# Patient Record
Sex: MALE | Race: WHITE | NOT HISPANIC OR LATINO | Employment: OTHER | URBAN - METROPOLITAN AREA
[De-identification: names, ages, dates, MRNs, and addresses within clinical notes are randomized per-mention and may not be internally consistent; named-entity substitution may affect disease eponyms.]

---

## 2018-12-07 ENCOUNTER — TRANSCRIBE ORDERS (OUTPATIENT)
Dept: ADMINISTRATIVE | Facility: HOSPITAL | Age: 73
End: 2018-12-07

## 2018-12-07 ENCOUNTER — APPOINTMENT (OUTPATIENT)
Dept: RADIOLOGY | Facility: CLINIC | Age: 73
End: 2018-12-07
Payer: MEDICARE

## 2018-12-07 DIAGNOSIS — R52 PAIN: Primary | ICD-10-CM

## 2018-12-07 PROCEDURE — 72040 X-RAY EXAM NECK SPINE 2-3 VW: CPT

## 2019-06-23 ENCOUNTER — APPOINTMENT (OUTPATIENT)
Dept: RADIOLOGY | Facility: CLINIC | Age: 74
End: 2019-06-23
Payer: MEDICARE

## 2019-06-23 ENCOUNTER — APPOINTMENT (EMERGENCY)
Dept: CT IMAGING | Facility: HOSPITAL | Age: 74
End: 2019-06-23
Payer: MEDICARE

## 2019-06-23 ENCOUNTER — APPOINTMENT (EMERGENCY)
Dept: RADIOLOGY | Facility: HOSPITAL | Age: 74
End: 2019-06-23
Payer: MEDICARE

## 2019-06-23 ENCOUNTER — HOSPITAL ENCOUNTER (EMERGENCY)
Facility: HOSPITAL | Age: 74
Discharge: HOME | End: 2019-06-23
Attending: EMERGENCY MEDICINE | Admitting: EMERGENCY MEDICINE
Payer: MEDICARE

## 2019-06-23 ENCOUNTER — OFFICE VISIT (OUTPATIENT)
Dept: URGENT CARE | Facility: CLINIC | Age: 74
End: 2019-06-23
Payer: MEDICARE

## 2019-06-23 VITALS
HEART RATE: 82 BPM | OXYGEN SATURATION: 94 % | WEIGHT: 202.16 LBS | SYSTOLIC BLOOD PRESSURE: 149 MMHG | RESPIRATION RATE: 16 BRPM | BODY MASS INDEX: 31.66 KG/M2 | DIASTOLIC BLOOD PRESSURE: 74 MMHG | TEMPERATURE: 97.7 F

## 2019-06-23 VITALS
HEIGHT: 67 IN | WEIGHT: 200 LBS | TEMPERATURE: 98 F | SYSTOLIC BLOOD PRESSURE: 133 MMHG | BODY MASS INDEX: 31.39 KG/M2 | DIASTOLIC BLOOD PRESSURE: 76 MMHG | HEART RATE: 84 BPM | RESPIRATION RATE: 16 BRPM | OXYGEN SATURATION: 97 %

## 2019-06-23 DIAGNOSIS — S42.91XA CLOSED FRACTURE DISLOCATION OF RIGHT SHOULDER, INITIAL ENCOUNTER: Primary | ICD-10-CM

## 2019-06-23 DIAGNOSIS — M21.821 HILL SACHS DEFORMITY, RIGHT: ICD-10-CM

## 2019-06-23 DIAGNOSIS — S43.491A: ICD-10-CM

## 2019-06-23 DIAGNOSIS — S43.014A ANTERIOR DISLOCATION OF RIGHT SHOULDER, INITIAL ENCOUNTER: Primary | ICD-10-CM

## 2019-06-23 DIAGNOSIS — M25.511 ACUTE PAIN OF RIGHT SHOULDER: ICD-10-CM

## 2019-06-23 PROCEDURE — 23650 CLTX SHO DSLC W/MNPJ WO ANES: CPT | Performed by: EMERGENCY MEDICINE

## 2019-06-23 PROCEDURE — 73200 CT UPPER EXTREMITY W/O DYE: CPT

## 2019-06-23 PROCEDURE — 99284 EMERGENCY DEPT VISIT MOD MDM: CPT

## 2019-06-23 PROCEDURE — 73020 X-RAY EXAM OF SHOULDER: CPT

## 2019-06-23 PROCEDURE — 99285 EMERGENCY DEPT VISIT HI MDM: CPT | Performed by: EMERGENCY MEDICINE

## 2019-06-23 PROCEDURE — 99213 OFFICE O/P EST LOW 20 MIN: CPT | Performed by: PHYSICIAN ASSISTANT

## 2019-06-23 PROCEDURE — 73030 X-RAY EXAM OF SHOULDER: CPT

## 2019-06-23 PROCEDURE — G0463 HOSPITAL OUTPT CLINIC VISIT: HCPCS | Performed by: PHYSICIAN ASSISTANT

## 2019-06-23 PROCEDURE — 96374 THER/PROPH/DIAG INJ IV PUSH: CPT

## 2019-06-23 PROCEDURE — 96375 TX/PRO/DX INJ NEW DRUG ADDON: CPT

## 2019-06-23 PROCEDURE — 96361 HYDRATE IV INFUSION ADD-ON: CPT

## 2019-06-23 RX ORDER — AMLODIPINE BESYLATE 5 MG/1
TABLET ORAL
Refills: 0 | COMMUNITY
Start: 2019-04-12 | End: 2021-05-28 | Stop reason: CLARIF

## 2019-06-23 RX ORDER — ASPIRIN 81 MG/1
81 TABLET ORAL EVERY 12 HOURS
COMMUNITY
End: 2021-02-22 | Stop reason: SDUPTHER

## 2019-06-23 RX ORDER — KETAMINE HCL IN NACL, ISO-OSM 100MG/10ML
1 SYRINGE (ML) INJECTION ONCE
Status: COMPLETED | OUTPATIENT
Start: 2019-06-23 | End: 2019-06-23

## 2019-06-23 RX ORDER — ONDANSETRON 2 MG/ML
4 INJECTION INTRAMUSCULAR; INTRAVENOUS ONCE
Status: COMPLETED | OUTPATIENT
Start: 2019-06-23 | End: 2019-06-23

## 2019-06-23 RX ORDER — HYDROMORPHONE HCL/PF 1 MG/ML
1 SYRINGE (ML) INJECTION ONCE
Status: COMPLETED | OUTPATIENT
Start: 2019-06-23 | End: 2019-06-23

## 2019-06-23 RX ORDER — CLOBETASOL PROPIONATE 0.5 MG/G
CREAM TOPICAL
Refills: 2 | COMMUNITY
Start: 2019-06-09 | End: 2021-06-21 | Stop reason: ALTCHOICE

## 2019-06-23 RX ORDER — KETAMINE HCL IN NACL, ISO-OSM 100MG/10ML
SYRINGE (ML) INJECTION
Status: COMPLETED
Start: 2019-06-23 | End: 2019-06-23

## 2019-06-23 RX ORDER — OXYCODONE HYDROCHLORIDE 5 MG/1
2.5 TABLET ORAL EVERY 6 HOURS PRN
Qty: 10 TABLET | Refills: 0 | Status: SHIPPED | OUTPATIENT
Start: 2019-06-23 | End: 2019-06-28

## 2019-06-23 RX ADMIN — Medication 20 MG: at 16:57

## 2019-06-23 RX ADMIN — Medication 50 MG: at 16:49

## 2019-06-23 RX ADMIN — SODIUM CHLORIDE 1000 ML: 0.9 INJECTION, SOLUTION INTRAVENOUS at 16:43

## 2019-06-23 RX ADMIN — ONDANSETRON 4 MG: 2 INJECTION INTRAMUSCULAR; INTRAVENOUS at 16:43

## 2019-06-23 RX ADMIN — HYDROMORPHONE HYDROCHLORIDE 1 MG: 1 INJECTION, SOLUTION INTRAMUSCULAR; INTRAVENOUS; SUBCUTANEOUS at 15:51

## 2019-06-23 NOTE — SEDATION DOCUMENTATION
Results of ct scan discussed with patient and wife by dr Faheem Florence   Pt given water to sip on by Dr Sheridan Bermudez

## 2019-06-23 NOTE — ED PROVIDER NOTES
History  Chief Complaint   Patient presents with    Shoulder Injury     dislocation of right shoulder, seen at urgent care in [place] , sustained some superficial abrasions of forearm, was mowing grass and lost control struck a tree  Arthur Cifuentes \[facility] [place] [facility]\""     15-year-old male with history of thyroglossal duct cyst, asthma, hypertension, who presents for evaluation of right anterior shoulder dislocation  Patient was using a push mower to mow his lawn when he accidentally ran into a tree with his right shoulder  He did not fall to the ground, hit his head, or lose consciousness  Patient's neighbor is a PA, and attempted to reduce the patient's shoulder at home, but was unsuccessful  The patient went to an urgent care where x-rays were obtained that showed a right anterior shoulder dislocation  He denies numbness or tingling in his hand  No prior history of injury to the shoulder  Endorses pain just in the right shoulder  No headache, neck pain, back pain  Prior to Admission Medications   Prescriptions Last Dose Informant Patient Reported? Taking? amLODIPine (NORVASC) 5 mg tablet   Yes Yes   aspirin (ECOTRIN LOW STRENGTH) 81 mg EC tablet  Self Yes Yes   Sig: Take 81 mg by mouth every 12 (twelve) hours   clobetasol (TEMOVATE) 0 05 % cream   Yes Yes   Sig: APPLY UP TO TWICE DAILY TO HANDS FOR 2 WEEKS ON THEN 1 WEEK OFF AS NEEDED  Facility-Administered Medications: None       Past Medical History:   Diagnosis Date    Asthma     Hypertension        Past Surgical History:   Procedure Laterality Date    CYST REMOVAL      TONSILLECTOMY         History reviewed  No pertinent family history  I have reviewed and agree with the history as documented  Social History     Tobacco Use    Smoking status: Never Smoker    Smokeless tobacco: Never Used   Substance Use Topics    Alcohol use: Yes     Comment: social    Drug use: Not on file        Review of Systems   Constitutional: Negative for chills and fever  HENT: Negative for congestion  Eyes: Negative for visual disturbance  Respiratory: Negative for cough and shortness of breath  Cardiovascular: Negative for chest pain and leg swelling  Gastrointestinal: Negative for abdominal pain, diarrhea, nausea and vomiting  Genitourinary: Negative for dysuria and frequency  Musculoskeletal: Positive for arthralgias (R shoulder pain)  Negative for back pain, joint swelling, neck pain and neck stiffness  Skin: Negative for rash  Neurological: Negative for weakness, numbness and headaches  Psychiatric/Behavioral: Negative for agitation, behavioral problems and confusion  Physical Exam  Physical Exam   Constitutional: He is oriented to person, place, and time  He appears well-developed and well-nourished  No distress  HENT:   Head: Normocephalic and atraumatic  Right Ear: External ear normal    Left Ear: External ear normal    Nose: Nose normal    Mouth/Throat: Oropharynx is clear and moist    Eyes: Conjunctivae are normal    Neck: Normal range of motion  Neck supple  Cardiovascular: Normal rate, regular rhythm, normal heart sounds and intact distal pulses  Exam reveals no gallop and no friction rub  No murmur heard  Pulmonary/Chest: Effort normal and breath sounds normal  No respiratory distress  He has no wheezes  He has no rales  Abdominal: Soft  Bowel sounds are normal  He exhibits no distension  There is no tenderness  There is no guarding  Musculoskeletal: Normal range of motion  He exhibits no edema or deformity  Obvious anterior shoulder dislocation  Palpable displaced bone fragment anterior to the right humeral head with associated tenderness to palpation  Neurological: He is alert and oriented to person, place, and time  He exhibits normal muscle tone  Flexion and extension is fully intact at the DIPs, PIPs, MCPs, and IP joints of the R hand  Flexion, extension, and lateral movements of the R wrist are intact   Opposition of the R thumb is intact  Pt is able to resist adduction and abduction of the fingers of the R hand  Intact sensation to light touch in the peripheral nerve distributions of the right hand  Intact sensation to light touch in the distribution of the right axillary nerve  Skin: Skin is warm and dry  He is not diaphoretic  Vital Signs  ED Triage Vitals   Temperature Pulse Respirations Blood Pressure SpO2   06/23/19 1509 06/23/19 1509 06/23/19 1509 06/23/19 1509 06/23/19 1509   97 7 °F (36 5 °C) 80 20 142/79 92 %      Temp Source Heart Rate Source Patient Position - Orthostatic VS BP Location FiO2 (%)   06/23/19 1509 06/23/19 2030 06/23/19 2030 06/23/19 1509 --   Oral Monitor Standing Left arm       Pain Score       06/23/19 1509       3           Vitals:    06/23/19 1853 06/23/19 1857 06/23/19 1905 06/23/19 2030   BP:  162/77  149/74   Pulse: 60 60 64 82   Patient Position - Orthostatic VS:    Standing         Visual Acuity      ED Medications  Medications   HYDROmorphone (DILAUDID) injection 1 mg (1 mg Intravenous Given 6/23/19 1551)   Ketamine HCl 92 mg (50 mg Intravenous Given 6/23/19 1649)   ondansetron (ZOFRAN) injection 4 mg (4 mg Intravenous Given 6/23/19 1643)   sodium chloride 0 9 % bolus 1,000 mL (0 mL Intravenous Stopped 6/23/19 2038)   Ketamine HCl 50 mg/5 mL **ADS Override Pull** (20 mg  Given 6/23/19 1657)       Diagnostic Studies  Results Reviewed     None                 CT shoulder right wo contrast   Final Result by La Nena Rodriguez MD (06/23 1817)      Hill-Sachs and bony Bankart lesions  Tiny loose body in the superior joint space  Small joint effusion  Workstation performed: [id number]         XR shoulder 1 vw right   Final Result by Milton Dahl MD (06/23 2114)      Single image suggests adequate reduction of the dislocation of the shoulder seen earlier  Hill-Sachs deformity noted        Probable joint effusion            Workstation performed: [id number]         XR shoulder 1 vw right   Final Result by Rozetta Nissen, MD (06/23 2058)      Persistent anteroinferior shoulder dislocation with Hill-Sachs fracture            Workstation performed: [id number]         XR shoulder 1 vw right   Final Result by Rozetta Nissen, MD (06/23 2057)      Persistent anteroinferiorly dislocated shoulder  Hill-Sachs fracture fragment less apparent due to projection            Workstation performed: [id number]                    Procedures  Orthopedic Injury  Date/Time: 6/23/2019 4:00 PM  Performed by: Jodee Markham MD  Authorized by: Jodee Markham MD     Patient Location:  ED  Other Assisting Provider: Yes (comment) (Dr Nelli Workman)    Verbal consent obtained?: Yes    Risks and benefits: Risks, benefits and alternatives were discussed    Consent given by:  Patient  Patient states understanding of procedure being performed: Yes    Patient identity confirmed:  Verbally with patient  Time out: Immediately prior to the procedure a time out was called    Injury location:  Shoulder  Location details:  Right shoulder  Injury type:  Fracture-dislocation  Dislocation type: anterior    Fracture type: greater humeral tuberosity    Neurovascular status: Neurovascularly intact    Distal perfusion: normal    Neurological function: normal    Range of motion: reduced    Local anesthesia used?: No    Sedation type:   Moderate (conscious) sedation (See separate Procedural Sedation form)  Manipulation performed?: Yes    Skin traction used?: No    Skeletal traction used?: Yes    Reduction successful?: Yes    Confirmation: Reduction confirmed by x-ray    Immobilization:  Sling  Neurovascular status: Neurovascularly intact    Distal perfusion: normal    Neurological function: normal    Range of motion: normal    Patient tolerance:  Patient tolerated the procedure well with no immediate complications  Procedural Sedation  Date/Time: 6/24/2019 8:12 AM  Performed by: Jodee Markham MD  Authorized by: Andreina Whyte MD     Consent:     Consent obtained:  Written    Consent given by:  Patient    Risks discussed: Allergic reaction, inadequate sedation, nausea, respiratory compromise necessitating ventilatory assistance and intubation and vomiting    Alternatives discussed:  Analgesia without sedation  Universal protocol:     Procedure explained and questions answered to patient or proxy's satisfaction: yes      Relevant documents present and verified: yes      Radiology Images displayed and confirmed    If images not available, report reviewed: yes      Required blood products, implants, devices, and special equipment available: yes      Immediately prior to procedure a time out was called: yes      Patient identity confirmation method:  Verbally with patient  Indications:     Sedation purpose:  Dislocation reduction    Procedure necessitating sedation performed by:  Physician performing sedation    Intended level of sedation:  Moderate (conscious sedation)  Pre-sedation assessment:     ASA classification: class 1 - normal, healthy patient      Neck mobility: normal      Mouth opening:  3 or more finger widths    Thyromental distance:  2 finger widths    Mallampati score:  II - soft palate, uvula, fauces visible    Pre-sedation assessments completed and reviewed: airway patency, cardiovascular function, hydration status, mental status, nausea/vomiting and pain level    Immediate pre-procedure details:     Reassessment: Patient reassessed immediately prior to procedure      Reviewed: vital signs      Verified: bag valve mask available, emergency equipment available, intubation equipment available, IV patency confirmed and oxygen available    Procedure details (see MAR for exact dosages):     Preoxygenation:  Nasal cannula    Sedation:  Ketamine    Intra-procedure monitoring:  Blood pressure monitoring, cardiac monitor, continuous capnometry and continuous pulse oximetry    Intra-procedure events: none Intra-procedure management:  Airway repositioning and fluid bolus  Post-procedure details:     Attendance: Constant attendance by certified staff until patient recovered      Recovery: Patient returned to pre-procedure baseline      Post-sedation assessments completed and reviewed: airway patency, cardiovascular function, hydration status, mental status, nausea/vomiting and pain level      Patient is stable for discharge or admission: yes      Patient tolerance: Tolerated well, no immediate complications      Conscious Sedation Assessment      Classification Score   ASA Scale Assessment  1-Healthy patient, no disease outside surgical process filed at 06/23/2019 1642           ED Course                               MDM  Number of Diagnoses or Management Options  Diagnosis management comments: Generally well appearing  Afebrile and hemodynamically stable  Patient has an obvious right anterior shoulder dislocation, with a displaced fracture off an unknown location to the humeral head  He is neurovascularly intact as above to the right upper extremity  Given accompanying displaced fracture, case was discussed with Dr Venecia Parker with Orthopedic surgery  After reviewing the patient's x-ray, recommends shoulder reduction followed by CT scan to get better characterization of the location of the displaced bone fragment  If the displaced bone fragment is intra-articular following the reduction, patient will require admission for urgent surgery tomorrow  If the bone fragment is extra-articular following the reduction, patient can be discharged home in a sling with Orthopedic surgery follow-up  Numerous maneuvers were attempted without procedural sedation status post a dose of IV Dilaudid to reduce the patient's shoulder, unfortunately were unsuccessful  Ketamine procedural sedation given with successful reduction of the patient's shoulder      CT scan reveals Hill-Sachs and bony Bankart lesions with tiny loose body in the superior joint space and a small accompanying joint effusion  Per Orthopedic surgery, patient is stable for discharge home with follow-up with Dr Kosta Dumont this week for humeral head fracture with possible accompanying rotator cuff injury  Will prescribe a small amount of oxycodone at discharge  Return precautions discussed  Disposition  Final diagnoses:   Anterior dislocation of right shoulder, initial encounter   Hill Sachs deformity, right   Bankart variant lesion of right shoulder, initial encounter     Time reflects when diagnosis was documented in both MDM as applicable and the Disposition within this note     Time User Action Codes Description Comment    6/23/2019  6:51 PM Thais Bagley [[id number]] Anterior dislocation of right shoulder, initial encounter     6/23/2019  6:51 PM Thais Bagley Elease Valdemar deformity, right     6/23/2019  6:52 PM Thais Bagley [[id number]] Bankart variant lesion of right shoulder, initial encounter       ED Disposition     ED Disposition Condition Date/Time Comment    Discharge Stable [place] Jun 23, 2019  6:51 PM Aggie Jacobs  discharge to home/self care  Follow-up Information     Follow up With Specialties Details Why Contact Info Additional Information    Shellie Paul MD Orthopedic Surgery  Please call to set up an appointment within 1 week for further management of your shoulder fracture  [address] [facility]  [address] [facility] [address] [facility] Emergency Department Emergency Medicine  Return to the Emergency Department for numbness or tingling in your right hand, weakness in your right hand, or new or concerning symptoms   [address] [facility] [address] [phone] [facility] ED,  [address], [place], [place], [address]          Discharge Medication List as of 6/23/2019  6:55 PM      START taking these medications    Details   oxyCODONE (ROXICODONE) 5 mg immediate release tablet Take 0 5 tablets (2 5 mg total) by mouth every 6 (six) hours as needed for moderate pain (shoulder pain) for up to 5 daysMax Daily Amount: 10 mg, Starting Sun 6/23/2019, Until Fri 6/28/2019, Print         CONTINUE these medications which have NOT CHANGED    Details   amLODIPine (NORVASC) 5 mg tablet Starting Fri 4/12/2019, Historical Med      aspirin (ECOTRIN LOW STRENGTH) 81 mg EC tablet Take 81 mg by mouth every 12 (twelve) hours, Historical Med      clobetasol (TEMOVATE) 0 05 % cream APPLY UP TO TWICE DAILY TO HANDS FOR 2 WEEKS ON THEN 1 WEEK OFF AS NEEDED , Historical Med           No discharge procedures on file      ED Provider  Electronically Signed by           Rod Wagner MD  06/23/19 0568       Rod Wagner MD  06/24/19 7802

## 2019-06-24 NOTE — ED NOTES
Pt ambulated around department, steady gait noted  Pt states that he feels good and is requesting to go home, Dr Yesenia De Leon made aware       Almaz Garcia, TAMIA  06/23/19 2039

## 2019-06-26 VITALS
SYSTOLIC BLOOD PRESSURE: 134 MMHG | WEIGHT: 198 LBS | HEIGHT: 67 IN | HEART RATE: 86 BPM | BODY MASS INDEX: 31.08 KG/M2 | DIASTOLIC BLOOD PRESSURE: 86 MMHG

## 2019-06-26 DIAGNOSIS — S42.141A CLOSED FRACTURE OF RIM OF GLENOID FOSSA OF RIGHT SCAPULA, INITIAL ENCOUNTER: ICD-10-CM

## 2019-06-26 DIAGNOSIS — S42.254A CLOSED NONDISPLACED FRACTURE OF GREATER TUBEROSITY OF RIGHT HUMERUS, INITIAL ENCOUNTER: Primary | ICD-10-CM

## 2019-06-26 PROCEDURE — 23620 CLTX GR HMRL TBRS FX WO MNPJ: CPT | Performed by: ORTHOPAEDIC SURGERY

## 2019-06-26 PROCEDURE — 99203 OFFICE O/P NEW LOW 30 MIN: CPT | Performed by: ORTHOPAEDIC SURGERY

## 2019-07-10 ENCOUNTER — APPOINTMENT (OUTPATIENT)
Dept: RADIOLOGY | Facility: AMBULARY SURGERY CENTER | Age: 74
End: 2019-07-10
Attending: ORTHOPAEDIC SURGERY
Payer: MEDICARE

## 2019-07-10 VITALS
DIASTOLIC BLOOD PRESSURE: 80 MMHG | HEIGHT: 67 IN | WEIGHT: 198 LBS | BODY MASS INDEX: 31.08 KG/M2 | SYSTOLIC BLOOD PRESSURE: 141 MMHG | HEART RATE: 92 BPM

## 2019-07-10 DIAGNOSIS — M25.511 ACUTE PAIN OF RIGHT SHOULDER: ICD-10-CM

## 2019-07-10 DIAGNOSIS — S42.254D CLOSED NONDISPLACED FRACTURE OF GREATER TUBEROSITY OF RIGHT HUMERUS WITH ROUTINE HEALING, SUBSEQUENT ENCOUNTER: Primary | ICD-10-CM

## 2019-07-10 DIAGNOSIS — S42.141D CLOSED FRACTURE OF RIM OF GLENOID FOSSA OF RIGHT SCAPULA WITH ROUTINE HEALING, SUBSEQUENT ENCOUNTER: ICD-10-CM

## 2019-07-10 PROCEDURE — 99024 POSTOP FOLLOW-UP VISIT: CPT | Performed by: ORTHOPAEDIC SURGERY

## 2019-07-10 PROCEDURE — 73030 X-RAY EXAM OF SHOULDER: CPT

## 2019-07-10 NOTE — PROGRESS NOTES
Patient Name:  Manas Omer  MRN:  [id number]    Assessment & Plan     Right greater tuberosity fracture and small anteroinferior glenoid rim fracture (bony Bankart) secondary to right shoulder dislocation sustained on 06/23/2019    1  Patient may begin physical therapy for passive range of motion  2  Patient not lift weight greater than 1 lb, and he may perform activities tolerated  3  Follow-up 4 weeks for new x-rays      Subjective     Patient is a 67-year-old male who presents the office today for a follow-up in regard to his right shoulder fracture sustained on 06/23/2019  Patient has discontinued use of sling at this time  He has continued to work on his range of motion at home  Today he denies any significant pain  He has been able to manage his pain with over-the-counter medication  He denies any numbness or tingling  He denies any new injury  General ROS:  Negative for fever or chills  Neurological ROS:  Negative for numbness or tingling  Objective     /80   Pulse 92   Ht 5' 7" (1 702 m)   Wt 89 8 kg (198 lb)   BMI 31 01 kg/m²     Right shoulder:  Skin intact  Ecchymosis noted  Full range of motion of the elbow appreciated  Full composite fist noted  Range of motion as expected  strength a shoulder deferred  Sensation intact  Radial pulse 2 +    Data Review     I have personally reviewed pertinent films in PACS, and my interpretation follows        X-rays of right shoulder obtained on 07/10/2019 demonstrate stable alignment of fracture to the glenoid rim and greater tuberosity    Social History     Tobacco Use    Smoking status: Never Smoker    Smokeless tobacco: Never Used   Substance Use Topics    Alcohol use: Yes     Comment: social    Drug use: Not on file       Scribe Attestation    I,:   Nj Sanchez MA am acting as a scribe while in the presence of the attending physician :        I,:   Pablito Cervantes MD personally performed the services described in this documentation    as scribed in my presence :

## 2019-07-16 ENCOUNTER — EVALUATION (OUTPATIENT)
Dept: PHYSICAL THERAPY | Facility: CLINIC | Age: 74
End: 2019-07-16
Payer: MEDICARE

## 2019-07-16 DIAGNOSIS — S42.254A CLOSED NONDISPLACED FRACTURE OF GREATER TUBEROSITY OF RIGHT HUMERUS, INITIAL ENCOUNTER: ICD-10-CM

## 2019-07-16 DIAGNOSIS — S42.141A CLOSED FRACTURE OF RIM OF GLENOID FOSSA OF RIGHT SCAPULA, INITIAL ENCOUNTER: ICD-10-CM

## 2019-07-16 PROCEDURE — 97162 PT EVAL MOD COMPLEX 30 MIN: CPT | Performed by: PHYSICAL THERAPIST

## 2019-07-16 PROCEDURE — 97110 THERAPEUTIC EXERCISES: CPT | Performed by: PHYSICAL THERAPIST

## 2019-07-16 PROCEDURE — 97140 MANUAL THERAPY 1/> REGIONS: CPT | Performed by: PHYSICAL THERAPIST

## 2019-07-16 NOTE — PROGRESS NOTES
PT Evaluation     Today's date: 2019  Patient name: Derek Medrano  : 1945  MRN: [id number]  Referring provider: Gallito Monet MD  Dx:   Encounter Diagnosis     ICD-10-CM    1  Closed nondisplaced fracture of greater tuberosity of right humerus, initial encounter S42 254A Ambulatory referral to Physical Therapy   2  Closed fracture of rim of glenoid fossa of right scapula, initial encounter S42 141A Ambulatory referral to Physical Therapy                  Assessment  Assessment details: Derek Medrano  is a pleasant 66 y o  presenting to physical therapy with MD referral for Closed nondisplaced fracture of greater tuberosity of right humerus, initial encounter, and Closed fracture of rim of glenoid fossa of right scapula, initial encounter  Problem list: Poor posture, decreased shoulder PROM, AROM not assessed and strength not assessed due to script stating PROM only  Treatment to include: Manual therapy techniques, shoulder PROM (A/AAROM when permitted by MD), RTC/periscapular strengthening when permitted by MD, instruction in a comprehensive HEP, and modalities as needed  This pt would benefit from skilled PT services to address their impairments and functional limitations to maximize functional outcome  Symptom irritability: lowBarriers to therapy: age  Understanding of Dx/Px/POC: good   Prognosis: good    Goals  ST  Pt will improve shoulder flexion PROM to at least 155 degrees in 4 weeks  2  Pt will improve shoulder scaption PROM to at least 155 degrees in 4 weeks  LT  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 with minimal to no pain in  8 weeks  2  Pt will be independent in a comprehensive HEP in 8 weeks      Plan  Patient would benefit from: skilled physical therapy  Frequency: 2x week  Duration in weeks: 8  Treatment plan discussed with: patient        Subjective Evaluation    History of Present Illness  Date of onset: 2019  Mechanism of injury: trauma  Mechanism of injury: Pt reports he was using a push mower over his septic mound and the mower caused him to loose his balance and fall into a tree backwards  Pt states he was unable to move his right arm and was in extreme pain  Pt states his neighbor helped pt get stable and his wife drove him to Urgent Care where x-rays were taken revealing anterior shoulder dislocation  Pt was taken to the hospital via his wife where he was taken back into a room where they reset his shoulder  Pt states he followed-up with ortho who educated pt to start PROM only  Pt denies any numbness or tingling in his arm or any cold sensation in arm  Premorbid status:  - ADLs: Independent with no difficulty  - Work: Not a working individual- retired  - Recreation: walking for exercise    Current status:  - ADLs/Functional activities:     - Reaching into shoulder height cabinets with Pain Levels: unable   - Reaching into overhead cabinets with Pain Levels: unable   - Washing lower back/tucking in shirt with Pain Levels: unable   - Washing hair with Pain Levels: unable   - Driving with both arms with no pain   - Lifting unable with RUE   - Carrying unable with [address] with 0 nightly sleep disturbances due to pain  - Work: Not a working individual- retired  - Recreation: none  Pain  Current pain ratin  At best pain ratin  At worst pain ratin  Location: Lateral aspect of shoulder  Quality: discomfort  Relieving factors: medications and ice  Aggravating factors: overhead activity  Progression: improved      Diagnostic Tests  X-ray: abnormal  Treatments  Treatments tried: reset shoulder in ER    Current treatment: medication and physical therapy  Patient Goals  Patient goals for therapy: increased motion, decreased pain, increased strength and independence with ADLs/IADLs          Objective     Postural Observations  Seated posture: fair  Standing posture: fair    Additional Postural Observation Details  Moderate forward head and forward shoulder posture    Observations     Additional Observation Details  Yellow and purple bruising noted over proximal forearm and around upper arm on RUE    Palpation     Additional Palpation Details  Increased TTP over corocoid process on RUE     Active Range of Motion   Left Shoulder   Flexion: 155 degrees   Abduction: 145 degrees   External rotation BTH: T5   Internal rotation BTB: T7     Additional Active Range of Motion Details  AROM not assessed on RUE due to script stating PROM only      Passive Range of Motion     Right Shoulder   Flexion: 140 degrees with pain  Abduction: 140 degrees with pain  External rotation 45°: 55 degrees with pain  Internal rotation 45°: 43 degrees     Strength/Myotome Testing     Left Shoulder     Planes of Motion   Flexion: 5   Abduction: 5   External rotation at 0°: 5     Additional Strength Details  Strength not tested on RUE due to PROM only      Flowsheet Rows      Most Recent Value   PT/OT G-Codes   Current Score  51   Projected Score  67             Precautions: PROM only per MD script, No combined ext and IR      Manual  7-16 (IE)            PROM with OP into shoulder flex, scap, IR/ER at 45 degrees abd JG                                                                    Exercise Diary  7-16 (IE)            Pendulums CW/CCW/AP/ML 20 x ea NV                         Seated:             - PROM flexion 10 x 10" NV            - PROM scaption 10 x 10" NV            - scapular retraction 10 x 10" NV            - cervical retraction 10 x 10" NV            - elbow flexion 2 x 10 NV            - wrist flex/ext 2 x 10 NV            - pron/sup 2 x 10 NV                                                                Standing:             - ER stretch in doorway 4 x 30" NV                                                                                              Modalities  7-16 (IE)                                                     * On initial evaluation, educated pt on anatomy, pathology, and exercise rationale  Provided pt with basic HEP and ensured proper exercise performance  Educated pt to call with any questions or concerns  Educated pt to avoid combined extension and IR due to bony bankart lesion  Access Code: [email or web address]   URL: [email or web address]   Date: 07/16/2019   Prepared by: Orvis Dye      Exercises  · Circular Shoulder Pendulum with Table Support - 20 reps - 3x daily  · Seated Shoulder Scaption Slide at Table Top with Forearm in Neutral - 10 reps - 5 seconds hold - 3x daily  · Flexion-Extension Shoulder Pendulum with Table Support - 20 reps - 3x daily  · Seated Shoulder Flexion Towel Slide at Table Top - 10 reps - 5 seconds hold - 3x daily  · Seated Scapular Retraction - 10 reps - 10 seconds hold - 3x daily  · Seated Cervical Retraction - 10 reps - 10 seconds hold - 3x daily

## 2019-07-18 ENCOUNTER — OFFICE VISIT (OUTPATIENT)
Dept: PHYSICAL THERAPY | Facility: CLINIC | Age: 74
End: 2019-07-18
Payer: MEDICARE

## 2019-07-18 DIAGNOSIS — S42.254A CLOSED NONDISPLACED FRACTURE OF GREATER TUBEROSITY OF RIGHT HUMERUS, INITIAL ENCOUNTER: Primary | ICD-10-CM

## 2019-07-18 DIAGNOSIS — S42.141A CLOSED FRACTURE OF RIM OF GLENOID FOSSA OF RIGHT SCAPULA, INITIAL ENCOUNTER: ICD-10-CM

## 2019-07-18 PROCEDURE — 97140 MANUAL THERAPY 1/> REGIONS: CPT

## 2019-07-18 PROCEDURE — 97110 THERAPEUTIC EXERCISES: CPT

## 2019-07-18 PROCEDURE — 97112 NEUROMUSCULAR REEDUCATION: CPT

## 2019-07-18 NOTE — PROGRESS NOTES
Daily Note     Today's date: 2019  Patient name: July Artis  : 1945  MRN: [id number]  Referring provider: Rashad Preciado MD  Dx:   Encounter Diagnosis     ICD-10-CM    1  Closed nondisplaced fracture of greater tuberosity of right humerus, initial encounter S42 254A    2  Closed fracture of rim of glenoid fossa of right scapula, initial encounter S42 141A        Start Time: 1515  Stop Time: 1555  Total time in clinic (min): 40 minutes    Subjective: Pt reports R shldr feeling sore prior to start of session  Thinks he it may be from some of the stretches performed from his HEP that he may have overdone  States pain this morning was a 2/10 but is now a 1/10 before starting treatment today  Objective: See treatment diary below      Assessment: Tolerated treatment well  Program initiated today as prescribed by evaluating PT  Was able to perform all exercise with no c/o pain  Slight pain noted at end range during PROM in abd, flex, and ER  Required occasional verbal/tactile cueing for proper form and intensity with new exercises performed today  Patient would benefit from continued PT to further improve ROM with decreased pain in effort to return to PLOF  Plan: Continue per plan of care        Precautions: PROM only per MD script, No combined ext and IR      Manual  - (IE)            PROM with OP into shoulder flex, scap, IR/ER at 45 degrees abd [employer]                                                                   Exercise Diary  - (IE)            Pendulums CW/CCW/AP/ML 20 x ea NV 20 x ea                        Seated:             - PROM flexion 10 x 10" NV 10 x 10"           - PROM scaption 10 x 10" NV 10 x 10"           - scapular retraction 10 x 10" NV 10 x 10"           - cervical retraction 10 x 10" NV 10 x 10"           - elbow flexion 2 x 10 NV 2x10           - wrist flex/ext 2 x 10 NV 2x10           - pron/sup 2 x 10 NV 2x10 Standing:             - ER stretch in doorway 4 x 30" NV 4 x 30"                                                                                             Modalities  7-16 (IE) 7/18

## 2019-07-23 ENCOUNTER — OFFICE VISIT (OUTPATIENT)
Dept: PHYSICAL THERAPY | Facility: CLINIC | Age: 74
End: 2019-07-23
Payer: MEDICARE

## 2019-07-23 DIAGNOSIS — S42.141A CLOSED FRACTURE OF RIM OF GLENOID FOSSA OF RIGHT SCAPULA, INITIAL ENCOUNTER: ICD-10-CM

## 2019-07-23 DIAGNOSIS — S42.254A CLOSED NONDISPLACED FRACTURE OF GREATER TUBEROSITY OF RIGHT HUMERUS, INITIAL ENCOUNTER: Primary | ICD-10-CM

## 2019-07-23 PROCEDURE — 97110 THERAPEUTIC EXERCISES: CPT

## 2019-07-23 PROCEDURE — 97140 MANUAL THERAPY 1/> REGIONS: CPT

## 2019-07-23 NOTE — PROGRESS NOTES
Daily Note     Today's date: 2019  Patient name: Shelbi Carmona  : 1945  MRN: [id number]  Referring provider: Leopold Mura, MD  Dx:   Encounter Diagnosis     ICD-10-CM    1  Closed nondisplaced fracture of greater tuberosity of right humerus, initial encounter S42 254A    2  Closed fracture of rim of glenoid fossa of right scapula, initial encounter S42 141A        Start Time: 1441  Stop Time: 1519  Total time in clinic (min): 38 minutes    Subjective: Pt reported his R shoulder/ humerus pain makes it hard for him to sleep -2/10  Pt declines icing RUE at home  Pt reported he goes back to see his MD on Aug 7th  Objective: See treatment diary below      Assessment: Continued with treatment plan no increase in pain with exercises performed  Tolerated treatment well  Patient demonstrated fatigue post treatment, exhibited good technique with therapeutic exercises and would benefit from continued PT  Pt reported he will ice shoulder at home if the pain is increased  Plan: Continue per plan of care  Precautions: PROM only per MD script, No combined ext and IR      Manual   (IE)           PROM with OP into shoulder flex, scap, IR/ER at 45 degrees abd [employer] SC                                                                  Exercise Diary   (IE)           Pendulums CW/CCW/AP/ML 20 x ea NV 20 x ea 20x ea                          Seated:             - PROM flexion 10 x 10" NV 10 x 10"           - PROM scaption 10 x 10" NV 10 x 10"           - scapular retraction 10 x 10" NV 10 x 10" 10" x 10           - cervical retraction 10 x 10" NV 10 x 10" 10x 10"           - elbow flexion 2 x 10 NV 2x10 2x10           - wrist flex/ext 2 x 10 NV 2x10 2x 10           - pron/sup 2 x 10 NV 2x10 2x10                                                               Standing:             - ER stretch in doorway 4 x 30" NV 4 x 30" Modalities  7-16 (IE) 7/18

## 2019-07-25 ENCOUNTER — OFFICE VISIT (OUTPATIENT)
Dept: PHYSICAL THERAPY | Facility: CLINIC | Age: 74
End: 2019-07-25
Payer: MEDICARE

## 2019-07-25 DIAGNOSIS — S42.141A CLOSED FRACTURE OF RIM OF GLENOID FOSSA OF RIGHT SCAPULA, INITIAL ENCOUNTER: ICD-10-CM

## 2019-07-25 DIAGNOSIS — S42.254A CLOSED NONDISPLACED FRACTURE OF GREATER TUBEROSITY OF RIGHT HUMERUS, INITIAL ENCOUNTER: Primary | ICD-10-CM

## 2019-07-25 PROCEDURE — 97110 THERAPEUTIC EXERCISES: CPT

## 2019-07-25 PROCEDURE — 97140 MANUAL THERAPY 1/> REGIONS: CPT

## 2019-07-25 NOTE — PROGRESS NOTES
Daily Note     Today's date: 2019  Patient name: Angel Pennington  : 1945  MRN: [id number]  Referring provider: Brian Santacruz MD  Dx:   Encounter Diagnosis     ICD-10-CM    1  Closed nondisplaced fracture of greater tuberosity of right humerus, initial encounter S42 254A    2  Closed fracture of rim of glenoid fossa of right scapula, initial encounter S42 141A        Start Time: 1400  Stop Time: 1439  Total time in clinic (min): 39 minutes    Subjective: pt reported no change in pain, 2/10 pain all the time consistently  Pt described it as muscle pain  Objective: See treatment diary below      Assessment:  Added some AAROM exercises, table slides and stretches, Tolerated treatment well  Patient demonstrated fatigue post treatment, exhibited good technique with therapeutic exercises and would benefit from continued PT, Pt denied increase in pain with other exercises  Plan: Continue per plan of care  Precautions: PROM only per MD script, No combined ext and IR      Manual   (IE)          PROM with OP into shoulder flex, scap, IR/ER at 45 degrees abd [employer] SC                                                                  Exercise Diary   (IE)          Pendulums CW/CCW/AP/ML 20 x ea NV 20 x ea 20x ea  20x ea                         Seated:             - PROM flexion 10 x 10" NV 10 x 10"  np         - PROM scaption 10 x 10" NV 10 x 10"  np         - scapular retraction 10 x 10" NV 10 x 10" 10" x 10  10" x 10          - cervical retraction 10 x 10" NV 10 x 10" 10x 10"  10x 10"          - elbow flexion 2 x 10 NV 2x10 2x10  2x10          - wrist flex/ext 2 x 10 NV 2x10 2x 10  3x10          - pron/sup 2 x 10 NV 2x10 2x10  2x10                       Table slides flexion    10" x 10          Table slides scaption    10" x 10          Seated ER stretch    30" x 4                                                 Standing:             - ER stretch in doorway 4 x 30" NV 4 x 30"                                                                                             Modalities  7-16 (IE) 7/18 7/25

## 2019-07-30 ENCOUNTER — OFFICE VISIT (OUTPATIENT)
Dept: PHYSICAL THERAPY | Facility: CLINIC | Age: 74
End: 2019-07-30
Payer: MEDICARE

## 2019-07-30 DIAGNOSIS — S42.254A CLOSED NONDISPLACED FRACTURE OF GREATER TUBEROSITY OF RIGHT HUMERUS, INITIAL ENCOUNTER: Primary | ICD-10-CM

## 2019-07-30 DIAGNOSIS — S42.141A CLOSED FRACTURE OF RIM OF GLENOID FOSSA OF RIGHT SCAPULA, INITIAL ENCOUNTER: ICD-10-CM

## 2019-07-30 PROCEDURE — 97110 THERAPEUTIC EXERCISES: CPT

## 2019-07-30 PROCEDURE — 97140 MANUAL THERAPY 1/> REGIONS: CPT

## 2019-07-30 NOTE — PROGRESS NOTES
Daily Note     Today's date: 2019  Patient name: Hillary Lemons  : 1945  MRN: [id number]  Referring provider: Malu Rogers MD  Dx:   Encounter Diagnosis     ICD-10-CM    1  Closed nondisplaced fracture of greater tuberosity of right humerus, initial encounter S42 254A    2  Closed fracture of rim of glenoid fossa of right scapula, initial encounter S42 141A        Start Time: 1205  Stop Time: 1247  Total time in clinic (min): 42 minutes    Subjective: Pt reported pain is getting better 1/10 prior to treatment session, Pt reported sleeping is getting better as well  Objective: See treatment diary below      Assessment:   Continued with treatment plan, progressed number of reps today, no additional pain t/o treatment session, Tolerated treatment well  Patient demonstrated fatigue post treatment, exhibited good technique with therapeutic exercises and would benefit from continued PT, pt progressing well, progress via patient MD script  Plan: Continue per plan of care  Precautions: PROM only per MD script, No combined ext and IR      Manual   (IE)         PROM with OP into shoulder flex, scap, IR/ER at 45 degrees abd [employer] SC SC SC                                                                Exercise Diary   (IE)         Pendulums CW/CCW/AP/ML 20 x ea NV 20 x ea 20x ea  20x ea    30x                      Seated:             - PROM flexion 10 x 10" NV 10 x 10"  np         - PROM scaption 10 x 10" NV 10 x 10"  np         - scapular retraction 10 x 10" NV 10 x 10" 10" x 10  10" x 10  10" x 10         - cervical retraction 10 x 10" NV 10 x 10" 10x 10"  10x 10"  10" x 10         - elbow flexion 2 x 10 NV 2x10 2x10  2x10  3x10         - wrist flex/ext 2 x 10 NV 2x10 2x 10  3x10  3x10         - pron/sup 2 x 10 NV 2x10 2x10  2x10  3x10                      Table slides flexion    10" x 10  10" x 10         Table slides scaption    10" x 10 10" x 10         Seated ER stretch    30" x 4  30" x 4                                                Standing:             - ER stretch in doorway 4 x 30" NV 4 x 30"   Home                                                                                           Modalities  7-16 (IE) 7/18 7/25 7/30

## 2019-08-01 ENCOUNTER — OFFICE VISIT (OUTPATIENT)
Dept: PHYSICAL THERAPY | Facility: CLINIC | Age: 74
End: 2019-08-01
Payer: MEDICARE

## 2019-08-01 DIAGNOSIS — S42.141A CLOSED FRACTURE OF RIM OF GLENOID FOSSA OF RIGHT SCAPULA, INITIAL ENCOUNTER: ICD-10-CM

## 2019-08-01 DIAGNOSIS — S42.254A CLOSED NONDISPLACED FRACTURE OF GREATER TUBEROSITY OF RIGHT HUMERUS, INITIAL ENCOUNTER: Primary | ICD-10-CM

## 2019-08-01 PROCEDURE — 97110 THERAPEUTIC EXERCISES: CPT

## 2019-08-01 PROCEDURE — 97140 MANUAL THERAPY 1/> REGIONS: CPT

## 2019-08-01 NOTE — PROGRESS NOTES
Daily Note     Today's date: 2019  Patient name: James Mari  : 1945  MRN: [id number]  Referring provider: Neto Batista MD  Dx:   Encounter Diagnosis     ICD-10-CM    1  Closed nondisplaced fracture of greater tuberosity of right humerus, initial encounter S42 254A    2  Closed fracture of rim of glenoid fossa of right scapula, initial encounter S42 141A        Start Time: 1215  Stop Time: 1253  Total time in clinic (min): 38 minutes  12:15pm to 12:19pm JG supervised time, 12:19pm to 1253pm   Subjective: Pt reported that he had pain this morning, he felt like he slept wrong on his R shoulder 2/10 early this morning, Pt reported next Wednesday he goes to see his MD        Objective: See treatment diary below      Assessment:  Continue to progress via protocol, overall patient able to perform all exercises with minimal discomfort  Pt reported post treatment session some discomfort  Tolerated treatment well  Patient demonstrated fatigue post treatment, exhibited good technique with therapeutic exercises and would benefit from continued PT      Plan: Continue per plan of care  Continue to progress via protocol,       Precautions: PROM only per MD script, No combined ext and IR      Manual  - (IE)  81       PROM with OP into shoulder flex, scap, IR/ER at 45 degrees abd [employer] SC SC SC SC                                                               Exercise Diary  - (IE)  8/1       Pendulums CW/CCW/AP/ML 20 x ea NV 20 x ea 20x ea  20x ea  30x  30x        Pulleys F and scap        2 min ea          Seated:             - PROM flexion 10 x 10" NV 10 x 10"  np  np       - PROM scaption 10 x 10" NV 10 x 10"  np  np       - scapular retraction 10 x 10" NV 10 x 10" 10" x 10  10" x 10  10" x 10  10x 10"        - cervical retraction 10 x 10" NV 10 x 10" 10x 10"  10x 10"  10" x 10  10" x 10        - elbow flexion 2 x 10 NV 2x10 2x10  2x10  3x10  3x10        - wrist flex/ext 2 x 10 NV 2x10 2x 10  3x10  3x10  3x10        - pron/sup 2 x 10 NV 2x10 2x10  2x10  3x10  3x10                     Table slides flexion    10" x 10  10" x 10         Table slides scaption    10" x 10  10" x 10         Seated ER stretch    30" x 4  30" x 4                                                Standing:             - ER stretch in doorway 4 x 30" NV 4 x 30"   Home                                                                                           Modalities  7-16 (IE) 7/18 7/25 7/30

## 2019-08-06 ENCOUNTER — OFFICE VISIT (OUTPATIENT)
Dept: PHYSICAL THERAPY | Facility: CLINIC | Age: 74
End: 2019-08-06
Payer: MEDICARE

## 2019-08-06 DIAGNOSIS — S42.141A CLOSED FRACTURE OF RIM OF GLENOID FOSSA OF RIGHT SCAPULA, INITIAL ENCOUNTER: ICD-10-CM

## 2019-08-06 DIAGNOSIS — S42.254A CLOSED NONDISPLACED FRACTURE OF GREATER TUBEROSITY OF RIGHT HUMERUS, INITIAL ENCOUNTER: Primary | ICD-10-CM

## 2019-08-06 PROCEDURE — 97110 THERAPEUTIC EXERCISES: CPT | Performed by: PHYSICAL THERAPIST

## 2019-08-06 PROCEDURE — 97112 NEUROMUSCULAR REEDUCATION: CPT | Performed by: PHYSICAL THERAPIST

## 2019-08-06 PROCEDURE — 97140 MANUAL THERAPY 1/> REGIONS: CPT | Performed by: PHYSICAL THERAPIST

## 2019-08-06 NOTE — PROGRESS NOTES
Daily Note     Today's date: 2019  Patient name: Sixto Jones  : 1945  MRN: [id number]  Referring provider: Rj Leo MD  Dx:   Encounter Diagnosis     ICD-10-CM    1  Closed nondisplaced fracture of greater tuberosity of right humerus, initial encounter S42 254A    2  Closed fracture of rim of glenoid fossa of right scapula, initial encounter S42 141A                   Subjective: Pain 1-2/10 "always there" in right shoulder  Objective: See treatment diary below      Assessment: Tolerated treatment well  Patient would benefit from continued PT  Good PROM noted in right shoulder  Had some pulling in shoulder with pulley flexion and abduction  Plan: Continue per plan of care  Precautions: PROM only per MD script, No combined ext and IR      Manual   (IE)  8/6      PROM with OP into shoulder flex, scap, IR/ER at 45 degrees abd [employer] SC SC SC SC JF                                                              Exercise Diary   (IE)  8/6      Pendulums CW/CCW/AP/ML 20 x ea NV 20 x ea 20x ea  20x ea  30x  30x  x30      Pulleys F and scap        2 min ea    x2' ea      Seated:             - PROM flexion 10 x 10" NV 10 x 10"  np  np       - PROM scaption 10 x 10" NV 10 x 10"  np  np       - scapular retraction 10 x 10" NV 10 x 10" 10" x 10  10" x 10  10" x 10  10x 10"  10x10"      - cervical retraction 10 x 10" NV 10 x 10" 10x 10"  10x 10"  10" x 10  10" x 10  10x10"      - elbow flexion 2 x 10 NV 2x10 2x10  2x10  3x10  3x10  3x10      - wrist flex/ext 2 x 10 NV 2x10 2x 10  3x10  3x10  3x10  3x10      - pron/sup 2 x 10 NV 2x10 2x10  2x10  3x10  3x10  3x10                   Table slides flexion    10" x 10  10" x 10  10x10" 10"x10      Table slides scaption    10" x 10  10" x 10  10"x10 10"x10      Seated ER stretch    30" x 4  30" x 4  4x30"  4x30"  VC's                                             Standing:             - ER stretch in doorway 4 x 30" NV 4 x 30"   Home                                                                                           Modalities  7-16 (IE) 7/18 7/25 7/30

## 2019-08-07 ENCOUNTER — APPOINTMENT (OUTPATIENT)
Dept: RADIOLOGY | Facility: AMBULARY SURGERY CENTER | Age: 74
End: 2019-08-07
Attending: ORTHOPAEDIC SURGERY
Payer: MEDICARE

## 2019-08-07 ENCOUNTER — OFFICE VISIT (OUTPATIENT)
Dept: OBGYN CLINIC | Facility: CLINIC | Age: 74
End: 2019-08-07

## 2019-08-07 VITALS
DIASTOLIC BLOOD PRESSURE: 81 MMHG | WEIGHT: 196 LBS | HEIGHT: 67 IN | SYSTOLIC BLOOD PRESSURE: 134 MMHG | HEART RATE: 84 BPM | BODY MASS INDEX: 30.76 KG/M2

## 2019-08-07 DIAGNOSIS — S42.254D CLOSED NONDISPLACED FRACTURE OF GREATER TUBEROSITY OF RIGHT HUMERUS WITH ROUTINE HEALING, SUBSEQUENT ENCOUNTER: Primary | ICD-10-CM

## 2019-08-07 DIAGNOSIS — M25.511 ACUTE PAIN OF RIGHT SHOULDER: ICD-10-CM

## 2019-08-07 DIAGNOSIS — S42.141D CLOSED FRACTURE OF RIM OF GLENOID FOSSA OF RIGHT SCAPULA WITH ROUTINE HEALING, SUBSEQUENT ENCOUNTER: ICD-10-CM

## 2019-08-07 PROCEDURE — 99024 POSTOP FOLLOW-UP VISIT: CPT | Performed by: ORTHOPAEDIC SURGERY

## 2019-08-07 PROCEDURE — 73030 X-RAY EXAM OF SHOULDER: CPT

## 2019-08-07 NOTE — PROGRESS NOTES
Patient Name:  Hillary Lemons  MRN:  [id number]    Assessment     1  Closed nondisplaced fracture of greater tuberosity of right humerus with routine healing, subsequent encounter     2  Acute pain of right shoulder  XR shoulder 2+ vw right   3  Closed fracture of rim of glenoid fossa of right scapula with routine healing, subsequent encounter         Plan     Right greater tuberosity fracture and small anteroinferior glenoid rim fracture (bony Bankart) secondary to right shoulder dislocation sustained on 06/23/2019  1  Continue physical therapy  2  Gradually return to normal activities as tolerated  3  Follow-up in six weeks for repeat evaluation with repeat x-rays of the right shoulder  Subjective     78-year-old male returns to the office today for follow-up regarding his Right greater tuberosity fracture and small anteroinferior glenoid rim fracture (bony Bankart) secondary to right shoulder dislocation sustained on 06/23/2019  Today he notes overall improvement  He still notes soreness in the right shoulder  He no longer utilizes the sling  He is participating in physical therapy  He does take Advil with relief  He denies numbness and tingling  No fevers or chills  Objective     /81   Pulse 84   Ht 5' 7" (1 702 m)   Wt 88 9 kg (196 lb)   BMI 30 70 kg/m²     Right shoulder:  No tenderness to palpation  No gross deformity  Skin intact  Passive range of motion includes 130° of forward flexion, 70° of external rotation-abduction, 50° of internal rotation-abduction  Negative the can test   Negative speed's test   Sensation intact axillary, median, ulnar and radial nerves  2+ radial pulse  Data Review     I have personally reviewed pertinent films in PACS, and my interpretation follows  X-rays performed today of the right shoulder reveals interval healing of greater tuberosity fracture without interval displacement  Stable alignment of glenoid rim fracture        Scribe Attestation    I,:   Caroline Millard PA-C am acting as a scribe while in the presence of the attending physician :        I,:   Geni Santana MD personally performed the services described in this documentation    as scribed in my presence :

## 2019-08-08 ENCOUNTER — OFFICE VISIT (OUTPATIENT)
Dept: PHYSICAL THERAPY | Facility: CLINIC | Age: 74
End: 2019-08-08
Payer: MEDICARE

## 2019-08-08 DIAGNOSIS — S42.254A CLOSED NONDISPLACED FRACTURE OF GREATER TUBEROSITY OF RIGHT HUMERUS, INITIAL ENCOUNTER: Primary | ICD-10-CM

## 2019-08-08 DIAGNOSIS — S42.141A CLOSED FRACTURE OF RIM OF GLENOID FOSSA OF RIGHT SCAPULA, INITIAL ENCOUNTER: ICD-10-CM

## 2019-08-08 PROCEDURE — 97140 MANUAL THERAPY 1/> REGIONS: CPT

## 2019-08-08 PROCEDURE — 97110 THERAPEUTIC EXERCISES: CPT

## 2019-08-08 NOTE — PROGRESS NOTES
Daily Note     Today's date: 2019  Patient name: Aby Lopez  : 1945  MRN: [id number]  Referring provider: Darrian Friedman MD  Dx:   Encounter Diagnosis     ICD-10-CM    1  Closed nondisplaced fracture of greater tuberosity of right humerus, initial encounter S42 254A    2  Closed fracture of rim of glenoid fossa of right scapula, initial encounter S42 141A        Start Time: 1215  Stop Time: 1300  Total time in clinic (min): 45 minutes  One on one treatment session from 12:17pm to 1:00pm    Subjective: pt reported having a little pain this morning  Pt reported her went to the ortho yesterday and said he can now gradually go back to normal activities , even mowing grass, (follow behind sachin)  Objective: See treatment diary below      Assessment:  Progressed to AROM today, moderate challenged minimal to no pain while performing, overall patient progressing well, patient educated to continue to perform the table slides at home to increase the ROM in his R shoulder, along with education on muscle soreness is normal especially for increasing number of reps,  Tolerated treatment well  Patient demonstrated fatigue post treatment, exhibited good technique with therapeutic exercises and would benefit from continued PT, post treatment patient reported minimal muscle soreness no pain  Plan: Continue per plan of care  Precautions: Pendulums and PROM R shoulder, Starts AAROM 19, and AROM 19, elbow and wrist ROM and  exercises as tolerated; modalities for 6 weeks  Manual  - (IE)  8 8/     PROM with OP into shoulder flex, scap, IR/ER at 45 degrees abd [employer] SC SC SC SC JF SC                                                             Exercise Diary  - (IE)  8     Pendulums CW/CCW/AP/ML 20 x ea NV 20 x ea 20x ea  20x ea  30x  30x  x30 x30      Pulleys F and scap        2 min ea  x2' ea X 2' ea        Seated: - PROM flexion 10 x 10" NV 10 x 10"  np  np  np     - PROM scaption 10 x 10" NV 10 x 10"  np  np  np     - scapular retraction 10 x 10" NV 10 x 10" 10" x 10  10" x 10  10" x 10  10x 10"  10x10" 10x 10"     - cervical retraction 10 x 10" NV 10 x 10" 10x 10"  10x 10"  10" x 10  10" x 10  10x10" 10x 10"      - elbow flexion 2 x 10 NV 2x10 2x10  2x10  3x10  3x10  3x10 3x10      - wrist flex/ext 2 x 10 NV 2x10 2x 10  3x10  3x10  3x10  3x10 3x10      - pron/sup 2 x 10 NV 2x10 2x10  2x10  3x10  3x10  3x10 3x10                   Table slides flexion    10" x 10  10" x 10  10x10" 10"x10 HEP     Table slides scaption    10" x 10  10" x 10  10"x10 10"x10 HEP     Seated ER stretch    30" x 4  30" x 4  4x30"  4x30"  VC's HEP                  Supine AAROMcane flexion        6" 20x      Supine AAROM cane scaption        6" x 20      Supine AAROM ER         6" x 20                   Sidelying:              S/L flexion        2x10      S/L ER        2x 10      S/L abd        2x10                                Standing:             - ER stretch in doorway 4 x 30" NV 4 x 30"   Home    HEP                                                                                       Modalities  7-16 (IE) 7/18 7/25 7/30 8/8             declined

## 2019-08-13 ENCOUNTER — EVALUATION (OUTPATIENT)
Dept: PHYSICAL THERAPY | Facility: CLINIC | Age: 74
End: 2019-08-13
Payer: MEDICARE

## 2019-08-13 DIAGNOSIS — S42.141A CLOSED FRACTURE OF RIM OF GLENOID FOSSA OF RIGHT SCAPULA, INITIAL ENCOUNTER: ICD-10-CM

## 2019-08-13 DIAGNOSIS — S42.254A CLOSED NONDISPLACED FRACTURE OF GREATER TUBEROSITY OF RIGHT HUMERUS, INITIAL ENCOUNTER: Primary | ICD-10-CM

## 2019-08-13 PROCEDURE — 97140 MANUAL THERAPY 1/> REGIONS: CPT | Performed by: PHYSICAL THERAPIST

## 2019-08-13 PROCEDURE — 97110 THERAPEUTIC EXERCISES: CPT | Performed by: PHYSICAL THERAPIST

## 2019-08-13 NOTE — PROGRESS NOTES
PT Re-Evaluation     Today's date: 2019  Patient name: Jose L Stout  : 1945  MRN: [id number]  Referring provider: Martha Blankenship MD  Dx:   Encounter Diagnosis     ICD-10-CM    1  Closed nondisplaced fracture of greater tuberosity of right humerus, initial encounter S42 254A    2  Closed fracture of rim of glenoid fossa of right scapula, initial encounter S42 141A                   Assessment  Assessment details: Jose L Stout  is a pleasant 66 y o  presenting to physical therapy with MD referral for Closed nondisplaced fracture of greater tuberosity of right humerus, initial encounter, and Closed fracture of rim of glenoid fossa of right scapula, initial encounter  Since time of initial evaluation, pt has made good improvement in shoulder A/PROM as well as upper extremity strength  Problem list: Poor posture, decreased shoulder A/AA/PROM, and decreased upper extremity strength  Treatment to include: Manual therapy techniques, shoulder A/AA/PROM, RTC/periscapular strengthening when permitted by MD, instruction in a comprehensive HEP, and modalities as needed  This pt would benefit from skilled PT services to address their impairments and functional limitations to maximize functional outcome  Symptom irritability: lowBarriers to therapy: age  Understanding of Dx/Px/POC: good   Prognosis: good    Goals  ST  Pt will improve shoulder flexion PROM to at least 155 degrees in 4 weeks  PARTIALLY MET  2  Pt will improve shoulder scaption PROM to at least 155 degrees in 4 weeks  PARTIALLY MET    LT  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 with minimal to no pain in  4 weeks  NOT MET  2  Pt will be independent in a comprehensive HEP in 4 weeks   PARTIALLY MET    Plan  Patient would benefit from: skilled physical therapy  Frequency: 2x week  Duration in weeks: 4  Treatment plan discussed with: patient        Subjective Evaluation    History of Present Illness  Date of onset: 6/23/2019  Mechanism of injury: trauma  Mechanism of injury: Pt reports he was using a push mower over his septic mound and the mower caused him to loose his balance and fall into a tree backwards  Pt states he was unable to move his right arm and was in extreme pain  Pt states his neighbor helped pt get stable and his wife drove him to Urgent Care where x-rays were taken revealing anterior shoulder dislocation  Pt was taken to the hospital via his wife where he was taken back into a room where they reset his shoulder  Pt states he followed-up with ortho who educated pt to start PROM only  Pt denies any numbness or tingling in his arm or any cold sensation in arm  Premorbid status:  - ADLs: Independent with no difficulty  - Work: Not a working individual- retired  - Recreation: walking for exercise    Current status:  - ADLs/Functional activities:     - Reaching into shoulder height cabinets with Pain Levels: no, no difficulty   - Reaching into overhead cabinets with Pain Levels: minimal pain, no difficulty   - Washing lower back/tucking in shirt with Pain Levels: no pain/difficulty   - Washing hair with modified technique   - Driving with both arms with no pain   - Lifting a gallon of milk with no pain   - Carrying gallon of milk with no pain   - Sleeping with 0 nightly sleep disturbances due to pain  - Work: Not a working individual- retired  - Recreation: none    Since time of initial evaluation, functionally pt states he is back to 65-70% of his premorbid status  Pt reports he can now reach into shoulder height and overhead cabinets without pain or difficulty and can now lift/carry a gallon of milk without pain  Pt states he has not tried lifting or pulling too much weight, using his arms to support his body weight, or pulling to start a lawnmower  Although improvements have been made, this pt would continue to benefit from skilled PT services to maximize functional outcome    Pain  Current pain ratin  At best pain ratin  At worst pain ratin  Location: Lateral aspect of shoulder  Quality: discomfort  Relieving factors: medications and ice  Aggravating factors: overhead activity  Progression: improved      Diagnostic Tests  X-ray: abnormal  Treatments  Treatments tried: reset shoulder in ER  Current treatment: medication and physical therapy  Patient Goals  Patient goals for therapy: increased motion, decreased pain, increased strength and independence with ADLs/IADLs          Objective     Postural Observations  Seated posture: fair  Standing posture: fair    Additional Postural Observation Details  Moderate forward head and forward shoulder posture    Observations     Additional Observation Details  No bruising present on RUE    Palpation     Additional Palpation Details  Increased TTP over corocoid process on RUE     Active Range of Motion   Left Shoulder   Flexion: 155 degrees   Abduction: 145 degrees   External rotation BTH: T5   Internal rotation BTB: T7     Right Shoulder   Flexion: 123 degrees   Abduction: 93 degrees   External rotation BTH: T2   Internal rotation BTB: T12     Passive Range of Motion     Right Shoulder   Flexion: 151 degrees with pain  Abduction: 145 degrees with pain  External rotation 45°: 73 degrees with pain  External rotation 90°: 72 degrees   Internal rotation 45°: 60 degrees   Internal rotation 90°: 37 degrees     Strength/Myotome Testing     Left Shoulder     Planes of Motion   Flexion: 5   Abduction: 5   External rotation at 0°: 5     Right Shoulder     Planes of Motion   Flexion: 4   Abduction: 4   External rotation at 0°: 4   Internal rotation at 0°: 4     Isolated Muscles   Biceps: 5   Triceps: 5     Additional Strength Details  Only strength tested to a 4/5 due to fracture presence            Precautions: Pendulums and PROM R shoulder, Starts AAROM 19, and AROM 19, elbow and wrist ROM and  exercises as tolerated; modalities for 6 weeks   Per [employer] Kapil Granados PA-C begin strengthening 10 weeks after injury date (Sept 1st)        Manual  7-16 (IE) 7/18 7/23 7/25 7/30 8/1 8/6 8/8  8-13 (RE)     PROM with OP into shoulder flex, scap, IR/ER at 45 degrees abd JG [employer] SC SC SC SC JF SC        PROM with OP into shoulder flex, scap, IR/ER at 90 degrees abd                  JG                                                                                   Exercise Diary  7-16 (IE) 7/18 7/23 7/25 7/30 8/1 8/6 8/8  8-13 (RE)     Pendulums CW/CCW/AP/ML 20 x ea NV 20 x ea 20x ea  20x ea  30x  30x  x30 x30   20 x ea     Pulleys Flexion and scap             2 min ea    x2' ea X 2' ea    2' x 6" hold ea     Seated:                       - PROM flexion 10 x 10" NV 10 x 10"   np   np   np       - PROM scaption 10 x 10" NV 10 x 10"   np   np   np       - scapular retraction 10 x 10" NV 10 x 10" 10" x 10  10" x 10  10" x 10  10x 10"  10x10" 10x 10"  HEP     - cervical retraction 10 x 10" NV 10 x 10" 10x 10"  10x 10"  10" x 10  10" x 10  10x10" 10x 10"   HEP     - elbow flexion 2 x 10 NV 2x10 2x10  2x10  3x10  3x10  3x10 3x10   NV     - wrist flex/ext 2 x 10 NV 2x10 2x 10  3x10  3x10  3x10  3x10 3x10   HEP     - pron/sup 2 x 10 NV 2x10 2x10  2x10  3x10  3x10  3x10 3x10   HEP                             Table slides flexion       10" x 10  10" x 10  10x10" 10"x10 HEP       Table slides scaption       10" x 10  10" x 10  10"x10 10"x10 HEP       Seated ER stretch       30" x 4  30" x 4  4x30"  4x30"  VC's HEP                               Supine AAROMcane flexion               6" 20x   10 x 6" hold     Supine AAROM cane scaption               6" x 20   10 x 6" hold     Supine AAROM ER                6" x 20   10 x 6" hold                             Sidelying:                        S/L flexion               2x10   5 x     S/L ER               2x 10   5 x     S/L abd               2x10   5 x                                                     Standing:                       - ER stretch in doorway 4 x 30" NV 4 x 30"     Home      HEP        - flexion                   10 x ea NV     - scaption                  10 x ea NV      - abduction                  10 x ea NV                              Prone:                        - rows                  10 x ea NV     - extension         10 x ea NV    - horizontal abduction         10 x ea NV                                    Modalities  7-16 (IE) 7/18 7/25 7/30 8/8                       declined

## 2019-08-13 NOTE — PROGRESS NOTES
Addendum: changed title of note to RE and added in response from PA that pt can begin strengthening 10 weeks after date of injury (10 weeks)

## 2019-08-15 ENCOUNTER — OFFICE VISIT (OUTPATIENT)
Dept: PHYSICAL THERAPY | Facility: CLINIC | Age: 74
End: 2019-08-15
Payer: MEDICARE

## 2019-08-15 DIAGNOSIS — S42.141A CLOSED FRACTURE OF RIM OF GLENOID FOSSA OF RIGHT SCAPULA, INITIAL ENCOUNTER: ICD-10-CM

## 2019-08-15 DIAGNOSIS — S42.254A CLOSED NONDISPLACED FRACTURE OF GREATER TUBEROSITY OF RIGHT HUMERUS, INITIAL ENCOUNTER: Primary | ICD-10-CM

## 2019-08-15 PROCEDURE — 97140 MANUAL THERAPY 1/> REGIONS: CPT | Performed by: PHYSICAL THERAPIST

## 2019-08-15 PROCEDURE — 97110 THERAPEUTIC EXERCISES: CPT | Performed by: PHYSICAL THERAPIST

## 2019-08-15 NOTE — PROGRESS NOTES
Daily Note     Today's date: 8/15/2019  Patient name: Alejandro Kuhn  : 1945  MRN: [id number]  Referring provider: Ayala Springer MD  Dx:   Encounter Diagnosis     ICD-10-CM    1  Closed nondisplaced fracture of greater tuberosity of right humerus, initial encounter S42 254A    2  Closed fracture of rim of glenoid fossa of right scapula, initial encounter S42 141A                   Subjective: Patient reports no adverse reactions following previous session  Pt reports compliance with HEP; however, not as frequent as advised  Objective: See treatment diary below      Assessment: Progressed exercises this session to include prone periscapular strengthening exercises as well as AAROM wall ladder and AROM shoulder strengthening  Tolerated treatment well  Patient demonstrated fatigue post treatment, exhibited good technique with therapeutic exercises and would benefit from continued PT      Plan: Progress treatment as tolerated  Precautions: Pendulums and PROM R shoulder, Starts AAROM 19, and AROM 19, elbow and wrist ROM and  exercises as tolerated; modalities for 6 weeks  Per Millie Musa PA-C begin strengthening 10 weeks after injury date ()        Manual  7-16 (IE)  8  8-13 (RE)  8-15   PROM with OP into shoulder flex, scap, IR/ER at 45 degrees abd JG [employer] SC SC SC SC JF SC        PROM with OP into shoulder flex, scap, IR/ER at 90 degrees abd                  JG  JG                                                                                 Exercise Diary  7-16 (IE)  8 8  8-13 (RE)  8-15   Pendulums CW/CCW/AP/ML 20 x ea NV 20 x ea 20x ea  20x ea  30x  30x  x30 x30   20 x ea  HEP   Pulleys Flexion and scap             2 min ea    x2' ea X 2' ea    2' x 6" hold ea  2 min x 6" hold ea                Standing:             - wall ladder flex          10 x 10"    - wall ladder scaption          10 x 10" Seated:                       - PROM flexion 10 x 10" NV 10 x 10"   np   np   np       - PROM scaption 10 x 10" NV 10 x 10"   np   np   np       - scapular retraction 10 x 10" NV 10 x 10" 10" x 10  10" x 10  10" x 10  10x 10"  10x10" 10x 10"  HEP     - cervical retraction 10 x 10" NV 10 x 10" 10x 10"  10x 10"  10" x 10  10" x 10  10x10" 10x 10"   HEP     - elbow flexion 2 x 10 NV 2x10 2x10  2x10  3x10  3x10  3x10 3x10   NV     - wrist flex/ext 2 x 10 NV 2x10 2x 10  3x10  3x10  3x10  3x10 3x10   HEP     - pron/sup 2 x 10 NV 2x10 2x10  2x10  3x10  3x10  3x10 3x10   HEP                             Table slides flexion       10" x 10  10" x 10  10x10" 10"x10 HEP       Table slides scaption       10" x 10  10" x 10  10"x10 10"x10 HEP       Seated ER stretch       30" x 4  30" x 4  4x30"  4x30"  VC's HEP                               Supine AAROM cane flexion               6" 20x   10 x 6" hold  10 x 6" hold   Supine AAROM cane scaption               6" x 20   10 x 6" hold  4 x 6" hold    Supine AAROM ER                6" x 20   10 x 6" hold  5 x 6" hold                           Sidelying:                        S/L flexion               2x10   5 x  10 x   S/L ER               2x 10   5 x  10 x   S/L abd               2x10   5 x  10 x                                                   Standing:                       - ER stretch in doorway 4 x 30" NV 4 x 30"     Home      HEP        - flexion                   10 x ea NV  10 x ea   - scaption                  10 x ea NV  10 x ea    - abduction                  10 x ea NV  10 x ea                            Prone:                        - rows                  10 x ea NV  10 x    - extension         10 x ea NV  10 x    - horizontal abduction         10 x ea NV 10 x                                   Modalities  7-16 (IE) 7/18 7/25 7/30 8/8                       declined

## 2019-08-20 ENCOUNTER — OFFICE VISIT (OUTPATIENT)
Dept: PHYSICAL THERAPY | Facility: CLINIC | Age: 74
End: 2019-08-20
Payer: MEDICARE

## 2019-08-20 DIAGNOSIS — S42.254A CLOSED NONDISPLACED FRACTURE OF GREATER TUBEROSITY OF RIGHT HUMERUS, INITIAL ENCOUNTER: Primary | ICD-10-CM

## 2019-08-20 DIAGNOSIS — S42.141A CLOSED FRACTURE OF RIM OF GLENOID FOSSA OF RIGHT SCAPULA, INITIAL ENCOUNTER: ICD-10-CM

## 2019-08-20 PROCEDURE — 97140 MANUAL THERAPY 1/> REGIONS: CPT | Performed by: PHYSICAL THERAPIST

## 2019-08-20 PROCEDURE — 97110 THERAPEUTIC EXERCISES: CPT | Performed by: PHYSICAL THERAPIST

## 2019-08-22 ENCOUNTER — OFFICE VISIT (OUTPATIENT)
Dept: PHYSICAL THERAPY | Facility: CLINIC | Age: 74
End: 2019-08-22
Payer: MEDICARE

## 2019-08-22 DIAGNOSIS — S42.254A CLOSED NONDISPLACED FRACTURE OF GREATER TUBEROSITY OF RIGHT HUMERUS, INITIAL ENCOUNTER: Primary | ICD-10-CM

## 2019-08-22 DIAGNOSIS — S42.141A CLOSED FRACTURE OF RIM OF GLENOID FOSSA OF RIGHT SCAPULA, INITIAL ENCOUNTER: ICD-10-CM

## 2019-08-22 PROCEDURE — 97110 THERAPEUTIC EXERCISES: CPT | Performed by: PHYSICAL THERAPIST

## 2019-08-22 PROCEDURE — 97140 MANUAL THERAPY 1/> REGIONS: CPT | Performed by: PHYSICAL THERAPIST

## 2019-08-22 NOTE — PROGRESS NOTES
Daily Note     Today's date: 2019  Patient name: Aggie Jacobs  : 1945  MRN: [id number]  Referring provider: Marti Levine MD  Dx:   Encounter Diagnosis     ICD-10-CM    1  Closed nondisplaced fracture of greater tuberosity of right humerus, initial encounter S42 254A    2  Closed fracture of rim of glenoid fossa of right scapula, initial encounter S42 141A                   Subjective: Patient reports he is feeling pretty good today related to his shoulder  Objective: See treatment diary below      Assessment: Progressed exercises this session to include increased sets of AROM exercises  Initiated shoulder extension and IR stretches this date to facilitate pt washing lower back/tucking in shirt  Pt was able to tolerate all progressions this date without complaints of pain, rather only fatigue  Tolerated treatment well  Patient demonstrated fatigue post treatment and would benefit from continued PT      Plan: Progress treatment as tolerated  Precautions: Pendulums and PROM R shoulder, Starts AAROM 19, and AROM 19, elbow and wrist ROM and  exercises as tolerated; modalities for 6 weeks  Per Ben Maradiaga PA-C begin strengthening 10 weeks after injury date ()        Manual    8-13 (RE)  8-15   PROM with OP into shoulder flex, scap, IR/ER at 45 degrees abd JG JG   SC SC JF SC        PROM with OP into shoulder flex, scap, IR/ER at 90 degrees abd               JG  JG                                                                                 Exercise Diary    8-13 (RE)  8-15   Pulleys Flexion and scap   2 min x 6" hold ea 2 min x 6" hold ea     2 min ea    x2' ea X 2' ea    2' x 6" hold ea  2 min x 6" hold ea                Standing:             - wall ladder flex 10 x 10" 10 x 10"        10 x 10"    - wall ladder scaption 10 x 10" 10 x 10"        10 x 10"    - cane AAROM extension 10 x 10" NV 10 x 10" - cane AROM IR 10 x 10" NV 10 x 10"                        Seated:                                           Supine AAROM cane flexion  15 x 6"  HEP only           6" 20x   10 x 6" hold  10 x 6" hold   Supine AAROM cane scaption  10 x 6"  HEP only           6" x 20   10 x 6" hold  4 x 6" hold    Supine AAROM ER                6" x 20   10 x 6" hold  5 x 6" hold                           Sidelying:                        S/L flexion  10 x  10 x           2x10   5 x  10 x   S/L ER  10 x  2 x 10           2x 10   5 x  10 x   S/L abd  10 x  10 x           2x10   5 x  10 x                                                   Standing:                       - ER stretch in doorway       Home      HEP        - flexion   2 x 10 ea 2 x 10 ea              10 x ea NV  10 x ea   - scaption  10 x ea  2 x 10 ea              10 x ea NV  10 x ea    - abduction  10 x ea  2 x 10 ea              10 x ea NV  10 x ea    - sleeper stretch at wall 4 x 30" NV  4 x 30"                    Prone:                        - rows  10 x  10 x   NV 2 x 10            10 x ea NV  10 x    - extension 10 x 10 x NV 2 x 10      10 x ea NV  10 x    - horizontal abduction 10 x 10 x NV 2 x 10      10 x ea NV 10 x                                   Modalities  7-16 (IE) 7/18 7/25 7/30 8/8                       declined

## 2019-08-27 ENCOUNTER — OFFICE VISIT (OUTPATIENT)
Dept: PHYSICAL THERAPY | Facility: CLINIC | Age: 74
End: 2019-08-27
Payer: MEDICARE

## 2019-08-27 DIAGNOSIS — S42.141A CLOSED FRACTURE OF RIM OF GLENOID FOSSA OF RIGHT SCAPULA, INITIAL ENCOUNTER: ICD-10-CM

## 2019-08-27 DIAGNOSIS — S42.254A CLOSED NONDISPLACED FRACTURE OF GREATER TUBEROSITY OF RIGHT HUMERUS, INITIAL ENCOUNTER: Primary | ICD-10-CM

## 2019-08-27 PROCEDURE — 97110 THERAPEUTIC EXERCISES: CPT | Performed by: PHYSICAL THERAPIST

## 2019-08-27 PROCEDURE — 97140 MANUAL THERAPY 1/> REGIONS: CPT | Performed by: PHYSICAL THERAPIST

## 2019-08-27 NOTE — PROGRESS NOTES
Daily Note     Today's date: 2019  Patient name: Lorne Sweeney  : 1945  MRN: [id number]  Referring provider: Kortney Ceron MD  Dx:   Encounter Diagnosis     ICD-10-CM    1  Closed nondisplaced fracture of greater tuberosity of right humerus, initial encounter S42 254A    2  Closed fracture of rim of glenoid fossa of right scapula, initial encounter S42 141A                   Subjective: Patient reports he has been experiencing pain in his left shoulder and forearm for the past 5 days  Objective: See treatment diary below      Assessment: Modified exercises this date due to reports of increase in pain over the past few days  Pt was flexing wrist significantly while performing cane exercises which reproduced forearm discomfort  Educated pt to ensure neutral wrist positioning which resolved the pain  Minimal increase in pain noted following manual therapy techniques this date  Educated pt to continue to communicate well with PT while manual stretching is performed to notify of pain levels  Tolerated treatment well  Patient demonstrated fatigue post treatment and would benefit from continued PT      Plan: Progress treatment as tolerated  Precautions: Pendulums and PROM R shoulder, Starts AAROM 19, and AROM 19, elbow and wrist ROM and  exercises as tolerated; modalities for 6 weeks  Per Bo Griffith PA-C begin strengthening 10 weeks after injury date ()        Manual  - (RE)  8-15   PROM with OP into shoulder flex, scap, IR/ER at 45 degrees abd      SC JF SC        PROM with OP into shoulder flex, scap, IR/ER at 90 degrees abd JG JG JG            JG  JG                                                                                 Exercise Diary    8-13 (RE)  8-15   Pulleys Flexion and scap   2 min x 6" hold ea 2 min x 6" hold ea 2 min x 6" hold ea   2 min ea    x2' ea X 2' ea    2' x 6" hold ea  2 min x 6" hold ea                Standing:             - wall ladder flex 10 x 10" 10 x 10" 10 x 10"       10 x 10"    - wall ladder scaption 10 x 10" 10 x 10" 10 x 10"       10 x 10"    - cane AAROM extension 10 x 10" NV 10 x 10" 10 x 10"          - cane AROM IR 10 x 10" NV 10 x 10" 10 x 10"                       Seated:                                         Supine AAROM cane flexion  15 x 6"  HEP only          6" 20x   10 x 6" hold  10 x 6" hold   Supine AAROM cane scaption  10 x 6"  HEP only          6" x 20   10 x 6" hold  4 x 6" hold    Supine AAROM ER               6" x 20   10 x 6" hold  5 x 6" hold                          Sidelying:                       S/L flexion  10 x  10 x  10 x        2x10   5 x  10 x   S/L ER  10 x  2 x 10  10 x        2x 10   5 x  10 x   S/L abd  10 x  10 x  10 x        2x10   5 x  10 x                                                 Standing:                      - ER stretch in doorway            HEP        - flexion   2 x 10 ea 2 x 10 ea  10 x           10 x ea NV  10 x ea   - scaption  10 x ea  2 x 10 ea  10 x           10 x ea NV  10 x ea    - abduction  10 x ea  2 x 10 ea  10 x           10 x ea NV  10 x ea    - sleeper stretch at wall 4 x 30" NV  4 x 30"                   Prone:                        - rows  10 x  10 x   10 x            10 x ea NV  10 x    - extension 10 x 10 x 10 x      10 x ea NV  10 x    - horizontal abduction 10 x 10 x 10 x      10 x ea NV 10 x                                   Modalities  7-16 (IE) 7/18 7/25 7/30 8/8    8-27        Cryo           declined      10'

## 2019-08-29 ENCOUNTER — OFFICE VISIT (OUTPATIENT)
Dept: PHYSICAL THERAPY | Facility: CLINIC | Age: 74
End: 2019-08-29
Payer: MEDICARE

## 2019-08-29 DIAGNOSIS — S42.254A CLOSED NONDISPLACED FRACTURE OF GREATER TUBEROSITY OF RIGHT HUMERUS, INITIAL ENCOUNTER: Primary | ICD-10-CM

## 2019-08-29 DIAGNOSIS — S42.141A CLOSED FRACTURE OF RIM OF GLENOID FOSSA OF RIGHT SCAPULA, INITIAL ENCOUNTER: ICD-10-CM

## 2019-08-29 PROCEDURE — 97140 MANUAL THERAPY 1/> REGIONS: CPT

## 2019-08-29 PROCEDURE — 97110 THERAPEUTIC EXERCISES: CPT

## 2019-08-29 NOTE — PROGRESS NOTES
Daily Note     Today's date: 2019  Patient name: Aggie Jacobs  : 1945  MRN: [id number]  Referring provider: Marti Levine MD  Dx:   Encounter Diagnosis     ICD-10-CM    1  Closed nondisplaced fracture of greater tuberosity of right humerus, initial encounter S42 254A    2  Closed fracture of rim of glenoid fossa of right scapula, initial encounter S42 141A                   Subjective: Patient reports moderate soreness in R shoulder arriving to therapy  Objective: See treatment diary below      Assessment; Tolerated treatment well  Patient demonstrated wrist nuetral with cane exercies compared to increase flexion last visit  Patient did require some cuing for correct technique with S/L exercises  Progressed patient to 1# with prone exercises, good tolerance no increase in pain  Patient presents with muscle guarding into PROM, moderate restrictions into abduction, ER/IR  Patient demonstrated fatigue post treatment and would benefit from continued PT      Plan: Progress treatment as tolerated  Precautions: Pendulums and PROM R shoulder, Starts AAROM 19, and AROM 19, elbow and wrist ROM and  exercises as tolerated; modalities for 6 weeks  Per Ben Maradiaga PA-C begin strengthening 10 weeks after injury date ()        Manual    8-13 (RE)  8-15   PROM with OP into shoulder flex, scap, IR/ER at 45 degrees abd      SC JF SC        PROM with OP into shoulder flex, scap, IR/ER at 90 degrees abd JG JG JG            JG  JG                                                                                 Exercise Diary    8-13 (RE)  8-15   Pulleys Flexion and scap   2 min x 6" hold ea 2 min x 6" hold ea 2 min x 6" hold ea 2 min  X6"  Hold ea  2 min ea    x2' ea X 2' ea    2' x 6" hold ea  2 min x 6" hold ea                Standing:             - wall ladder flex 10 x 10" 10 x 10" 10 x 10" 10x  10"      10 x 10" - wall ladder scaption 10 x 10" 10 x 10" 10 x 10" 10x  10"      10 x 10"    - cane AAROM extension 10 x 10" NV 10 x 10" 10 x 10" 10x  10"         - cane AROM IR 10 x 10" NV 10 x 10" 10 x 10" 10x  10"                      Seated:                                         Supine AAROM cane flexion  15 x 6"  HEP only          6" 20x   10 x 6" hold  10 x 6" hold   Supine AAROM cane scaption  10 x 6"  HEP only          6" x 20   10 x 6" hold  4 x 6" hold    Supine AAROM ER               6" x 20   10 x 6" hold  5 x 6" hold                          Sidelying:                       S/L flexion  10 x  10 x  10 x  10x      2x10   5 x  10 x   S/L ER  10 x  2 x 10  10 x  10x      2x 10   5 x  10 x   S/L abd  10 x  10 x  10 x  10x      2x10   5 x  10 x                                                 Standing:                      - ER stretch in doorway            HEP        - flexion   2 x 10 ea 2 x 10 ea  10 x  10x         10 x ea NV  10 x ea   - scaption  10 x ea  2 x 10 ea  10 x  10x         10 x ea NV  10 x ea    - abduction  10 x ea  2 x 10 ea  10 x  10x         10 x ea NV  10 x ea    - sleeper stretch at wall 4 x 30" NV  4 x 30"                   Prone:                        - rows  10 x  10 x   10 x  10x 1#          10 x ea NV  10 x    - extension 10 x 10 x 10 x 10x  1#     10 x ea NV  10 x    - horizontal abduction 10 x 10 x 10 x 10x  1#     10 x ea NV 10 x                                   Modalities  7-16 (IE) 7/18 7/25 7/30 8/8    8-27        Cryo           declined      10'

## 2019-09-03 ENCOUNTER — OFFICE VISIT (OUTPATIENT)
Dept: PHYSICAL THERAPY | Facility: CLINIC | Age: 74
End: 2019-09-03
Payer: MEDICARE

## 2019-09-03 DIAGNOSIS — S42.141A CLOSED FRACTURE OF RIM OF GLENOID FOSSA OF RIGHT SCAPULA, INITIAL ENCOUNTER: ICD-10-CM

## 2019-09-03 DIAGNOSIS — S42.254A CLOSED NONDISPLACED FRACTURE OF GREATER TUBEROSITY OF RIGHT HUMERUS, INITIAL ENCOUNTER: Primary | ICD-10-CM

## 2019-09-03 PROCEDURE — 97110 THERAPEUTIC EXERCISES: CPT

## 2019-09-03 PROCEDURE — 97140 MANUAL THERAPY 1/> REGIONS: CPT

## 2019-09-03 NOTE — PROGRESS NOTES
Daily Note     Today's date: 9/3/2019  Patient name: George Monique  : 1945  MRN: [id number]  Referring provider: Jacki Hayes MD  Dx:   Encounter Diagnosis     ICD-10-CM    1  Closed nondisplaced fracture of greater tuberosity of right humerus, initial encounter S42 254A    2  Closed fracture of rim of glenoid fossa of right scapula, initial encounter S42 141A            Patient was 1:1 from 1:58pm-2:30pm       Subjective: Patient states he is feeling "okay" today  Continues to notice improvements with therapy but still has continued pain  Objective: See treatment diary below      Assessment: Tolerated treatment well  Patient continues to make slow progress towards goals  Progressed patient with standing shoulder AROM, demonstrated fatigue post  Mild limitations into PROM, most restricted into abduction  Deferred cold pack today due to afternoon plans  Patient demonstrated fatigue post treatment, exhibited good technique with therapeutic exercises and would benefit from continued PT      Plan: Continue per plan of care  Progress treatment as tolerated  Precautions: Pendulums and PROM R shoulder, Starts AAROM 19, and AROM 19, elbow and wrist ROM and  exercises as tolerated; modalities for 6 weeks  Per Francois Renee PA-C begin strengthening 10 weeks after injury date ()        Manual  8-20 8-22 8-27 8/29 9/3 8/1 8/6 8/8  8- (RE)  8-15   PROM with OP into shoulder flex, scap, IR/ER at 45 degrees abd      SC JF SC        PROM with OP into shoulder flex, scap, IR/ER at 90 degrees abd JG JG JG            JG  JG                                                                                 Exercise Diary  8-20 8-22 8-27 8/29 9/3 8/1 8/6 8/8  8-13 (RE)  8-15   Pulleys Flexion and scap   2 min x 6" hold ea 2 min x 6" hold ea 2 min x 6" hold ea 2 min  X6"  Hold ea 2 min  X6"  Hold ea 2 min ea    x2' ea X 2' ea    2' x 6" hold ea  2 min x 6" hold ea                Standing: - wall ladder flex 10 x 10" 10 x 10" 10 x 10" 10x  10" 10x  10"     10 x 10"    - wall ladder scaption 10 x 10" 10 x 10" 10 x 10" 10x  10" 10x  10"     10 x 10"    - cane AAROM extension 10 x 10" NV 10 x 10" 10 x 10" 10x  10" 10x  10"        - cane AROM IR 10 x 10" NV 10 x 10" 10 x 10" 10x  10" 10x  10"                     Seated:                                         Supine AAROM cane flexion  15 x 6"  HEP only          6" 20x   10 x 6" hold  10 x 6" hold   Supine AAROM cane scaption  10 x 6"  HEP only          6" x 20   10 x 6" hold  4 x 6" hold    Supine AAROM ER               6" x 20   10 x 6" hold  5 x 6" hold                          Sidelying:                       S/L flexion  10 x  10 x  10 x  10x 10x     2x10   5 x  10 x   S/L ER  10 x  2 x 10  10 x  10x 10x     2x 10   5 x  10 x   S/L abd  10 x  10 x  10 x  10x 10x     2x10   5 x  10 x                                                 Standing:                      - ER stretch in doorway            HEP        - flexion   2 x 10 ea 2 x 10 ea  10 x  10x 1#  10x        10 x ea NV  10 x ea   - scaption  10 x ea  2 x 10 ea  10 x  10x 1#  10x        10 x ea NV  10 x ea    - abduction  10 x ea  2 x 10 ea  10 x  10x 1#  10x        10 x ea NV  10 x ea    - sleeper stretch at wall 4 x 30" NV  4 x 30"                   Prone:                        - rows  10 x  10 x   10 x  10x 1#  10x  1#        10 x ea NV  10 x    - extension 10 x 10 x 10 x 10x  1# 10x  1#    10 x ea NV  10 x    - horizontal abduction 10 x 10 x 10 x 10x  1#     10 x ea NV 10 x                                   Modalities  7-16 (IE) 7/18 7/25 7/30 8/8    8-27  9/3      Cryo           declined      10'  def

## 2019-09-05 ENCOUNTER — OFFICE VISIT (OUTPATIENT)
Dept: PHYSICAL THERAPY | Facility: CLINIC | Age: 74
End: 2019-09-05
Payer: MEDICARE

## 2019-09-05 DIAGNOSIS — S42.141A CLOSED FRACTURE OF RIM OF GLENOID FOSSA OF RIGHT SCAPULA, INITIAL ENCOUNTER: ICD-10-CM

## 2019-09-05 DIAGNOSIS — S42.254A CLOSED NONDISPLACED FRACTURE OF GREATER TUBEROSITY OF RIGHT HUMERUS, INITIAL ENCOUNTER: Primary | ICD-10-CM

## 2019-09-05 PROCEDURE — 97110 THERAPEUTIC EXERCISES: CPT

## 2019-09-05 NOTE — PROGRESS NOTES
Daily Note     Today's date: 2019  Patient name: Lori Blackwell  : 1945  MRN: [id number]  Referring provider: Fredi Raymond MD  Dx:   Encounter Diagnosis     ICD-10-CM    1  Closed nondisplaced fracture of greater tuberosity of right humerus, initial encounter S42 254A    2  Closed fracture of rim of glenoid fossa of right scapula, initial encounter S42 141A                   Subjective: Patient states he is feeling better today  Objective: See treatment diary below      Assessment: Tolerated treatment well  Progressed patient with repetitions this visit, demonstrated fatigue following, but able to complete without increase in pain  Added weight into side lying exercises, noted more resistance  Provided cuing for active scaption due to incorrect posture  Patient continues to lack full ROM into active shoulder abduction, making slow progress for increase ROM  He does demonstrate improvement into PROM shoulder abduction  Patient demonstrated fatigue post treatment, exhibited good technique with therapeutic exercises and would benefit from continued PT      Plan: Continue per plan of care  Precautions: Pendulums and PROM R shoulder, Starts AAROM 19, and AROM 19, elbow and wrist ROM and  exercises as tolerated; modalities for 6 weeks    Per Sweetie Edmond PA-C begin strengthening 10 weeks after injury date ()        Manual  8-20 8-22 8-27 8/29 9/3 9/5 8/6 8/8  8-13 (RE)  8-15   PROM with OP into shoulder flex, scap, IR/ER at 45 degrees abd       JF SC        PROM with OP into shoulder flex, scap, IR/ER at 90 degrees abd JG JG JG    RA  RA      JG  JG                                                                                 Exercise Diary  8-20 8-22 8-27 8/29 9/3 9/5 8/6 8/8  8-13 (RE)  8-15   Pulleys Flexion and scap   2 min x 6" hold ea 2 min x 6" hold ea 2 min x 6" hold ea 2 min  X6"  Hold ea 2 min  X6"  Hold ea 2 min  X6"  Hold ea x2' ea X 2' ea    2' x 6" hold ea  2 min x 6" hold ea                Standing:             - wall ladder flex 10 x 10" 10 x 10" 10 x 10" 10x  10" 10x  10" 10x  10"    10 x 10"    - wall ladder scaption 10 x 10" 10 x 10" 10 x 10" 10x  10" 10x  10" 10x  10"    10 x 10"    - cane AAROM extension 10 x 10" NV 10 x 10" 10 x 10" 10x  10" 10x  10" 15x  10"       - cane AROM IR 10 x 10" NV 10 x 10" 10 x 10" 10x  10" 10x  10" 15x  10"                    Seated:                                         Supine AAROM cane flexion  15 x 6"  HEP only          6" 20x   10 x 6" hold  10 x 6" hold   Supine AAROM cane scaption  10 x 6"  HEP only          6" x 20   10 x 6" hold  4 x 6" hold    Supine AAROM ER               6" x 20   10 x 6" hold  5 x 6" hold                          Sidelying:                       S/L flexion  10 x  10 x  10 x  10x 10x  1#  10x   2x10   5 x  10 x   S/L ER  10 x  2 x 10  10 x  10x 10x  1#  10x   2x 10   5 x  10 x   S/L abd  10 x  10 x  10 x  10x 10x  1#  10x   2x10   5 x  10 x                                                 Standing:                      - ER stretch in doorway            HEP        - flexion   2 x 10 ea 2 x 10 ea  10 x  10x 1#  10x  1#  15x      10 x ea NV  10 x ea   - scaption  10 x ea  2 x 10 ea  10 x  10x 1#  10x  1#  15x      10 x ea NV  10 x ea    - abduction  10 x ea  2 x 10 ea  10 x  10x 1#  10x  1#  15x      10 x ea NV  10 x ea    - sleeper stretch at wall 4 x 30" NV  4 x 30"                   Prone:                        - rows  10 x  10 x   10 x  10x 1#  10x  1#  15x  1#      10 x ea NV  10 x    - extension 10 x 10 x 10 x 10x  1# 10x  1# 15x  1#   10 x ea NV  10 x    - horizontal abduction 10 x 10 x 10 x 10x  1# 10x  1# 15x  1#   10 x ea NV 10 x                                   Modalities  7-16 (IE) 7/18 7/25 7/30 8/8 8-27  9/3      Cryo           declined      10'  def

## 2019-09-10 ENCOUNTER — OFFICE VISIT (OUTPATIENT)
Dept: PHYSICAL THERAPY | Facility: CLINIC | Age: 74
End: 2019-09-10
Payer: MEDICARE

## 2019-09-10 DIAGNOSIS — S42.141A CLOSED FRACTURE OF RIM OF GLENOID FOSSA OF RIGHT SCAPULA, INITIAL ENCOUNTER: ICD-10-CM

## 2019-09-10 DIAGNOSIS — S42.254A CLOSED NONDISPLACED FRACTURE OF GREATER TUBEROSITY OF RIGHT HUMERUS, INITIAL ENCOUNTER: Primary | ICD-10-CM

## 2019-09-10 PROCEDURE — 97110 THERAPEUTIC EXERCISES: CPT

## 2019-09-10 PROCEDURE — 97140 MANUAL THERAPY 1/> REGIONS: CPT

## 2019-09-10 NOTE — PROGRESS NOTES
Daily Note     Today's date: 9/10/2019  Patient name: Jovi Ramos  : 1945  MRN: [id number]  Referring provider: Carlos Sorto MD  Dx:   Encounter Diagnosis     ICD-10-CM    1  Closed nondisplaced fracture of greater tuberosity of right humerus, initial encounter S42 254A    2  Closed fracture of rim of glenoid fossa of right scapula, initial encounter S42 141A            Start Time: 2:00pm  Stop: 2:50pm       Subjective: Patient states he is feeling pain into shoulder today due to playing with a dog  States he was trying to get a ball out of the dogs mouth when the dog pulled his arm to get the ball back  Reports feeling radiating pain into right forearm which is bothering him more than his shoulder  Objective: See treatment diary below      Assessment: Tolerated treatment fair  Patient was limited this session due to increase pain from the weekend  Completed standing exercises without weight to tolerance, only able to complete 10 reps  He was able to tolerate weight in sidelying and prone  [place] most pain into ER  Patient had no complaints of pain into PROM  Suggested to ice post session, he did report relief following therapy today, but still had radiating pain into right forearm  Patient demonstrated fatigue post treatment and would benefit from continued PT      Plan: Continue per plan of care  Precautions: Pendulums and PROM R shoulder, Starts AAROM 19, and AROM 19, elbow and wrist ROM and  exercises as tolerated; modalities for 6 weeks    Per Kevin Farley PA-C begin strengthening 10 weeks after injury date ()        Manual  8-20 8-22 8-27 8/29 9/3 9/5 9/10 8/8  8- (RE)  8-15   PROM with OP into shoulder flex, scap, IR/ER at 45 degrees abd        SC        PROM with OP into shoulder flex, scap, IR/ER at 90 degrees abd JG JG JG    RA  RA  RA    NIMESHG  JG                                                                                 Exercise Diary   8/29 9/3 9/5 9/10 8/8  8-13 (RE)  8-15   Pulleys Flexion and scap   2 min x 6" hold ea 2 min x 6" hold ea 2 min x 6" hold ea 2 min  X6"  Hold ea 2 min  X6"  Hold ea 2 min  X6"  Hold ea 2 min  X6"  Hold ea X 2' ea    2' x 6" hold ea  2 min x 6" hold ea                Standing:             - wall ladder flex 10 x 10" 10 x 10" 10 x 10" 10x  10" 10x  10" 10x  10" 10x  10"   10 x 10"    - wall ladder scaption 10 x 10" 10 x 10" 10 x 10" 10x  10" 10x  10" 10x  10" 10x  10"   10 x 10"    - cane AAROM extension 10 x 10" NV 10 x 10" 10 x 10" 10x  10" 10x  10" 15x  10" 15x  10"      - cane AROM IR 10 x 10" NV 10 x 10" 10 x 10" 10x  10" 10x  10" 15x  10" 15x  10"                   Seated:                                         Supine AAROM cane flexion  15 x 6"  HEP only          6" 20x   10 x 6" hold  10 x 6" hold   Supine AAROM cane scaption  10 x 6"  HEP only          6" x 20   10 x 6" hold  4 x 6" hold    Supine AAROM ER               6" x 20   10 x 6" hold  5 x 6" hold                          Sidelying:                       S/L flexion  10 x  10 x  10 x  10x 10x  1#  10x  15x  1# 2x10   5 x  10 x   S/L ER  10 x  2 x 10  10 x  10x 10x  1#  10x  1#  15x 2x 10   5 x  10 x   S/L abd  10 x  10 x  10 x  10x 10x  1#  10x  1#  15x 2x10   5 x  10 x                                                 Standing:                      - ER stretch in doorway            HEP        - flexion   2 x 10 ea 2 x 10 ea  10 x  10x 1#  10x  1#  15x  no weight  x10    10 x ea NV  10 x ea   - scaption  10 x ea  2 x 10 ea  10 x  10x 1#  10x  1#  15x  no weight  x10    10 x ea NV  10 x ea    - abduction  10 x ea  2 x 10 ea  10 x  10x 1#  10x  1#  15x  no weight  x10    10 x ea NV  10 x ea    - sleeper stretch at wall 4 x 30" NV  4 x 30"                   Prone:                        - rows  10 x  10 x   10 x  10x 1#  10x  1#  15x  1#  15x  1#    10 x ea NV  10 x    - extension 10 x 10 x 10 x 10x  1# 10x  1# 15x  1# 15x  1#  10 x ea NV  10 x    - horizontal abduction 10 x 10 x 10 x 10x  1# 10x  1# 15x  1# 15x  1#  10 x ea NV 10 x                                   Modalities  7-16 (IE) 7/18   7/25 7/30 8/8    8-27  9/3  9/10    Cryo           declined      10'  def  10'

## 2019-09-12 ENCOUNTER — EVALUATION (OUTPATIENT)
Dept: PHYSICAL THERAPY | Facility: CLINIC | Age: 74
End: 2019-09-12
Payer: MEDICARE

## 2019-09-12 DIAGNOSIS — S42.141A CLOSED FRACTURE OF RIM OF GLENOID FOSSA OF RIGHT SCAPULA, INITIAL ENCOUNTER: ICD-10-CM

## 2019-09-12 DIAGNOSIS — S42.254A CLOSED NONDISPLACED FRACTURE OF GREATER TUBEROSITY OF RIGHT HUMERUS, INITIAL ENCOUNTER: Primary | ICD-10-CM

## 2019-09-12 PROCEDURE — 97140 MANUAL THERAPY 1/> REGIONS: CPT | Performed by: PHYSICAL THERAPIST

## 2019-09-12 PROCEDURE — 97110 THERAPEUTIC EXERCISES: CPT | Performed by: PHYSICAL THERAPIST

## 2019-09-12 NOTE — PROGRESS NOTES
PT Re-Evaluation     Today's date: 2019  Patient name: Helen Brice  : 1945  MRN: [id number]  Referring provider: Gildardo Dubose MD  Dx:   Encounter Diagnosis     ICD-10-CM    1  Closed nondisplaced fracture of greater tuberosity of right humerus, initial encounter S42 254A    2  Closed fracture of rim of glenoid fossa of right scapula, initial encounter S42 141A                   Assessment  Assessment details: Helen Brice  is a pleasant 66 y o  presenting to physical therapy with MD referral for Closed nondisplaced fracture of greater tuberosity of right humerus, initial encounter, and Closed fracture of rim of glenoid fossa of right scapula, initial encounter  Since time of previous re- evaluation, pt has made only small improvements in shoulder A/PROM as well as upper extremity strength  Six days ago, pt was playing with a dog with a frisbee and the dog pulled hard on the frisbee while pt was holding it and pt experienced significant increase in shoulder and forearm pain  This recent incident may be the cause of lack of objective improvement this re-evaluation  Forearm, wrist and elbow screens with ROM WFL, tenderness upon palpation of radial head and reproduction of forearm pain with wrist flexion and extension stretching  Educated pt on self wrist flexion and extension stretches  Problem list: Poor posture, decreased shoulder A/AA/PROM, and decreased upper extremity strength  Treatment to include: Manual therapy techniques, shoulder A/AA/PROM, RTC/periscapular strengthening when permitted by MD, instruction in a comprehensive HEP, and modalities as needed  This pt would benefit from skilled PT services to address their impairments and functional limitations to maximize functional outcome  Symptom irritability: lowBarriers to therapy: age  Understanding of Dx/Px/POC: good   Prognosis: good    Goals  ST   Pt will improve shoulder flexion PROM to at least 155 degrees in 2 weeks  PARTIALLY MET  2  Pt will improve shoulder scaption PROM to at least 155 degrees in 2 weeks  PARTIALLY MET    LT  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 with minimal to no pain in  4 weeks  NOT MET  2  Pt will be independent in a comprehensive HEP in 4 weeks  PARTIALLY MET    Plan  Plan details: 2 x per week for 2-4 weeks  Patient would benefit from: skilled physical therapy  Frequency: 2x week  Duration in weeks: 4  Treatment plan discussed with: patient        Subjective Evaluation    History of Present Illness  Date of onset: 2019  Mechanism of injury: trauma  Mechanism of injury: Pt reports he was using a push mower over his septic mound and the mower caused him to loose his balance and fall into a tree backwards  Pt states he was unable to move his right arm and was in extreme pain  Pt states his neighbor helped pt get stable and his wife drove him to Urgent Care where x-rays were taken revealing anterior shoulder dislocation  Pt was taken to the hospital via his wife where he was taken back into a room where they reset his shoulder  Pt states he followed-up with ortho who educated pt to start PROM only  Pt denies any numbness or tingling in his arm or any cold sensation in arm       Premorbid status:  - ADLs: Independent with no difficulty  - Work: Not a working individual- retired  - Recreation: walking for exercise    Current status:  - ADLs/Functional activities:     - Reaching into shoulder height cabinets with Pain Levels: no, no difficulty   - Reaching into overhead cabinets with Pain Levels: minimal pain, no difficulty   - Washing lower back/tucking in shirt with Pain Levels: no pain/difficulty   - Washing hair with usual technique, no pain   - Driving with both arms with no pain   - Lifting a gallon of milk with no pain   - Carrying gallon of milk with no pain   - Sleeping with 0 nightly sleep disturbances due to pain  - Work: Not a working individual- retired  - Recreation: none    Since time of previous re-evaluation, functionally pt states he is back to 85% of his premorbid status  Functionally, pt states he continues to experience weakness in his shoulder causing inability to start the lawnmower, hesitate to play with a dog, and experiencing difficulty using computer mouse with right arm due to fatigue  Pt reports he has been experiencing pain in his forearm and his wrist that has been limiting his abilities  Although improvements have been made, this pt would continue to benefit from skilled PT services to maximize functional outcome  Pain  Current pain ratin  At best pain ratin  At worst pain rating: 3  Location: Lateral aspect of shoulder  Quality: discomfort  Relieving factors: medications and ice  Aggravating factors: overhead activity  Progression: improved      Diagnostic Tests  X-ray: abnormal  Treatments  Treatments tried: reset shoulder in ER    Current treatment: medication and physical therapy  Patient Goals  Patient goals for therapy: increased motion, decreased pain, increased strength and independence with ADLs/IADLs          Objective     Postural Observations  Seated posture: fair  Standing posture: fair    Additional Postural Observation Details  Moderate forward head and forward shoulder posture    Observations     Additional Observation Details  No bruising present on RUE    Palpation     Additional Palpation Details  Increased TTP over corocoid process on RUE     Active Range of Motion   Left Shoulder   Flexion: 155 degrees   Abduction: 145 degrees   External rotation BTH: T5   Internal rotation BTB: T7     Right Shoulder   Flexion: 134 degrees   Abduction: 134 degrees   External rotation BTH: T1   Internal rotation BTB: L1     Passive Range of Motion     Right Shoulder   Flexion: 157 degrees with pain  Abduction: 140 degrees with pain  External rotation 45°: 70 degrees with pain  External rotation 90°: 70 degrees   Internal rotation 45°: 55 degrees   Internal rotation 90°: 42 degrees     Strength/Myotome Testing     Left Shoulder     Planes of Motion   Flexion: 5   Abduction: 5   External rotation at 0°: 5     Right Shoulder     Planes of Motion   Flexion: 4   Abduction: 4   External rotation at 0°: 4   Internal rotation at 0°: 4+     Isolated Muscles   Biceps: 5   Triceps: 5     Additional Strength Details  Only strength tested to a 4/5 due to fracture presence            Precautions: Pendulums and PROM R shoulder, Starts AAROM 7/22/19, and AROM 8/6/19, elbow and wrist ROM and  exercises as tolerated; modalities for 6 weeks   Per Sheridan Livingston PA-C begin strengthening 10 weeks after injury date (Sept 1st)        Manual  8-20 8-22 8-27 8/29 9/3 9/5 9/10 9-12     PROM with OP into shoulder flex, scap, IR/ER at 45 degrees abd                     PROM with OP into shoulder flex, scap, IR/ER at 90 degrees abd JG JG JG    RA  RA  RA  JG                                                                                   Exercise Diary  8-20 8-22 8-27 8/29 9/3 9/5 9/10 9-12 (RE)     Pulleys Flexion and scap   2 min x 6" hold ea 2 min x 6" hold ea 2 min x 6" hold ea 2 min  X6"  Hold ea 2 min  X6"  Hold ea 2 min  X6"  Hold ea 2 min  X6"  Hold ea 2 min  X6"  Hold ea                          Standing:                    - wall ladder flex 10 x 10" 10 x 10" 10 x 10" 10x  10" 10x  10" 10x  10" 10x  10" 10x  10" DC    - wall ladder scaption 10 x 10" 10 x 10" 10 x 10" 10x  10" 10x  10" 10x  10" 10x  10" 10x  10" DC    - cane AAROM extension 10 x 10" NV 10 x 10" 10 x 10" 10x  10" 10x  10" 15x  10" 15x  10" HEP only     - cane AROM IR 10 x 10" NV 10 x 10" 10 x 10" 10x  10" 10x  10" 15x  10" 15x  10" HEP only                          Seated:                                         Supine AAROM cane flexion  15 x 6"  HEP only           20 x 1#     Supine AAROM cane scaption  10 x 6"  HEP only           15 x 1#     Supine AAROM ER                                     Sidelying:                     S/L flexion  10 x  10 x  10 x  10x 10x  1#  10x  15x  1#  15 x 1#    S/L ER  10 x  2 x 10  10 x  10x 10x  1#  10x  1#  15x  15 x 1#    S/L abd  10 x  10 x  10 x  10x 10x  1#  10x  1#  15x  15 x 1#                                              Standing:                    - ER stretch in doorway                     - flexion   2 x 10 ea 2 x 10 ea  10 x  10x 1#  10x  1#  15x  no weight  x10  NV    - scaption  10 x ea  2 x 10 ea  10 x  10x 1#  10x  1#  15x  no weight  x10  NV     - abduction  10 x ea  2 x 10 ea  10 x  10x 1#  10x  1#  15x  no weight  x10  NV     - sleeper stretch at wall 4 x 30" NV  4 x 30"                 Prone:                     - rows  10 x  10 x   10 x  10x 1#  10x  1#  15x  1#  15x  1#  NV    - extension 10 x 10 x 10 x 10x  1# 10x  1# 15x  1# 15x  1#  NV    - horizontal abduction 10 x 10 x 10 x 10x  1# 10x  1# 15x  1# 15x  1#  NV                          TB:                       - rows         2 x 10 YTB NV    - pulldowns         2 x 10 YTB NV    - ER         2 x 10 YTB NV    - IR         2 x 10 YTB NV    - AAROM flexion         2 x 10 YTB NV    - AAROM scaption         2 x 10 YTB NV                       Modalities  7-16 (IE) 7/18   7/25 7/30 8/8    8-27  9/3  9/10    Cryo           declined      10'  def  10'

## 2019-09-17 ENCOUNTER — OFFICE VISIT (OUTPATIENT)
Dept: PHYSICAL THERAPY | Facility: CLINIC | Age: 74
End: 2019-09-17
Payer: MEDICARE

## 2019-09-17 DIAGNOSIS — S42.141A CLOSED FRACTURE OF RIM OF GLENOID FOSSA OF RIGHT SCAPULA, INITIAL ENCOUNTER: ICD-10-CM

## 2019-09-17 DIAGNOSIS — S42.254A CLOSED NONDISPLACED FRACTURE OF GREATER TUBEROSITY OF RIGHT HUMERUS, INITIAL ENCOUNTER: Primary | ICD-10-CM

## 2019-09-17 PROCEDURE — 97140 MANUAL THERAPY 1/> REGIONS: CPT

## 2019-09-17 PROCEDURE — 97110 THERAPEUTIC EXERCISES: CPT

## 2019-09-17 NOTE — PROGRESS NOTES
Daily Note     Today's date: 2019  Patient name: Hillary Lemons  : 1945  MRN: [id number]  Referring provider: Malu Rogers MD  Dx:   Encounter Diagnosis     ICD-10-CM    1  Closed nondisplaced fracture of greater tuberosity of right humerus, initial encounter S42 254A    2  Closed fracture of rim of glenoid fossa of right scapula, initial encounter S42 141A                   Subjective: Patient states he continues to notice improvements  He has had decrease in pain since the dog incidence  States he has a follow up appointment with his doctor tomorrow  and is looking to be finished with physical therapy on Thursday  Objective: See treatment diary below      Assessment: Tolerated treatment well  Initiated theraband exercises this visit with good tolerance and no increase in sx's  He did require frequent postural cues for correct technique  Progressed patient in repetitions, was able to add back in weight to active ROM  Challenged with active shoulder abduction, lacking full ROM  Presented with muscle guarding into IR/ ER with PROM  Patient demonstrated fatigue post treatment, exhibited good technique with therapeutic exercises and would benefit from continued PT      Plan: Continue per plan of care  Progress treatment as tolerated         Precautions: Pendulums and PROM R shoulder, Starts AAROM 19, and AROM 19, elbow and wrist ROM and  exercises as tolerated; modalities for 6 weeks   Per Patricia Tejeda PA-C begin strengthening 10 weeks after injury date ()        Manual  8-20 8-22 8-27 8/29 9/3 9/5 9/10 9-12 9/17    PROM with OP into shoulder flex, scap, IR/ER at 45 degrees abd                     PROM with OP into shoulder flex, scap, IR/ER at 90 degrees abd Stacey BEAVERSG    RA  RA  RA  JG                                                                                   Exercise Diary  8-20 8-22 8-27 8/29 9/3 9/5 9/10 9-12 (RE)     Pulleys Flexion and scap   2 min x 6" hold ea 2 min x 6" hold ea 2 min x 6" hold ea 2 min  X6"  Hold ea 2 min  X6"  Hold ea 2 min  X6"  Hold ea 2 min  X6"  Hold ea 2 min  X6"  Hold ea 2 min  X6"  Hold ea                         Standing:                    - wall ladder flex 10 x 10" 10 x 10" 10 x 10" 10x  10" 10x  10" 10x  10" 10x  10" 10x  10" DC    - wall ladder scaption 10 x 10" 10 x 10" 10 x 10" 10x  10" 10x  10" 10x  10" 10x  10" 10x  10" DC    - cane AAROM extension 10 x 10" NV 10 x 10" 10 x 10" 10x  10" 10x  10" 15x  10" 15x  10" HEP only HEP    - cane AROM IR 10 x 10" NV 10 x 10" 10 x 10" 10x  10" 10x  10" 15x  10" 15x  10" HEP only HEP                                                                  Supine AAROM cane flexion  15 x 6"  HEP only           20 x 1# 20x  1#    Supine AAROM cane scaption  10 x 6"  HEP only           15 x 1#     Supine AAROM ER                                          Sidelying:                     S/L flexion  10 x  10 x  10 x  10x 10x  1#  10x  15x  1#  15x  1#    S/L ER  10 x  2 x 10  10 x  10x 10x  1#  10x  1#  15x  1#  15x    S/L abd  10 x  10 x  10 x  10x 10x  1#  10x  1#  15x  1#  20x                                              Standing:                    - ER stretch in doorway                     - flexion   2 x 10 ea 2 x 10 ea  10 x  10x 1#  10x  1#  15x  no weight  x10  15x  1#    - scaption  10 x ea  2 x 10 ea  10 x  10x 1#  10x  1#  15x  no weight  x10  15x  1#     - abduction  10 x ea  2 x 10 ea  10 x  10x 1#  10x  1#  15x  no weight  x10  15x  1#     - sleeper stretch at wall 4 x 30" NV  4 x 30"                 Prone:                     - rows  10 x  10 x   10 x  10x 1#  10x  1#  15x  1#  15x  1#  20x  1#    - extension 10 x 10 x 10 x 10x  1# 10x  1# 15x  1# 15x  1#  20x  1#    - horizontal abduction 10 x 10 x 10 x 10x  1# 10x  1# 15x  1# 15x  1#  20x  1#                          TB:                       - rows         2 x 10 YTB     - pulldowns         2 x 10 YTB     - ER         2 x 10 YTB - IR         2 x 10 YTB    - AAROM flexion         2 x 10 YTB NV    - AAROM scaption         2 x 10 YTB NV                       Modalities  7-16 (IE) 7/18   7/25 7/30 8/8    8-27  9/3  9/10    Cryo           declined      10'  def  10'

## 2019-09-18 ENCOUNTER — OFFICE VISIT (OUTPATIENT)
Dept: OBGYN CLINIC | Facility: CLINIC | Age: 74
End: 2019-09-18

## 2019-09-18 ENCOUNTER — APPOINTMENT (OUTPATIENT)
Dept: RADIOLOGY | Facility: AMBULARY SURGERY CENTER | Age: 74
End: 2019-09-18
Attending: ORTHOPAEDIC SURGERY
Payer: MEDICARE

## 2019-09-18 VITALS
SYSTOLIC BLOOD PRESSURE: 138 MMHG | WEIGHT: 199 LBS | HEART RATE: 86 BPM | DIASTOLIC BLOOD PRESSURE: 76 MMHG | HEIGHT: 67 IN | BODY MASS INDEX: 31.23 KG/M2

## 2019-09-18 DIAGNOSIS — S42.141D CLOSED FRACTURE OF RIM OF GLENOID FOSSA OF RIGHT SCAPULA WITH ROUTINE HEALING, SUBSEQUENT ENCOUNTER: ICD-10-CM

## 2019-09-18 DIAGNOSIS — S42.254D CLOSED NONDISPLACED FRACTURE OF GREATER TUBEROSITY OF RIGHT HUMERUS WITH ROUTINE HEALING, SUBSEQUENT ENCOUNTER: ICD-10-CM

## 2019-09-18 DIAGNOSIS — S42.254D CLOSED NONDISPLACED FRACTURE OF GREATER TUBEROSITY OF RIGHT HUMERUS WITH ROUTINE HEALING, SUBSEQUENT ENCOUNTER: Primary | ICD-10-CM

## 2019-09-18 PROCEDURE — 73030 X-RAY EXAM OF SHOULDER: CPT

## 2019-09-18 PROCEDURE — 99024 POSTOP FOLLOW-UP VISIT: CPT | Performed by: ORTHOPAEDIC SURGERY

## 2019-09-18 NOTE — PROGRESS NOTES
Patient Name:  Mirela Greene  MRN:  [id number]    Assessment     1  Closed nondisplaced fracture of greater tuberosity of right humerus with routine healing, subsequent encounter  XR shoulder 2+ vw right   2  Closed fracture of rim of glenoid fossa of right scapula with routine healing, subsequent encounter  XR shoulder 2+ vw right       Plan     Right greater tuberosity fracture and small anteroinferior glenoid rim fracture (bony Bankart) secondary to right shoulder dislocation sustained on 06/23/2019  1  Continue physical therapy, HEP as appropriate  2  Gradually return to normal activity as tolerated  3  Follow-up in two months for repeat evaluation  No x-rays needed unless clinically indicated  Subjective     57-year-old male returns to the office today for follow-up regarding his Right greater tuberosity fracture and small anteroinferior glenoid rim fracture (bony Bankart) secondary to right shoulder dislocation sustained on 06/23/2019  Today he notes continued improvement  He still notes mild discomfort in the right shoulder specifically with abduction maneuvers  He is participating in physical therapy and notes overall improvement with regards to strength and range of motion  He denies numbness and tingling  No fevers or chills  Objective     /76   Pulse 86   Ht 5' 7" (1 702 m)   Wt 90 3 kg (199 lb)   BMI 31 17 kg/m²     Right shoulder:  No gross deformity  No tenderness to palpation  Passive range of motion includes 140° forward flexion, 70° of external rotation-abduction, 50° of internal rotation-abduction  Negative Head impingement test   Negative empty can test   Mildly positive speed's test   Negative cross-body adduction test   Sensation intact axillary, median, ulnar and radial nerves  2+ radial pulse  Data Review     I have personally reviewed pertinent films in PACS, and my interpretation follows      X-rays performed today of the right shoulder reveals healed greater tuberosity fracture        Scribe Attestation    I,:   Tonya Meehan PA-C am acting as a scribe while in the presence of the attending physician :        I,:   Martita Huffman MD personally performed the services described in this documentation    as scribed in my presence :

## 2019-09-19 ENCOUNTER — OFFICE VISIT (OUTPATIENT)
Dept: PHYSICAL THERAPY | Facility: CLINIC | Age: 74
End: 2019-09-19
Payer: MEDICARE

## 2019-09-19 DIAGNOSIS — S42.254A CLOSED NONDISPLACED FRACTURE OF GREATER TUBEROSITY OF RIGHT HUMERUS, INITIAL ENCOUNTER: Primary | ICD-10-CM

## 2019-09-19 DIAGNOSIS — S42.141A CLOSED FRACTURE OF RIM OF GLENOID FOSSA OF RIGHT SCAPULA, INITIAL ENCOUNTER: ICD-10-CM

## 2019-09-19 PROCEDURE — 97110 THERAPEUTIC EXERCISES: CPT | Performed by: PHYSICAL THERAPIST

## 2019-09-19 PROCEDURE — 97140 MANUAL THERAPY 1/> REGIONS: CPT | Performed by: PHYSICAL THERAPIST

## 2019-09-19 NOTE — PROGRESS NOTES
Daily Note     Today's date: 2019  Patient name: George Monique  : 1945  MRN: [id number]  Referring provider: Jacki Hayes MD  Dx:   Encounter Diagnosis     ICD-10-CM    1  Closed nondisplaced fracture of greater tuberosity of right humerus, initial encounter S42 254A    2  Closed fracture of rim of glenoid fossa of right scapula, initial encounter S42 141A                   Subjective: Patient reports he had his MD appointment yesterday and they were pleased with his progress  Pt states he pull started his lawnmower a few times yesterday and felt increased pain for the rest of the night and into today  MD would like pt to continue with PT a little longer  Objective: See treatment diary below      Assessment: Progressed exercises this session to include AAROM flexion and scaption with TB this date  No other progressions performed this date due to increased soreness at onset of session  Pt requiredTolerated treatment well  Patient demonstrated fatigue post treatment, exhibited good technique with therapeutic exercises and would benefit from continued PT  Plan: Progress treatment as tolerated         Precautions: Pendulums and PROM R shoulder, Starts AAROM 19, and AROM 19, elbow and wrist ROM and  exercises as tolerated; modalities for 6 weeks   Per Francois Renee PA-C begin strengthening 10 weeks after injury date ()        Manual  8-20 8-22 8-27 8/29 9/3 9/5 9/10 9-12 9/17 9-19   PROM with OP into shoulder flex, scap, IR/ER at 45 degrees abd                     PROM with OP into shoulder flex, scap, IR/ER at 90 degrees abd BONG WOODY                                                                                 Exercise Diary  8-20 8-22 8-27 8/29 9/3 9/5 9/10 9-12 (RE)    Pulleys Flexion and scap   2 min x 6" hold ea 2 min x 6" hold ea 2 min x 6" hold ea 2 min  X6"  Hold ea 2 min  X6"  Hold ea 2 min  X6"  Hold ea 2 min  X6"  Hold ea 2 min  X6"  Hold ea 2 min  X6"  Hold ea 2 min  X6"  Hold ea                        Standing:                    - wall ladder flex 10 x 10" 10 x 10" 10 x 10" 10x  10" 10x  10" 10x  10" 10x  10" 10x  10" DC    - wall ladder scaption 10 x 10" 10 x 10" 10 x 10" 10x  10" 10x  10" 10x  10" 10x  10" 10x  10" DC    - cane AAROM extension 10 x 10" NV 10 x 10" 10 x 10" 10x  10" 10x  10" 15x  10" 15x  10" HEP only HEP    - cane AROM IR 10 x 10" NV 10 x 10" 10 x 10" 10x  10" 10x  10" 15x  10" 15x  10" HEP only HEP                                                                  Supine AAROM cane flexion  15 x 6"  HEP only           20 x 1# 20x  1#    Supine AAROM cane scaption  10 x 6"  HEP only           15 x 1#     Supine AAROM ER                                          Sidelying:                     S/L flexion  10 x  10 x  10 x  10x 10x  1#  10x  15x  1#  15x  1# 20 x 1#   S/L ER  10 x  2 x 10  10 x  10x 10x  1#  10x  1#  15x  1#  15x 20 x 1#   S/L abd  10 x  10 x  10 x  10x 10x  1#  10x  1#  15x  1#  20x 20 x 1#                                             Standing:                    - ER stretch in doorway                     - flexion   2 x 10 ea 2 x 10 ea  10 x  10x 1#  10x  1#  15x  no weight  x10  15x  1# 20 x 1#   - scaption  10 x ea  2 x 10 ea  10 x  10x 1#  10x  1#  15x  no weight  x10  15x  1# 20 x 1#    - abduction  10 x ea  2 x 10 ea  10 x  10x 1#  10x  1#  15x  no weight  x10  15x  1# 20 x 1#    - sleeper stretch at wall 4 x 30" NV  4 x 30"                              Prone:                     - rows  10 x  10 x   10 x  10x 1#  10x  1#  15x  1#  15x  1#  20x  1# 20 x 1#   - extension 10 x 10 x 10 x 10x  1# 10x  1# 15x  1# 15x  1#  20x  1# 20 x 1#   - horizontal abduction 10 x 10 x 10 x 10x  1# 10x  1# 15x  1# 15x  1#  20x  1# 20 x 1#                         TB:                       - rows         2 x 10 YTB  2 x 10 YTB   - pulldowns         2 x 10 YTB  2 x 10 YTB   - ER         2 x 10 YTB  2 x 10 YTB   - IR 2 x 10 YTB 2 x 10 YTB   - AAROM flexion         2 x 10 YTB NV 10 x YTB   - AAROM scaption         2 x 10 YTB NV 10 x YTB                      Modalities  7-16 (IE) 7/18   7/25 7/30 8/8    8-27  9/3  9/10    Cryo           declined      10'  def  10'

## 2019-09-24 ENCOUNTER — OFFICE VISIT (OUTPATIENT)
Dept: PHYSICAL THERAPY | Facility: CLINIC | Age: 74
End: 2019-09-24
Payer: MEDICARE

## 2019-09-24 DIAGNOSIS — S42.141A CLOSED FRACTURE OF RIM OF GLENOID FOSSA OF RIGHT SCAPULA, INITIAL ENCOUNTER: ICD-10-CM

## 2019-09-24 DIAGNOSIS — S42.254A CLOSED NONDISPLACED FRACTURE OF GREATER TUBEROSITY OF RIGHT HUMERUS, INITIAL ENCOUNTER: Primary | ICD-10-CM

## 2019-09-24 PROCEDURE — 97110 THERAPEUTIC EXERCISES: CPT

## 2019-09-24 PROCEDURE — 97140 MANUAL THERAPY 1/> REGIONS: CPT

## 2019-09-24 NOTE — PROGRESS NOTES
Daily Note     Today's date: 2019  Patient name: Lorne Sweeney  : 1945  MRN: [id number]  Referring provider: Kortney Ceron MD  Dx:   Encounter Diagnosis     ICD-10-CM    1  Closed nondisplaced fracture of greater tuberosity of right humerus, initial encounter S42 254A    2  Closed fracture of rim of glenoid fossa of right scapula, initial encounter S42 141A                   Subjective: Patient states he is getting frustrated because he knows its going to take a bit for he's back to his normal self  Reports 1/10 pain arriving to therapy  Objective: See treatment diary below      Assessment: Tolerated treatment well  Continued with current POC due to patient feeling like it is still challenging  Continues to have discomfort into abduction with PROM  Deferred ice this visit, reporting he wants to give his shoulder a break  Patient demonstrated fatigue post treatment, exhibited good technique with therapeutic exercises and would benefit from continued PT      Plan: Continue per plan of care  Progress treatment as tolerated         Precautions: Pendulums and PROM R shoulder, Starts AAROM 19, and AROM 19, elbow and wrist ROM and  exercises as tolerated; modalities for 6 weeks   Per Tra Musa PA-C begin strengthening 10 weeks after injury date ()        Manual  9/24 8-22 8-27 8/29 9/3 9/5 9/10 9-12 9/17 9-19   PROM with OP into shoulder flex, scap, IR/ER at 45 degrees abd                     PROM with OP into shoulder flex, scap, IR/ER at 90 degrees abd RA BONG Pearson                                                                                 Exercise Diary  9/24 8-22 8-27 8/29 9/3 9/5 9/10 9-12 (RE)    Pulleys Flexion and scap   2 min x 6" hold ea 2 min x 6" hold ea 2 min x 6" hold ea 2 min  X6"  Hold ea 2 min  X6"  Hold ea 2 min  X6"  Hold ea 2 min  X6"  Hold ea 2 min  X6"  Hold ea 2 min  X6"  Hold ea 2 min  X6"  Hold ea                   Standing:                    - wall ladder flex DC DC 10 x 10" 10x  10" 10x  10" 10x  10" 10x  10" 10x  10" DC    D DC DC 10 x 10" 10x  10" 10x  10" 10x  10" 10x  10" 10x  10" DC    - cane AAROM extension HEP 10 x 10" 10 x 10" 10x  10" 10x  10" 15x  10" 15x  10" HEP only HEP    - cane AROM IR HEP 10 x 10" 10 x 10" 10x  10" 10x  10" 15x  10" 15x  10" HEP only HEP                                                                  Supine AAROM cane flexion   HEP only           20 x 1# 20x  1#    Supine AAROM cane scaption    HEP only           15 x 1#     Supine AAROM ER                                          Sidelying:                     S/L flexion  20x  1#  10 x  10 x  10x 10x  1#  10x  15x  1#  15x  1# 20 x 1#   S/L ER  20x1#  2 x 10  10 x  10x 10x  1#  10x  1#  15x  1#  15x 20 x 1#   S/L abd  20x  1#  10 x  10 x  10x 10x  1#  10x  1#  15x  1#  20x 20 x 1#                                             Standing:                    - ER stretch in doorway                     - flexion   20x  1# 2 x 10 ea  10 x  10x 1#  10x  1#  15x  no weight  x10  15x  1# 20 x 1#   - scaption  20x  1#  2 x 10 ea  10 x  10x 1#  10x  1#  15x  no weight  x10  15x  1# 20 x 1#    - abduction 20x  1#  2 x 10 ea  10 x  10x 1#  10x  1#  15x  no weight  x10  15x  1# 20 x 1#    - sleeper stretch at wall   4 x 30"                              Prone:                     - rows 20 1#  10 x   10 x  10x 1#  10x  1#  15x  1#  15x  1#  20x  1# 20 x 1#   - extension 20x 1# 10 x 10 x 10x  1# 10x  1# 15x  1# 15x  1#  20x  1# 20 x 1#   - horizontal abduction 20x# 10 x 10 x 10x  1# 10x  1# 15x  1# 15x  1#  20x  1# 20 x 1#                         TB:                       - rows 2x10  YTB        2 x 10 YTB  2 x 10 YTB   - pulldowns 2x10  YTB        2 x 10 YTB  2 x 10 YTB   - ER 2x10  YTB        2 x 10 YTB  2 x 10 YTB   - IR 2x10  YTB        2 x 10 YTB 2 x 10 YTB   - AAROM flexion 2x10  YTB        2 x 10 YTB NV 10 x YTB   - AAROM scaption 2x10  YTB 2 x 10 YTB NV 10 x YTB                      Modalities  7-16 (IE) 7/18 7/25 7/30 8/8 8-27  9/3  9/10    Cryo           declined      10'  def  10'

## 2019-09-26 ENCOUNTER — OFFICE VISIT (OUTPATIENT)
Dept: PHYSICAL THERAPY | Facility: CLINIC | Age: 74
End: 2019-09-26
Payer: MEDICARE

## 2019-09-26 DIAGNOSIS — S42.141A CLOSED FRACTURE OF RIM OF GLENOID FOSSA OF RIGHT SCAPULA, INITIAL ENCOUNTER: ICD-10-CM

## 2019-09-26 DIAGNOSIS — S42.254A CLOSED NONDISPLACED FRACTURE OF GREATER TUBEROSITY OF RIGHT HUMERUS, INITIAL ENCOUNTER: Primary | ICD-10-CM

## 2019-09-26 PROCEDURE — 97112 NEUROMUSCULAR REEDUCATION: CPT

## 2019-09-26 PROCEDURE — 97140 MANUAL THERAPY 1/> REGIONS: CPT

## 2019-09-26 PROCEDURE — 97110 THERAPEUTIC EXERCISES: CPT

## 2019-09-26 NOTE — PROGRESS NOTES
Daily Note     Today's date: 2019  Patient name: Manas Omer  : 1945  MRN: [id number]  Referring provider: Alek Bach MD  Dx:   Encounter Diagnosis     ICD-10-CM    1  Closed nondisplaced fracture of greater tuberosity of right humerus, initial encounter S42 254A    2  Closed fracture of rim of glenoid fossa of right scapula, initial encounter S42 141A        Start Time: 020  Stop Time: 245  Total time in clinic (min): 38 minutes    Subjective: Patient reported feeling the same old some old, /10 pain in R shoulder  Pt reported at this time he is scared to pull start his  due to fear of injuring his shoulder again and reported he is scared it is going to cause pain  Objective: See treatment diary below      Assessment:  Continued with treatment session, progressed level of challenge and resitance with exercises today, overall progressing well, post treatment session some Tolerated treatment well  Patient demonstrated fatigue post treatment, exhibited good technique with therapeutic exercises and would benefit from continued PT      Plan: Continue per plan of care        Precautions: Pendulums and PROM R shoulder, Starts AAROM 19, and AROM 19, elbow and wrist ROM and  exercises as tolerated; modalities for 6 weeks   Per Kaci Birch PA-C begin strengthening 10 weeks after injury date ()        Manual             PROM with OP into shoulder flex, scap, IR/ER at 45 degrees abd               PROM with OP into shoulder flex, scap, IR/ER at 90 degrees abd RA SC                                                                                Exercise Diary             Pulleys Flexion and scap   2 min x 6" hold ea 2 min x 6" hold ea                                           Supine AAROM cane flexion             Supine AAROM cane scaption              Supine AAROM ER                              Sidelying:               S/L flexion  20x  1# 20x 2# S/L ER  20x1# 20x 1#            S/L abd  20x  1# 20x 2#                                          Standing:              - ER stretch in doorway               - flexion    20x  1# 2# 20x            - scaption  20x  1# 2# 20x             - abduction 20x  1# 20x 1#             - sleeper stretch at wall                           Prone:               - rows 20 1# 20x 1#            - extension 20x 1# 20x 1#           - horizontal abduction 20x# 20x 1#                            TB:              - rows 2x10  YTB 3x 10 RTB            - pulldowns 2x10  YTB 3x 10 RTB           - ER 2x10  YTB 2x 10 RTB            - IR 2x10  YTB 2x10 RTB            - AAROM flexion 2x10  YTB 2x 10 RTB            - AAROM scaption 2x10  YTB np                              Modalities               Cryo

## 2019-10-01 ENCOUNTER — APPOINTMENT (OUTPATIENT)
Dept: PHYSICAL THERAPY | Facility: CLINIC | Age: 74
End: 2019-10-01
Payer: MEDICARE

## 2019-10-03 ENCOUNTER — OFFICE VISIT (OUTPATIENT)
Dept: PHYSICAL THERAPY | Facility: CLINIC | Age: 74
End: 2019-10-03
Payer: MEDICARE

## 2019-10-03 DIAGNOSIS — S42.254A CLOSED NONDISPLACED FRACTURE OF GREATER TUBEROSITY OF RIGHT HUMERUS, INITIAL ENCOUNTER: Primary | ICD-10-CM

## 2019-10-03 DIAGNOSIS — S42.141A CLOSED FRACTURE OF RIM OF GLENOID FOSSA OF RIGHT SCAPULA, INITIAL ENCOUNTER: ICD-10-CM

## 2019-10-03 PROCEDURE — 97140 MANUAL THERAPY 1/> REGIONS: CPT | Performed by: PHYSICAL THERAPIST

## 2019-10-03 PROCEDURE — 97110 THERAPEUTIC EXERCISES: CPT | Performed by: PHYSICAL THERAPIST

## 2019-10-03 NOTE — PROGRESS NOTES
Daily Note     Today's date: 10/3/2019  Patient name: Jed Faustin  : 1945  MRN: [id number]  Referring provider: Mateo Richey MD  Dx:   Encounter Diagnosis     ICD-10-CM    1  Closed nondisplaced fracture of greater tuberosity of right humerus, initial encounter S42 254A    2  Closed fracture of rim of glenoid fossa of right scapula, initial encounter S42 141A                   Subjective: Patient reports he tried lifting heavier weights at home (3# vs 1-2#) and felt soreness for 4 days  Pt states today he is feeling pretty good  Objective: See treatment diary below      Assessment: No exercise progressions performed this date due to increase in pain lifting heavier resistance at home  Improvement noted in shoulder flexion and ER ROM at onset of manual as compared to previous sessions  Pt was able to tolerate all exercises with fatigue, but no reports of increase in pain  Tolerated treatment well  Patient demonstrated fatigue post treatment and would benefit from continued PT      Plan: Progress treatment as tolerated  Precautions: Pendulums and PROM R shoulder, Starts AAROM 19, and AROM 19, elbow and wrist ROM and  exercises as tolerated; modalities for 6 weeks   Per Félix Blake PA-C begin strengthening 10 weeks after injury date ()        Manual   10-3          PROM with OP into shoulder flex, scap, IR/ER at 45 degrees abd               PROM with OP into shoulder flex, scap, IR/ER at 90 degrees abd RA SC JG                                                                               Exercise Diary   10-3          Pulleys Flexion and scap   2 min x 6" hold ea 2 min x 6" hold ea    2 min x 6" hold ea                                         Supine AAROM cane flexion             Supine AAROM cane scaption              Supine AAROM ER                              Sidelying:               S/L flexion  20x  1# 20x 2#  2# 20x          S/L ER  20x1# 20x 1#  2# 20x          S/L abd  20x  1# 20x 2#  2# 20x                                        Standing:              - ER stretch in doorway               - flexion    20x  1# 2# 20x  1# 20x          - scaption  20x  1# 2# 20x  1# 20x           - abduction 20x  1# 20x 1#  1# 20x           - sleeper stretch at wall                           Prone:               - rows 20 1# 20x 1#  20x 1#           - extension 20x 1# 20x 1# 20x 1#           - horizontal abduction 20x# 20x 1#  20x 1#                           TB:              - rows 2x10  YTB 3x 10 RTB  3x 10 RTB           - pulldowns 2x10  YTB 3x 10 RTB 3x 10 RTB           - ER 2x10  YTB 2x 10 RTB  2x 10 RTB          - IR 2x10  YTB 2x10 RTB  2x 10 RTB          - AAROM flexion 2x10  YTB 2x 10 RTB  2x 10 RTB          - AAROM scaption 2x10  YTB np                              Modalities               Cryo                                                         *1:1 time this date from 3:07pm- 3:40pm only

## 2019-10-08 ENCOUNTER — OFFICE VISIT (OUTPATIENT)
Dept: PHYSICAL THERAPY | Facility: CLINIC | Age: 74
End: 2019-10-08
Payer: MEDICARE

## 2019-10-08 DIAGNOSIS — S42.141A CLOSED FRACTURE OF RIM OF GLENOID FOSSA OF RIGHT SCAPULA, INITIAL ENCOUNTER: ICD-10-CM

## 2019-10-08 DIAGNOSIS — S42.254A CLOSED NONDISPLACED FRACTURE OF GREATER TUBEROSITY OF RIGHT HUMERUS, INITIAL ENCOUNTER: Primary | ICD-10-CM

## 2019-10-08 PROCEDURE — 97110 THERAPEUTIC EXERCISES: CPT | Performed by: PHYSICAL THERAPIST

## 2019-10-08 PROCEDURE — 97140 MANUAL THERAPY 1/> REGIONS: CPT | Performed by: PHYSICAL THERAPIST

## 2019-10-08 NOTE — PROGRESS NOTES
Daily Note     Today's date: 10/8/2019  Patient name: Dayana Klein  : 1945  MRN: [id number]  Referring provider: Ganesh Romano MD  Dx:   Encounter Diagnosis     ICD-10-CM    1  Closed nondisplaced fracture of greater tuberosity of right humerus, initial encounter S42 254A    2  Closed fracture of rim of glenoid fossa of right scapula, initial encounter S42 141A                   Subjective: Patient reports he had one good day out of the past 5 where he had no pain at all  Pt reports on the bad days it took him an extra 30 minutes to fall asleep  Pt reports he was able to mow his lawn and use the leaf blower with minimal difficulty this past weekend  Objective: See treatment diary below      Assessment: Progressed exercises this session in sets and resistance to enhance functional shoulder strength and endurance  Pt was able to tolerate  ll exercises this date with minimal increase in pain/discomfort  Tolerated treatment well  Patient demonstrated fatigue post treatment, exhibited good technique with therapeutic exercises and would benefit from continued PT      Plan: Potential discharge next visit  Precautions: Pendulums and PROM R shoulder, Starts AAROM 19, and AROM 19, elbow and wrist ROM and  exercises as tolerated; modalities for 6 weeks   Per Pallavi Harrell PA-C begin strengthening 10 weeks after injury date ()        Manual   10-3 10-8         PROM with OP into shoulder flex, scap, IR/ER at 45 degrees abd               PROM with OP into shoulder flex, scap, IR/ER at 90 degrees abd RA SC JG JG                                                                              Exercise Diary   10-3 10-8         Pulleys Flexion and scap   2 min x 6" hold ea 2 min x 6" hold ea  2 min x 6" hold ea   2 min x 6" hold ea                                        Supine AAROM cane flexion             Supine AAROM cane scaption              Supine AAROM ER                   Sidelying:               S/L flexion  20x  1# 20x 2#  2# 20x 3 x 10 2#         S/L ER  20x1# 20x 1#  2# 20x 3 x 10 2#         S/L abd  20x  1# 20x 2#  2# 20x 3 x 10 2#                                       Standing:              - ER stretch in doorway               - flexion    20x  1# 2# 20x  1# 20x 3 x 10 2#         - scaption  20x  1# 2# 20x  1# 20x 3 x 10 2#          - abduction 20x  1# 20x 1#  1# 20x 3 x 10 2#          - sleeper stretch at wall                           Prone:               - rows 20 1# 20x 1#  20x 1#  3 x 10 2#         - extension 20x 1# 20x 1# 20x 1#  3 x 10 2#         - horizontal abduction 20x# 20x 1#  20x 1#  3 x 10 2#                         TB:              - rows 2x10  YTB 3x 10 RTB  3x 10 RTB  3 x 10 RTB         - pulldowns 2x10  YTB 3x 10 RTB 3x 10 RTB  3 x 10 RTB         - ER 2x10  YTB 2x 10 RTB  2x 10 RTB 3 x 10 RTB         - IR 2x10  YTB 2x10 RTB  2x 10 RTB 3 x 10 RTB         - AAROM flexion 2x10  YTB 2x 10 RTB  2x 10 RTB 2 x 10 YTB         - AAROM scaption 2x10  YTB np  2 x 10 YTB                            Modalities               Cryo

## 2019-10-10 ENCOUNTER — OFFICE VISIT (OUTPATIENT)
Dept: PHYSICAL THERAPY | Facility: CLINIC | Age: 74
End: 2019-10-10
Payer: MEDICARE

## 2019-10-10 DIAGNOSIS — S42.254A CLOSED NONDISPLACED FRACTURE OF GREATER TUBEROSITY OF RIGHT HUMERUS, INITIAL ENCOUNTER: Primary | ICD-10-CM

## 2019-10-10 DIAGNOSIS — S42.141A CLOSED FRACTURE OF RIM OF GLENOID FOSSA OF RIGHT SCAPULA, INITIAL ENCOUNTER: ICD-10-CM

## 2019-10-10 PROCEDURE — 97110 THERAPEUTIC EXERCISES: CPT

## 2019-10-10 PROCEDURE — 97140 MANUAL THERAPY 1/> REGIONS: CPT

## 2019-10-10 NOTE — PROGRESS NOTES
Daily Note     Today's date: 10/10/2019  Patient name: Ron Morin  : 1945  MRN: [id number]  Referring provider: Paulie Vargas MD  Dx:   Encounter Diagnosis     ICD-10-CM    1  Closed nondisplaced fracture of greater tuberosity of right humerus, initial encounter S42 254A    2  Closed fracture of rim of glenoid fossa of right scapula, initial encounter S42 141A        Start Time: 1400  Stop Time: 1448  Total time in clinic (min): 48 minutes    Subjective: patient reported upon arrival he has the usual pain 1/10 in R shoulder, patient reported he does not know that he will ever be completely pain free  Objective: See treatment diary below      Assessment:  Continued with treatment session, patient educated today is his last day within in his POC and will be D/C after today  Pt performed all exercises with 1# wt today instead of 2 upon patient request due to a little more muscle sore today, patient completed AROM/ strengthening TB with GTB, AAROM still YTB  Pt educated with all information to continue all exercises at home with print out of HEP along with TB to correspond with exercises  Pt educated about some postural exercises as well to perform at home  Tolerated treatment well  Patient demonstrated fatigue post treatment, exhibited good technique with therapeutic exercises and would benefit from continued PT, no c/o increase in pain post treatment session  Plan: Pt D/C at this time by evaluating therapist and will continue with HEP and get a new script if needs to return in the future  FOTO completed along with feedback survey at the visit        Precautions: Pendulums and PROM R shoulder, Starts AAROM 19, and AROM 19, elbow and wrist ROM and  exercises as tolerated; modalities for 6 weeks   Per Jann Valladares PA-C begin strengthening 10 weeks after injury date ()        Manual   10-3 10-8 10/10        PROM with OP into shoulder flex, scap, IR/ER at 45 degrees abd               PROM with OP into shoulder flex, scap, IR/ER at 90 degrees abd RA SC JG JG SC                                                                             Exercise Diary  9/24 9/26 10-3 10-8 10/10        Pulleys Flexion and scap   2 min x 6" hold ea 2 min x 6" hold ea  2 min x 6" hold ea  2 min x 6" hold ea   2 min x 6"                                       Supine AAROM cane flexion             Supine AAROM cane scaption              Supine AAROM ER                              Sidelying:               S/L flexion  20x  1# 20x 2#  2# 20x 3 x 10 2# 3x 10 1#        S/L ER  20x1# 20x 1#  2# 20x 3 x 10 2# 3x 10 1#        S/L abd  20x  1# 20x 2#  2# 20x 3 x 10 2# 3x 10 1#                                      Standing:              - ER stretch in doorway               - flexion    20x  1# 2# 20x  1# 20x 3 x 10 2# 3x 10 1#        - scaption  20x  1# 2# 20x  1# 20x 3 x 10 2# 3x 10 1#         - abduction 20x  1# 20x 1#  1# 20x 3 x 10 2# 3x 10 1#         - sleeper stretch at wall                           Prone:               - rows 20 1# 20x 1#  20x 1#  3 x 10 2# 3x 10 1#        - extension 20x 1# 20x 1# 20x 1#  3 x 10 2# 3x 10 1#        - horizontal abduction 20x# 20x 1#  20x 1#  3 x 10 2# 3x 10 1#                        TB:              - rows 2x10  YTB 3x 10 RTB  3x 10 RTB  3 x 10 RTB 3x 10 GTB         - pulldowns 2x10  YTB 3x 10 RTB 3x 10 RTB  3 x 10 RTB 3x 10 GTB         - ER 2x10  YTB 2x 10 RTB  2x 10 RTB 3 x 10 RTB 3x 10 GTB         - IR 2x10  YTB 2x10 RTB  2x 10 RTB 3 x 10 RTB 3x 10 GTB         - AAROM flexion 2x10  YTB 2x 10 RTB  2x 10 RTB 2 x 10 YTB 2x 10 YTB         - AAROM scaption 2x10  YTB np  2 x 10 YTB 2x 10 YTB                            Modalities               Cryo

## 2019-10-15 ENCOUNTER — APPOINTMENT (OUTPATIENT)
Dept: PHYSICAL THERAPY | Facility: CLINIC | Age: 74
End: 2019-10-15
Payer: MEDICARE

## 2019-10-17 ENCOUNTER — APPOINTMENT (OUTPATIENT)
Dept: PHYSICAL THERAPY | Facility: CLINIC | Age: 74
End: 2019-10-17
Payer: MEDICARE

## 2019-10-22 ENCOUNTER — APPOINTMENT (OUTPATIENT)
Dept: PHYSICAL THERAPY | Facility: CLINIC | Age: 74
End: 2019-10-22
Payer: MEDICARE

## 2019-10-24 ENCOUNTER — APPOINTMENT (OUTPATIENT)
Dept: PHYSICAL THERAPY | Facility: CLINIC | Age: 74
End: 2019-10-24
Payer: MEDICARE

## 2019-11-19 ENCOUNTER — OFFICE VISIT (OUTPATIENT)
Dept: OBGYN CLINIC | Facility: CLINIC | Age: 74
End: 2019-11-19
Payer: MEDICARE

## 2019-11-19 VITALS
HEART RATE: 82 BPM | WEIGHT: 201 LBS | HEIGHT: 67 IN | BODY MASS INDEX: 31.55 KG/M2 | DIASTOLIC BLOOD PRESSURE: 75 MMHG | SYSTOLIC BLOOD PRESSURE: 142 MMHG

## 2019-11-19 DIAGNOSIS — S42.141D CLOSED FRACTURE OF RIM OF GLENOID FOSSA OF RIGHT SCAPULA WITH ROUTINE HEALING, SUBSEQUENT ENCOUNTER: Primary | ICD-10-CM

## 2019-11-19 DIAGNOSIS — S42.254D CLOSED NONDISPLACED FRACTURE OF GREATER TUBEROSITY OF RIGHT HUMERUS WITH ROUTINE HEALING, SUBSEQUENT ENCOUNTER: ICD-10-CM

## 2019-11-19 PROCEDURE — 99213 OFFICE O/P EST LOW 20 MIN: CPT | Performed by: ORTHOPAEDIC SURGERY

## 2019-11-19 NOTE — PROGRESS NOTES
Patient Name:  Clau Stevens  MRN:  [id number]    Assessment & Plan     Right greater tuberosity fracture and small anteroinferior glenoid rim fracture (bony Bankart) secondary to right shoulder dislocation sustained on 06/23/2019  1  Activities as tolerated, no restrictions  2  Continue home exercises  3  Follow-up as needed  Subjective     47-year-old male returns to the office today for follow-up regarding his right shoulder  He still notes persistent discomfort in the right shoulder localized to the anterior and lateral aspect  Discomfort is worse with lying on the right side at night  He completed formal physical therapy and has been doing home exercises intermittently  He notes persistent weakness which is improving  He denies instability  No numbness or tingling  No fevers or chills  General ROS:  Negative for fever or chills  Neurological ROS:  Negative for numbness or tingling  Objective     /75   Pulse 82   Ht 5' 7" (1 702 m)   Wt 91 2 kg (201 lb)   BMI 31 48 kg/m²     Right shoulder:  No gross deformity  No tenderness to palpation  Passive range of motion includes 130° of forward flexion, 70° of external rotation-abduction, 40° of internal rotation-abduction  Negative Head impingement test  Negative speed's test  Negative empty can test   Positive cross-body adduction test   Sensation intact axillary, median, ulnar and radial nerves  2+ radial pulse  Psychiatric: Mood and affect are appropriate        Social History     Tobacco Use    Smoking status: Never Smoker    Smokeless tobacco: Never Used   Substance Use Topics    Alcohol use: Yes     Comment: social    Drug use: Not on file       Scribe Attestation    I,:   Theresa Sam PA-C am acting as a scribe while in the presence of the attending physician :        I,:   Kaylynn Pradhan MD personally performed the services described in this documentation    as scribed in my presence :

## 2021-01-20 DIAGNOSIS — Z23 ENCOUNTER FOR IMMUNIZATION: ICD-10-CM

## 2021-01-24 ENCOUNTER — IMMUNIZATIONS (OUTPATIENT)
Dept: FAMILY MEDICINE CLINIC | Facility: HOSPITAL | Age: 76
End: 2021-01-24

## 2021-01-24 DIAGNOSIS — Z23 ENCOUNTER FOR IMMUNIZATION: Primary | ICD-10-CM

## 2021-01-24 PROCEDURE — 91301 SARS-COV-2 / COVID-19 MRNA VACCINE (MODERNA) 100 MCG: CPT

## 2021-01-24 PROCEDURE — 0011A SARS-COV-2 / COVID-19 MRNA VACCINE (MODERNA) 100 MCG: CPT

## 2021-02-20 ENCOUNTER — IMMUNIZATIONS (OUTPATIENT)
Dept: FAMILY MEDICINE CLINIC | Facility: HOSPITAL | Age: 76
End: 2021-02-20

## 2021-02-20 DIAGNOSIS — Z23 ENCOUNTER FOR IMMUNIZATION: Primary | ICD-10-CM

## 2021-02-20 PROCEDURE — 91301 SARS-COV-2 / COVID-19 MRNA VACCINE (MODERNA) 100 MCG: CPT

## 2021-02-20 PROCEDURE — 0012A SARS-COV-2 / COVID-19 MRNA VACCINE (MODERNA) 100 MCG: CPT

## 2021-02-21 ENCOUNTER — APPOINTMENT (EMERGENCY)
Dept: RADIOLOGY | Facility: HOSPITAL | Age: 76
End: 2021-02-21
Payer: MEDICARE

## 2021-02-21 ENCOUNTER — HOSPITAL ENCOUNTER (OUTPATIENT)
Facility: HOSPITAL | Age: 76
Setting detail: OBSERVATION
Discharge: HOME | End: 2021-02-22
Attending: EMERGENCY MEDICINE | Admitting: INTERNAL MEDICINE
Payer: MEDICARE

## 2021-02-21 DIAGNOSIS — J81.1 PULMONARY EDEMA: ICD-10-CM

## 2021-02-21 DIAGNOSIS — I10 ESSENTIAL HYPERTENSION: ICD-10-CM

## 2021-02-21 DIAGNOSIS — R00.2 PALPITATIONS: Primary | ICD-10-CM

## 2021-02-21 DIAGNOSIS — N17.9 AKI (ACUTE KIDNEY INJURY) (HCC): ICD-10-CM

## 2021-02-21 DIAGNOSIS — N40.0 BPH (BENIGN PROSTATIC HYPERPLASIA): ICD-10-CM

## 2021-02-21 PROBLEM — G47.33 OSA (OBSTRUCTIVE SLEEP APNEA): Status: ACTIVE | Noted: 2021-02-21

## 2021-02-21 PROBLEM — R73.09 ELEVATED GLUCOSE LEVEL: Status: ACTIVE | Noted: 2021-02-21

## 2021-02-21 LAB
ALBUMIN SERPL BCP-MCNC: 4 G/DL (ref 3.5–5)
ALP SERPL-CCNC: 74 U/L (ref 46–116)
ALT SERPL W P-5'-P-CCNC: 21 U/L (ref 12–78)
ANION GAP SERPL CALCULATED.3IONS-SCNC: 8 MMOL/L (ref 4–13)
AST SERPL W P-5'-P-CCNC: 13 U/L (ref 5–45)
BASOPHILS # BLD AUTO: 0.03 THOUSANDS/ΜL (ref 0–0.1)
BASOPHILS NFR BLD AUTO: 1 % (ref 0–1)
BILIRUB SERPL-MCNC: 0.43 MG/DL (ref 0.2–1)
BUN SERPL-MCNC: 29 MG/DL (ref 5–25)
CALCIUM SERPL-MCNC: 8.7 MG/DL (ref 8.3–10.1)
CHLORIDE SERPL-SCNC: 102 MMOL/L (ref 100–108)
CHOLEST SERPL-MCNC: 189 MG/DL (ref 50–200)
CO2 SERPL-SCNC: 27 MMOL/L (ref 21–32)
CREAT SERPL-MCNC: 2.01 MG/DL (ref 0.6–1.3)
EOSINOPHIL # BLD AUTO: 0.57 THOUSAND/ΜL (ref 0–0.61)
EOSINOPHIL NFR BLD AUTO: 10 % (ref 0–6)
ERYTHROCYTE [DISTWIDTH] IN BLOOD BY AUTOMATED COUNT: 12.6 % (ref 11.6–15.1)
EST. AVERAGE GLUCOSE BLD GHB EST-MCNC: 126 MG/DL
GFR SERPL CREATININE-BSD FRML MDRD: 31 ML/MIN/1.73SQ M
GLUCOSE SERPL-MCNC: 145 MG/DL (ref 65–140)
HBA1C MFR BLD: 6 %
HCT VFR BLD AUTO: 38.2 % (ref 36.5–49.3)
HDLC SERPL-MCNC: 43 MG/DL
HGB BLD-MCNC: 12.1 G/DL (ref 12–17)
IMM GRANULOCYTES # BLD AUTO: 0.03 THOUSAND/UL (ref 0–0.2)
IMM GRANULOCYTES NFR BLD AUTO: 1 % (ref 0–2)
LDLC SERPL CALC-MCNC: 105 MG/DL (ref 0–100)
LYMPHOCYTES # BLD AUTO: 1.11 THOUSANDS/ΜL (ref 0.6–4.47)
LYMPHOCYTES NFR BLD AUTO: 18 % (ref 14–44)
MCH RBC QN AUTO: 30.4 PG (ref 26.8–34.3)
MCHC RBC AUTO-ENTMCNC: 31.7 G/DL (ref 31.4–37.4)
MCV RBC AUTO: 96 FL (ref 82–98)
MONOCYTES # BLD AUTO: 0.53 THOUSAND/ΜL (ref 0.17–1.22)
MONOCYTES NFR BLD AUTO: 9 % (ref 4–12)
NEUTROPHILS # BLD AUTO: 3.76 THOUSANDS/ΜL (ref 1.85–7.62)
NEUTS SEG NFR BLD AUTO: 61 % (ref 43–75)
NRBC BLD AUTO-RTO: 0 /100 WBCS
NT-PROBNP SERPL-MCNC: 50 PG/ML
PLATELET # BLD AUTO: 170 THOUSANDS/UL (ref 149–390)
PMV BLD AUTO: 9.6 FL (ref 8.9–12.7)
POTASSIUM SERPL-SCNC: 3.6 MMOL/L (ref 3.5–5.3)
PROT SERPL-MCNC: 8.1 G/DL (ref 6.4–8.2)
RBC # BLD AUTO: 3.98 MILLION/UL (ref 3.88–5.62)
SODIUM SERPL-SCNC: 137 MMOL/L (ref 136–145)
TRIGL SERPL-MCNC: 206 MG/DL
TROPONIN I SERPL-MCNC: <0.02 NG/ML
TSH SERPL DL<=0.05 MIU/L-ACNC: 2.01 UIU/ML (ref 0.36–3.74)
WBC # BLD AUTO: 6.03 THOUSAND/UL (ref 4.31–10.16)

## 2021-02-21 PROCEDURE — 99285 EMERGENCY DEPT VISIT HI MDM: CPT

## 2021-02-21 PROCEDURE — 83880 ASSAY OF NATRIURETIC PEPTIDE: CPT | Performed by: EMERGENCY MEDICINE

## 2021-02-21 PROCEDURE — 85025 COMPLETE CBC W/AUTO DIFF WBC: CPT | Performed by: EMERGENCY MEDICINE

## 2021-02-21 PROCEDURE — 99220 PR INITIAL OBSERVATION CARE/DAY 70 MINUTES: CPT | Performed by: INTERNAL MEDICINE

## 2021-02-21 PROCEDURE — 80061 LIPID PANEL: CPT | Performed by: INTERNAL MEDICINE

## 2021-02-21 PROCEDURE — 93005 ELECTROCARDIOGRAM TRACING: CPT

## 2021-02-21 PROCEDURE — 80053 COMPREHEN METABOLIC PANEL: CPT | Performed by: EMERGENCY MEDICINE

## 2021-02-21 PROCEDURE — 84484 ASSAY OF TROPONIN QUANT: CPT | Performed by: EMERGENCY MEDICINE

## 2021-02-21 PROCEDURE — 84443 ASSAY THYROID STIM HORMONE: CPT | Performed by: INTERNAL MEDICINE

## 2021-02-21 PROCEDURE — 71045 X-RAY EXAM CHEST 1 VIEW: CPT

## 2021-02-21 PROCEDURE — 1123F ACP DISCUSS/DSCN MKR DOCD: CPT | Performed by: INTERNAL MEDICINE

## 2021-02-21 PROCEDURE — 36415 COLL VENOUS BLD VENIPUNCTURE: CPT

## 2021-02-21 PROCEDURE — 83036 HEMOGLOBIN GLYCOSYLATED A1C: CPT | Performed by: INTERNAL MEDICINE

## 2021-02-21 PROCEDURE — 99285 EMERGENCY DEPT VISIT HI MDM: CPT | Performed by: EMERGENCY MEDICINE

## 2021-02-21 RX ORDER — ASPIRIN 81 MG/1
81 TABLET ORAL 2 TIMES DAILY
Status: DISCONTINUED | OUTPATIENT
Start: 2021-02-21 | End: 2021-02-22

## 2021-02-21 RX ORDER — AMLODIPINE BESYLATE 5 MG/1
5 TABLET ORAL DAILY
Status: DISCONTINUED | OUTPATIENT
Start: 2021-02-22 | End: 2021-02-22 | Stop reason: HOSPADM

## 2021-02-21 RX ORDER — HEPARIN SODIUM 5000 [USP'U]/ML
5000 INJECTION, SOLUTION INTRAVENOUS; SUBCUTANEOUS EVERY 8 HOURS SCHEDULED
Status: DISCONTINUED | OUTPATIENT
Start: 2021-02-21 | End: 2021-02-22 | Stop reason: HOSPADM

## 2021-02-21 RX ORDER — OMEPRAZOLE 10 MG/1
10 CAPSULE, DELAYED RELEASE ORAL DAILY
COMMUNITY

## 2021-02-21 RX ORDER — PANTOPRAZOLE SODIUM 20 MG/1
20 TABLET, DELAYED RELEASE ORAL
Status: DISCONTINUED | OUTPATIENT
Start: 2021-02-22 | End: 2021-02-22 | Stop reason: HOSPADM

## 2021-02-21 RX ORDER — TAMSULOSIN HYDROCHLORIDE 0.4 MG/1
0.4 CAPSULE ORAL
Status: DISCONTINUED | OUTPATIENT
Start: 2021-02-22 | End: 2021-02-22 | Stop reason: HOSPADM

## 2021-02-21 RX ORDER — SODIUM CHLORIDE 9 MG/ML
75 INJECTION, SOLUTION INTRAVENOUS CONTINUOUS
Status: DISCONTINUED | OUTPATIENT
Start: 2021-02-21 | End: 2021-02-22 | Stop reason: HOSPADM

## 2021-02-21 RX ADMIN — ASPIRIN 81 MG: 81 TABLET ORAL at 20:52

## 2021-02-21 RX ADMIN — SODIUM CHLORIDE 75 ML/HR: 0.9 INJECTION, SOLUTION INTRAVENOUS at 19:08

## 2021-02-21 RX ADMIN — HEPARIN SODIUM 5000 UNITS: 5000 INJECTION INTRAVENOUS; SUBCUTANEOUS at 22:23

## 2021-02-21 NOTE — ED PROVIDER NOTES
History  Chief Complaint   Patient presents with    Palpitations     pt states that his heart feels like his heart is skipping a beat, started today, denies hx, denies chest pain/sob      HPI     40-year-old male with history of asthma and hypertension presenting for evaluation of palpitations  Patient describes the palpitations as feeling like his heart is fluttering and skipping a beat  Started today  No prior cardiac history  Denies dizziness, lightheadedness, or chest pain  Reports occasional shortness of breath  No cough  Denies fevers or chills  He did receive his 1st COVID vaccination yesterday  No other associated symptoms  Prior to Admission Medications   Prescriptions Last Dose Informant Patient Reported? Taking? amLODIPine (NORVASC) 5 mg tablet   Yes Yes   aspirin (ECOTRIN LOW STRENGTH) 81 mg EC tablet  Self Yes Yes   Sig: Take 81 mg by mouth every 12 (twelve) hours   clobetasol (TEMOVATE) 0 05 % cream   Yes No   Sig: APPLY UP TO TWICE DAILY TO HANDS FOR 2 WEEKS ON THEN 1 WEEK OFF AS NEEDED  omeprazole (PriLOSEC) 10 mg delayed release capsule   Yes Yes   Sig: Take 10 mg by mouth daily      Facility-Administered Medications: None       Past Medical History:   Diagnosis Date    Asthma     Hypertension        Past Surgical History:   Procedure Laterality Date    CYST REMOVAL      TONSILLECTOMY         Family History   Problem Relation Age of Onset    Diabetes Mother     No Known Problems Father      I have reviewed and agree with the history as documented  E-Cigarette/Vaping     E-Cigarette/Vaping Substances     Social History     Tobacco Use    Smoking status: Never Smoker    Smokeless tobacco: Never Used   Substance Use Topics    Alcohol use: Yes     Comment: social    Drug use: Not on file       Review of Systems   Constitutional: Negative for chills and fever  HENT: Negative for congestion  Eyes: Negative for visual disturbance     Respiratory: Positive for shortness of breath (Intermittent, not present currently)  Negative for cough  Cardiovascular: Positive for palpitations  Negative for chest pain and leg swelling  Gastrointestinal: Negative for abdominal pain, diarrhea, nausea and vomiting  Genitourinary: Negative for dysuria and frequency  Musculoskeletal: Negative for arthralgias, back pain, neck pain and neck stiffness  Skin: Negative for rash  Neurological: Negative for weakness, numbness and headaches  Psychiatric/Behavioral: Negative for agitation, behavioral problems and confusion  Physical Exam  Physical Exam  Constitutional:       General: He is not in acute distress  Appearance: He is well-developed  He is not diaphoretic  HENT:      Head: Normocephalic and atraumatic  Right Ear: External ear normal       Left Ear: External ear normal       Nose: Nose normal    Eyes:      Conjunctiva/sclera: Conjunctivae normal    Neck:      Musculoskeletal: Normal range of motion and neck supple  Cardiovascular:      Rate and Rhythm: Normal rate and regular rhythm  Pulses: Normal pulses  Heart sounds: Normal heart sounds  No murmur  No friction rub  No gallop  Pulmonary:      Effort: Pulmonary effort is normal  No respiratory distress  Breath sounds: Normal breath sounds  No wheezing or rales  Abdominal:      General: Bowel sounds are normal  There is no distension  Palpations: Abdomen is soft  Tenderness: There is no abdominal tenderness  There is no guarding  Musculoskeletal: Normal range of motion  General: No deformity  Comments: Trace edema to the bilateral ankles  No calf swelling or tenderness  Skin:     General: Skin is warm and dry  Neurological:      Mental Status: He is alert and oriented to person, place, and time  Motor: No abnormal muscle tone                   Vital Signs  ED Triage Vitals   Temperature Pulse Respirations Blood Pressure SpO2   02/21/21 1513 02/21/21 1513 02/21/21 1513 02/21/21 1513 02/21/21 1513   97 7 °F (36 5 °C) 87 18 165/75 96 %      Temp Source Heart Rate Source Patient Position - Orthostatic VS BP Location FiO2 (%)   02/21/21 1513 02/21/21 1522 02/21/21 1727 02/21/21 1727 --   Oral Monitor Sitting Right arm       Pain Score       02/21/21 1537       No Pain           Vitals:    02/21/21 1600 02/21/21 1630 02/21/21 1727 02/21/21 1915   BP: 133/78 125/75 134/82 142/73   Pulse: 74 74 79 76   Patient Position - Orthostatic VS:   Sitting Lying         Visual Acuity  Visual Acuity      Most Recent Value   L Pupil Size (mm)  3   R Pupil Size (mm)  3          ED Medications  Medications   heparin (porcine) subcutaneous injection 5,000 Units (has no administration in time range)   amLODIPine (NORVASC) tablet 5 mg (has no administration in time range)   aspirin (ECOTRIN LOW STRENGTH) EC tablet 81 mg (has no administration in time range)   pantoprazole (PROTONIX) EC tablet 20 mg (has no administration in time range)   tamsulosin (FLOMAX) capsule 0 4 mg (has no administration in time range)   sodium chloride 0 9 % infusion (75 mL/hr Intravenous New Bag 2/21/21 1908)       Diagnostic Studies  Results Reviewed     Procedure Component Value Units Date/Time    Lipid Panel with Direct LDL reflex [[id number]]  (Abnormal) Collected: 02/21/21 1525    Lab Status: Final result Specimen: Blood from Arm, Left Updated: 02/21/21 1847     Cholesterol 189 mg/dL      Triglycerides 206 mg/dL      HDL, Direct 43 mg/dL      LDL Calculated 105 mg/dL     TSH, 3rd generation [[id number]]  (Normal) Collected: 02/21/21 1525    Lab Status: Final result Specimen: Blood from Arm, Left Updated: 02/21/21 1847     TSH 3RD GENERATON 2 008 uIU/mL     Narrative:      Patients undergoing fluorescein dye angiography may retain small amounts of fluorescein in the body for 48-72 hours post procedure  Samples containing fluorescein can produce falsely depressed TSH values   If the patient had this procedure,a specimen should be resubmitted post fluorescein clearance  Hemoglobin A1C w/ EAG Estimation [[id number]] Collected: 02/21/21 1525    Lab Status:  In process Specimen: Blood from Arm, Left Updated: 02/21/21 1833    Platelet count [[id number]]     Lab Status: No result Specimen: Blood     NT-BNP PRO [[id number]]  (Normal) Collected: 02/21/21 1525    Lab Status: Final result Specimen: Blood from Arm, Left Updated: 02/21/21 1755     NT-proBNP 50 pg/mL     Comprehensive metabolic panel [[id number]]  (Abnormal) Collected: 02/21/21 1525    Lab Status: Final result Specimen: Blood from Arm, Left Updated: 02/21/21 1601     Sodium 137 mmol/L      Potassium 3 6 mmol/L      Chloride 102 mmol/L      CO2 27 mmol/L      ANION GAP 8 mmol/L      BUN 29 mg/dL      Creatinine 2 01 mg/dL      Glucose 145 mg/dL      Calcium 8 7 mg/dL      AST 13 U/L      ALT 21 U/L      Alkaline Phosphatase 74 U/L      Total Protein 8 1 g/dL      Albumin 4 0 g/dL      Total Bilirubin 0 43 mg/dL      eGFR 31 ml/min/1 73sq m     Narrative:      [facility] guidelines for Chronic Kidney Disease (CKD):     Stage 1 with normal or high GFR (GFR > 90 mL/min/1 73 square meters)    Stage 2 Mild CKD (GFR = 60-89 mL/min/1 73 square meters)    Stage 3A Moderate CKD (GFR = 45-59 mL/min/1 73 square meters)    Stage 3B Moderate CKD (GFR = 30-44 mL/min/1 73 square meters)    Stage 4 Severe CKD (GFR = 15-29 mL/min/1 73 square meters)    Stage 5 End Stage CKD (GFR <15 mL/min/1 73 square meters)  Note: GFR calculation is accurate only with a steady state creatinine    Troponin I [[id number]]  (Normal) Collected: 02/21/21 1525    Lab Status: Final result Specimen: Blood from Arm, Left Updated: 02/21/21 1557     Troponin I <0 02 ng/mL     CBC and differential [[id number]]  (Abnormal) Collected: 02/21/21 1525    Lab Status: Final result Specimen: Blood from Arm, Left Updated: 02/21/21 1542     WBC 6 03 Thousand/uL      RBC 3 98 Million/uL      Hemoglobin 12 1 g/dL      Hematocrit 38 2 %      MCV 96 fL      MCH 30 4 pg      MCHC 31 7 g/dL      RDW 12 6 %      MPV 9 6 fL      Platelets 187 Thousands/uL      nRBC 0 /100 WBCs      Neutrophils Relative 61 %      Immat GRANS % 1 %      Lymphocytes Relative 18 %      Monocytes Relative 9 %      Eosinophils Relative 10 %      Basophils Relative 1 %      Neutrophils Absolute 3 76 Thousands/µL      Immature Grans Absolute 0 03 Thousand/uL      Lymphocytes Absolute 1 11 Thousands/µL      Monocytes Absolute 0 53 Thousand/µL      Eosinophils Absolute 0 57 Thousand/µL      Basophils Absolute 0 03 Thousands/µL                  XR chest 1 view portable   ED Interpretation by Elvira Beltrán MD (02/21 1713)   Pulmonary edema                 Procedures  Procedures         ED Course  ED Course as of Feb 21 1950   Sun Feb 21, 2021   1717 RDW: 12 6                                           MDM  Number of Diagnoses or Management Options  ROSELYN (acute kidney injury) [facility]): new and requires workup  Palpitations: new and requires workup  Pulmonary edema: new and requires workup  Diagnosis management comments: Nontoxic appearing  Afebrile and hemodynamically stable  Patient is noted to have sinus arrhythmia on the monitor  I personally interpreted his EKG, which reveals normal rate, normal sinus rhythm, normal axis, normal intervals, T-wave inversion isolated to lead 3, no ST segment deviation  Troponin obtained in the setting of risk stratification which is undetectable, patient denies chest pain  Chest x-ray obtained which shows pulmonary edema on my read  Patient also found to have elevated creatinine to 2 01  He denies history of CKD  Elevated creatinine and pulmonary edema in the setting of new onset palpitations that is potentially concerning for new onset heart failure  Will admit for further management         Amount and/or Complexity of Data Reviewed  Clinical lab tests: ordered and reviewed  Tests in the radiology section of CPT®: ordered and reviewed  Independent visualization of images, tracings, or specimens: yes    Patient Progress  Patient progress: stable           Disposition  Final diagnoses:   Palpitations   ROSELYN (acute kidney injury) ([facility] 75 )   Pulmonary edema     Time reflects when diagnosis was documented in both MDM as applicable and the Disposition within this note     Time User Action Codes Description Comment    2/21/2021  5:19 PM Reda Sluder Add [R00 2] Palpitations     2/21/2021  5:20 PM Reda Sluder Add [N17 9] ROSELYN (acute kidney injury) ([facility] 75 )     2/21/2021  5:20 PM Reda Sluder Add [J81 1] Pulmonary edema       ED Disposition     ED Disposition Condition Date/Time Comment    Admit Stable Sun Feb 21, 2021  5:19 PM Case was discussed with PHANI and the patient's admission status was agreed to be Admission Status: inpatient status to the service of Dr Asa Zee  Follow-up Information    None         Patient's Medications   Discharge Prescriptions    No medications on file     No discharge procedures on file      PDMP Review     None          ED Provider  Electronically Signed by           Kimmie Hooper MD  02/21/21 9909

## 2021-02-21 NOTE — ASSESSMENT & PLAN NOTE
Patient states that he snores very loudly, and usually sleeps flat on a reclining chair  Patient has never had sleep study or has to use a CPAP machine   Outpatient referral for sleep study through his PCP

## 2021-02-21 NOTE — ASSESSMENT & PLAN NOTE
Patient complaining of skipped heart beats one-day after he got covid 19 vaccine, the 2nd dose  Patient denied any prior history of cardiac events  Patient had a stress echo done back in 2015 that showed 60% EF with no wall motion abnormalities  TSH within normal limits  On telemetry:  Isolate premature ventricular contractions and atrial ventricular contractions, no tachyarrhythmia  Most likely benign PVCs    Patient will follow up with his PCP

## 2021-02-21 NOTE — ASSESSMENT & PLAN NOTE
Patient states that he snores very loudly, and usually sleeps flat on a reclining chair  Patient has never had sleep study or has to use a CPAP machine  Stop-Ban    Plan:  · Outpatient referral for sleep study upon discharge

## 2021-02-21 NOTE — H&P
H&P- Lissette Gist  1945, 66 y o  male MRN: [id number]    Unit/Bed#: ED 15 Encounter: [id number]    Primary Care Provider: Fede Nelson DO   Date and time admitted to hospital: 2021  3:14 PM        Palpitations  Assessment & Plan  Patient is complaining of palpitations since last night after obtaining COVID-19 vaccine  On telemetry patient has PVC present  Patient denies any prior history of cardiac events  Patient had a stress echo done back in  that showed 60% EF with no wall motion abnormalities  Plan:  · Obtain TSH  · Obtain HbA1c  · Obtain lipid panel  · Monitor on obs/tele  · Continue home dose of aspirin  ROSELYN (acute kidney injury) [facility])  Assessment & Plan  Patient states he has never had any difficulties with his kidneys  Patient states he has difficulty going bathroom at which point he was prescribed Flomax  Patient states the lab works that his primary care physician had done for him showed "normal kidney" functions last year  Patient denies any changes to eating, drinking, urination  Denies any dysuria, pyuria, urinary urgency, frequency, fevers or chills  ROSELYN likely secondary to dehydration versus reactive from COVID vaccine    Plan:  · Gentle hydration  · BMP tomorrow a m     YESENIA (obstructive sleep apnea)  Assessment & Plan  Patient states that he snores very loudly, and usually sleeps flat on a reclining chair  Patient has never had sleep study or has to use a CPAP machine  Stop-Ban    Plan:  · Outpatient referral for sleep study upon discharge  Essential hypertension  Assessment & Plan  Stable, will continue patient's home regimen of amlodipine 5 mg q d  Denver Krueger Elevated glucose level  Assessment & Plan  Will obtain baseline A1c  VTE Prophylaxis: Heparin  / sequential compression device   Code Status: Full  POLST: POLST form is not discussed and not completed at this time      Anticipated Length of Stay:  Patient will be admitted on an Observation basis with an anticipated length of stay of  Less than 2 midnights  Justification for Hospital Stay:  ROSELYN with palpitations    Chief Complaint:   Palpitations    History of Present Illness:    Lisette Hernandez  is a 66 y o  male who presents with 1 day history of palpitations  Patient states that he obtain a COVID-19 vaccine  States that last night he started to have skipped beats along with pulsatile tinnitus  Patient states that he also started to experience some shortness of breath earlier today along with worsening palpitations, decided to come to the ED  Patient states that prior to last night he was in [address] [place] [address]  Patient denies any shortness of breath, palpitations, chest pain or discomfort at rest or exertion prior to last night  Patient states he obtain a routine cardiac testing back in 2015  Patient states that when he went to his primary care doctor, he was told that his kidney function was "normal" last year  Patient denies any recent travel, denies any fevers or chills  Patient also denies any nausea, vomiting or diarrhea  Patient denies any recent weight gain, states that he is able to lay flat with out any difficulties, denies any PND, denies any abnormal weight gain or abdominal distention  Review of Systems:    Review of Systems   Constitutional: Negative for chills and fever  HENT: Negative for ear pain and sore throat  Eyes: Negative for pain and visual disturbance  Respiratory: Negative for cough and shortness of breath  Cardiovascular: Positive for palpitations  Negative for chest pain  Gastrointestinal: Negative for abdominal pain and vomiting  Genitourinary: Negative for dysuria and hematuria  Musculoskeletal: Negative for arthralgias and back pain  Skin: Negative for color change and rash  Neurological: Negative for seizures and syncope  All other systems reviewed and are negative        Past Medical and Surgical History:     Past Medical History:   Diagnosis Date    Asthma     Hypertension        Past Surgical History:   Procedure Laterality Date    CYST REMOVAL      TONSILLECTOMY         Meds/Allergies:    Prior to Admission medications    Medication Sig Start Date End Date Taking? Authorizing Provider   amLODIPine (NORVASC) 5 mg tablet  4/12/19  Yes Historical Provider, MD   aspirin (ECOTRIN LOW STRENGTH) 81 mg EC tablet Take 81 mg by mouth every 12 (twelve) hours   Yes Historical Provider, MD   omeprazole (PriLOSEC) 10 mg delayed release capsule Take 10 mg by mouth daily   Yes Historical Provider, MD   clobetasol (TEMOVATE) 0 05 % cream APPLY UP TO TWICE DAILY TO HANDS FOR 2 WEEKS ON THEN 1 WEEK OFF AS NEEDED  6/9/19   Historical Provider, MD     I have reviewed home medications with patient personally  Allergies: No Known Allergies    Social History:     Marital Status: /Civil Union   Occupation:   Patient Pre-hospital Living Situation:   Patient Pre-hospital Level of Mobility:   Patient Pre-hospital Diet Restrictions:   Substance Use History:   Social History     Substance and Sexual Activity   Alcohol Use Yes    Comment: social     Social History     Tobacco Use   Smoking Status Never Smoker   Smokeless Tobacco Never Used     Social History     Substance and Sexual Activity   Drug Use Not on file       Family History:    non-contributory    Physical Exam:     Vitals:   Blood Pressure: 134/82 (02/21/21 1727)  Pulse: 79 (02/21/21 1727)  Temperature: 97 7 °F (36 5 °C) (02/21/21 1513)  Temp Source: Oral (02/21/21 1513)  Respirations: 16 (02/21/21 1727)  Weight - Scale: 85 3 kg (188 lb) (02/21/21 1513)  SpO2: 95 % (02/21/21 1727)    Physical Exam  Vitals signs reviewed  Constitutional:       Appearance: Normal appearance  HENT:      Head: Normocephalic and atraumatic  Right Ear: Tympanic membrane normal       Left Ear: Tympanic membrane normal       Nose: Nose normal       Mouth/Throat:      Mouth: Mucous membranes are dry     Eyes: Extraocular Movements: Extraocular movements intact  Pupils: Pupils are equal, round, and reactive to light  Neck:      Comments: No JVD appreciated  Cardiovascular:      Rate and Rhythm: Normal rate and regular rhythm  Pulses: Normal pulses  Heart sounds: No murmur  No gallop  Pulmonary:      Effort: Pulmonary effort is normal  No respiratory distress  Breath sounds: No stridor  No wheezing or rales  Chest:      Chest wall: No tenderness  Abdominal:      General: Abdomen is flat  Bowel sounds are normal  There is no distension  Palpations: Abdomen is soft  Tenderness: There is no abdominal tenderness  Musculoskeletal:         General: No swelling  Right lower leg: No edema  Left lower leg: No edema  Skin:     General: Skin is warm and dry  Capillary Refill: Capillary refill takes less than 2 seconds  Neurological:      General: No focal deficit present  Mental Status: He is alert and oriented to person, place, and time  Cranial Nerves: No cranial nerve deficit  Motor: No weakness  Psychiatric:         Mood and Affect: Mood normal          Behavior: Behavior normal              Additional Data:     Lab Results: I have personally reviewed pertinent reports  Results from last 7 days   Lab Units 02/21/21  1525   WBC Thousand/uL 6 03   HEMOGLOBIN g/dL 12 1   HEMATOCRIT % 38 2   PLATELETS Thousands/uL 170   NEUTROS PCT % 61   LYMPHS PCT % 18   MONOS PCT % 9   EOS PCT % 10*     Results from last 7 days   Lab Units 02/21/21  1525   POTASSIUM mmol/L 3 6   CHLORIDE mmol/L 102   CO2 mmol/L 27   BUN mg/dL 29*   CREATININE mg/dL 2 01*   CALCIUM mg/dL 8 7   ALK PHOS U/L 74   ALT U/L 21   AST U/L 13           Imaging: I have personally reviewed pertinent reports  No results found      EKG, Pathology, and Other Studies Reviewed on Admission:   · EKG:  Normal sinus rhythm    [facility] / Care Everywhere Records Reviewed: Yes     ** Please Note: This note has been constructed using a voice recognition system   **

## 2021-02-21 NOTE — ASSESSMENT & PLAN NOTE
Patient states he has never had any difficulties with his kidneys  Patient states he has difficulty going bathroom at which point he was prescribed Flomax  Patient states the lab works that his primary care physician had done for him showed "normal kidney" functions last year  Patient denies any changes to eating, drinking, urination  Denies any dysuria, pyuria, urinary urgency, frequency, fevers or chills  ROSELYN likely secondary to dehydration versus reactive from COVID vaccine    Plan:  · Gentle hydration  · BMP tomorrow a m

## 2021-02-21 NOTE — ASSESSMENT & PLAN NOTE
Unclear patient's baseline creatinine    Creatinine slightly improved with gentle IV hydration  Patient will be discharged home, he will follow up with his PCP and will have BMP done this week

## 2021-02-21 NOTE — ASSESSMENT & PLAN NOTE
Patient is complaining of palpitations since last night after obtaining COVID-19 vaccine  On telemetry patient has PVC present  Patient denies any prior history of cardiac events  Patient had a stress echo done back in 2015 that showed 60% EF with no wall motion abnormalities  Plan:  · Obtain TSH  · Obtain HbA1c  · Obtain lipid panel  · Monitor on obs/tele  · Continue home dose of aspirin

## 2021-02-22 VITALS
BODY MASS INDEX: 29.44 KG/M2 | SYSTOLIC BLOOD PRESSURE: 136 MMHG | WEIGHT: 188 LBS | TEMPERATURE: 97.7 F | DIASTOLIC BLOOD PRESSURE: 83 MMHG | RESPIRATION RATE: 18 BRPM | HEART RATE: 70 BPM | OXYGEN SATURATION: 93 %

## 2021-02-22 LAB
ANION GAP SERPL CALCULATED.3IONS-SCNC: 7 MMOL/L (ref 4–13)
BASOPHILS # BLD AUTO: 0.06 THOUSANDS/ΜL (ref 0–0.1)
BASOPHILS NFR BLD AUTO: 1 % (ref 0–1)
BUN SERPL-MCNC: 24 MG/DL (ref 5–25)
CALCIUM SERPL-MCNC: 8.6 MG/DL (ref 8.3–10.1)
CHLORIDE SERPL-SCNC: 105 MMOL/L (ref 100–108)
CO2 SERPL-SCNC: 28 MMOL/L (ref 21–32)
CREAT SERPL-MCNC: 1.96 MG/DL (ref 0.6–1.3)
EOSINOPHIL # BLD AUTO: 0.73 THOUSAND/ΜL (ref 0–0.61)
EOSINOPHIL NFR BLD AUTO: 12 % (ref 0–6)
ERYTHROCYTE [DISTWIDTH] IN BLOOD BY AUTOMATED COUNT: 12.8 % (ref 11.6–15.1)
GFR SERPL CREATININE-BSD FRML MDRD: 32 ML/MIN/1.73SQ M
GLUCOSE SERPL-MCNC: 109 MG/DL (ref 65–140)
HCT VFR BLD AUTO: 35.3 % (ref 36.5–49.3)
HGB BLD-MCNC: 11.5 G/DL (ref 12–17)
IMM GRANULOCYTES # BLD AUTO: 0.02 THOUSAND/UL (ref 0–0.2)
IMM GRANULOCYTES NFR BLD AUTO: 0 % (ref 0–2)
LYMPHOCYTES # BLD AUTO: 1.74 THOUSANDS/ΜL (ref 0.6–4.47)
LYMPHOCYTES NFR BLD AUTO: 27 % (ref 14–44)
MCH RBC QN AUTO: 31 PG (ref 26.8–34.3)
MCHC RBC AUTO-ENTMCNC: 32.6 G/DL (ref 31.4–37.4)
MCV RBC AUTO: 95 FL (ref 82–98)
MONOCYTES # BLD AUTO: 0.63 THOUSAND/ΜL (ref 0.17–1.22)
MONOCYTES NFR BLD AUTO: 10 % (ref 4–12)
NEUTROPHILS # BLD AUTO: 3.17 THOUSANDS/ΜL (ref 1.85–7.62)
NEUTS SEG NFR BLD AUTO: 50 % (ref 43–75)
NRBC BLD AUTO-RTO: 0 /100 WBCS
PLATELET # BLD AUTO: 155 THOUSANDS/UL (ref 149–390)
PMV BLD AUTO: 9.3 FL (ref 8.9–12.7)
POTASSIUM SERPL-SCNC: 3.8 MMOL/L (ref 3.5–5.3)
RBC # BLD AUTO: 3.71 MILLION/UL (ref 3.88–5.62)
SODIUM SERPL-SCNC: 140 MMOL/L (ref 136–145)
WBC # BLD AUTO: 6.35 THOUSAND/UL (ref 4.31–10.16)

## 2021-02-22 PROCEDURE — 99217 PR OBSERVATION CARE DISCHARGE MANAGEMENT: CPT | Performed by: HOSPITALIST

## 2021-02-22 PROCEDURE — 85025 COMPLETE CBC W/AUTO DIFF WBC: CPT | Performed by: INTERNAL MEDICINE

## 2021-02-22 PROCEDURE — 80048 BASIC METABOLIC PNL TOTAL CA: CPT | Performed by: INTERNAL MEDICINE

## 2021-02-22 PROCEDURE — 36415 COLL VENOUS BLD VENIPUNCTURE: CPT | Performed by: INTERNAL MEDICINE

## 2021-02-22 RX ORDER — TAMSULOSIN HYDROCHLORIDE 0.4 MG/1
0.4 CAPSULE ORAL
Qty: 30 CAPSULE | Refills: 0 | Status: SHIPPED | OUTPATIENT
Start: 2021-02-22

## 2021-02-22 RX ORDER — ASPIRIN 81 MG/1
81 TABLET ORAL DAILY
Qty: 30 TABLET | Refills: 0 | Status: SHIPPED | OUTPATIENT
Start: 2021-02-22 | End: 2021-05-28 | Stop reason: ALTCHOICE

## 2021-02-22 RX ADMIN — PANTOPRAZOLE SODIUM 20 MG: 20 TABLET, DELAYED RELEASE ORAL at 06:16

## 2021-02-22 RX ADMIN — HEPARIN SODIUM 5000 UNITS: 5000 INJECTION INTRAVENOUS; SUBCUTANEOUS at 06:15

## 2021-02-22 RX ADMIN — ASPIRIN 81 MG: 81 TABLET ORAL at 08:55

## 2021-02-22 RX ADMIN — AMLODIPINE BESYLATE 5 MG: 5 TABLET ORAL at 08:55

## 2021-02-22 NOTE — UTILIZATION REVIEW
Initial Clinical Review    Admission: Date/Time/Statement:  2/21/2021 1849 Observation   Admission Orders (From admission, onward)     Ordered        02/21/21 1849  Place in Observation  Once                   Orders Placed This Encounter   Procedures    Place in Observation     Standing Status:   Standing     Number of Occurrences:   1     Order Specific Question:   Level of Care     Answer:   Med Surg [16]     ED Arrival Information     Expected Arrival Acuity Means of Arrival Escorted By Service Admission Type    - 2/21/2021 15:07 Urgent Walk-In Spouse Hospitalist Urgent    Arrival Complaint    heart skipping beat        Chief Complaint   Patient presents with    Palpitations     pt states that his heart feels like his heart is skipping a beat, started today, denies hx, denies chest pain/sob      · Assessment/Plan: 70-year-old male with history of hypertension, benign prostatic hypertrophy presents with missed beat, palpitation after receiving his 2nd COVID shot  Patient denies any myalgias, fever, chills, cough, shortness of breath, chest pain, dizziness or syncopal episode  Exam unremarkable  · EKG with PACs and PVCs  · First set of cardiac enzyme is negative  Agree with overnight observation on telemetry monitor with IV hydration  Patient does have creatinine elevation at 2 0 with an non baseline  Continue with gentle IV hydration  Follow-up on postvoid residual in view of his significant history of BPH    ED Triage Vitals   Temperature Pulse Respirations Blood Pressure SpO2   02/21/21 1513 02/21/21 1513 02/21/21 1513 02/21/21 1513 02/21/21 1513   97 7 °F (36 5 °C) 87 18 165/75 96 %      Temp Source Heart Rate Source Patient Position - Orthostatic VS BP Location FiO2 (%)   02/21/21 1513 02/21/21 1522 02/21/21 1727 02/21/21 1727 --   Oral Monitor Sitting Right arm       Pain Score       02/21/21 1537       No Pain          Wt Readings from Last 1 Encounters:   02/21/21 85 3 kg (188 lb)     Additional Vital Signs:   02/22/21 0545  --  64  18  122/71  91  94 %  None (Room air)  Lying   02/22/21 0345  --  62  18  121/78  95  93 %  None (Room air)  Lying   02/22/21 0245  --  66  18  122/78  --  93 %  None (Room air)  Lying   02/22/21 0045  --  68  18  146/77  102  93 %  None (Room air)  Lying   02/21/21 2245  --  74  18  129/75  94  93 %  None (Room air)  Lying   02/21/21 2045  --  70  18  137/83  104  94 %  None (Room air)  Lying   02/21/21 1915  --  76  18  142/73  102  95 %  None (Room air)  Lying   02/21/21 1727  --  79  16  134/82  --  95 %  None (Room air)         Pertinent Labs/Diagnostic Test Results:   2/21/2021 CxR - wet read - pulmonary edema     Results from last 7 days   Lab Units 02/22/21  0414 02/21/21  1525   WBC Thousand/uL 6 35 6 03   HEMOGLOBIN g/dL 11 5* 12 1   HEMATOCRIT % 35 3* 38 2   PLATELETS Thousands/uL 155 170   NEUTROS ABS Thousands/µL 3 17 3 76     Results from last 7 days   Lab Units 02/22/21  0414 02/21/21  1525   SODIUM mmol/L 140 137   POTASSIUM mmol/L 3 8 3 6   CHLORIDE mmol/L 105 102   CO2 mmol/L 28 27   ANION GAP mmol/L 7 8   BUN mg/dL 24 29*   CREATININE mg/dL 1 96* 2 01*   EGFR ml/min/1 73sq m 32 31   CALCIUM mg/dL 8 6 8 7     Results from last 7 days   Lab Units 02/21/21  1525   AST U/L 13   ALT U/L 21   ALK PHOS U/L 74   TOTAL PROTEIN g/dL 8 1   ALBUMIN g/dL 4 0   TOTAL BILIRUBIN mg/dL 0 43     Results from last 7 days   Lab Units 02/22/21  0414 02/21/21  1525   GLUCOSE RANDOM mg/dL 109 145*     Results from last 7 days   Lab Units 02/21/21  1525   HEMOGLOBIN A1C % 6 0*   EAG mg/dl 126     Results from last 7 days   Lab Units 02/21/21  1525   TROPONIN I ng/mL <0 02     Results from last 7 days   Lab Units 02/21/21  1525   TSH 3RD GENERATON uIU/mL 2 008     Results from last 7 days   Lab Units 02/21/21  1525   NT-PRO BNP pg/mL 50       ED Treatment:   Medication Administration from 02/21/2021 1507 to 02/22/2021 2130       Date/Time Order Dose Route Action Comments 02/22/2021 0615 heparin (porcine) subcutaneous injection 5,000 Units 5,000 Units Subcutaneous Given      02/21/2021 2223 heparin (porcine) subcutaneous injection 5,000 Units 5,000 Units Subcutaneous Given      02/21/2021 2052 aspirin (ECOTRIN LOW STRENGTH) EC tablet 81 mg 81 mg Oral Given      02/22/2021 0616 pantoprazole (PROTONIX) EC tablet 20 mg 20 mg Oral Given      02/21/2021 1908 sodium chloride 0 9 % infusion 75 mL/hr Intravenous New Bag         Past Medical History:   Diagnosis Date    Asthma     Hypertension      Present on Admission:  **None**      Admitting Diagnosis: Palpitations [R00 2]  Age/Sex: 66 y o  male  Admission Orders:  Scheduled Medications:  amLODIPine, 5 mg, Oral, Daily  aspirin, 81 mg, Oral, BID  heparin (porcine), 5,000 Units, Subcutaneous, Q8H ANITA  pantoprazole, 20 mg, Oral, Early Morning  tamsulosin, 0 4 mg, Oral, Daily With Dinner      Continuous IV Infusions:  sodium chloride, 75 mL/hr, Intravenous, Continuous      PRN Meds: none     Telemetry     Network Utilization Review Department  ATTENTION: Please call with any questions or concerns to [phone] and carefully listen to the prompts so that you are directed to the right person  All voicemails are confidential   Bethanie Pichardo all requests for admission clinical reviews, approved or denied determinations and any other requests to dedicated fax number below belonging to the campus where the patient is receiving treatment   List of dedicated fax numbers for the Facilities:  [address] DENIALS (Administrative/Medical Necessity) [phone]   [address] [place] [address] (Maternity/NICU/Pediatrics) 261 [facility] [id number] [facility] [address] ([address]) [address] [place] [address] Alice Collado [address] [phone]   [place] [address] [phone]

## 2021-02-22 NOTE — DISCHARGE INSTRUCTIONS
Premature Ventricular Contractions   WHAT YOU NEED TO KNOW:   Premature ventricular contractions (PVCs) are an interruption in your heart rhythm  They are caused by an early signal for your heart to pump  Your risk of PVCs increases when you drink alcohol or caffeine, or if you are fatigued or stressed  It is very important for you to follow up with your healthcare provider so the cause of your PVCs can be diagnosed and treated  DISCHARGE INSTRUCTIONS:   Call 911 if:   · You have any of the following signs of a heart attack:      ? Squeezing, pressure, or pain in your chest    ? You may  also have any of the following:     § Discomfort or pain in your back, neck, jaw, stomach, or arm    § Shortness of breath    § Nausea or vomiting    § Lightheadedness or a sudden cold sweat      Contact your healthcare provider if:   · You still have symptoms after treatment, or your symptoms worsen  · You have questions or concerns about your condition or care  Medicines:   · Heart medicine  may be given to make your heart beat at a regular rate and rhythm  · Take your medicine as directed  Contact your healthcare provider if you think your medicine is not helping or if you have side effects  Tell him or her if you are allergic to any medicine  Keep a list of the medicines, vitamins, and herbs you take  Include the amounts, and when and why you take them  Bring the list or the pill bottles to follow-up visits  Carry your medicine list with you in case of an emergency  Follow up with your healthcare provider as directed: You may need another EKG within the first 10 days and more testing for up to 12 months  Write down your questions so you remember to ask them during your visits  © Copyright [address] Information is for End User's use only and may not be sold, redistributed or otherwise used for commercial purposes   All illustrations and images included in CareNotes® are the copyrighted property of A  [employer]  or [address] [facility]  The above information is an  only  It is not intended as medical advice for individual conditions or treatments  Talk to your doctor, nurse or pharmacist before following any medical regimen to see if it is safe and effective for you

## 2021-02-22 NOTE — DISCHARGE INSTR - AVS FIRST PAGE
Dear Jenny Payton ,     It was our pleasure to care for you here at [facility]  It is our hope that we were always able to exceed the expected standards for your care during your stay  You were hospitalized due to palpitations   You were cared for on the emergency room by Hayden Ellis MD under the service of Yara James MD with the [facility] who covers for your primary care physician (PCP), Sherman Echeverria DO, while you were hospitalized  If you have any questions or concerns related to this hospitalization, you may contact us at [phone]  For follow up as well as any medication refills, we recommend that you follow up with your primary care physician  A registered nurse will reach out to you by phone within a few days after your discharge to answer any additional questions that you may have after going home  However, at this time we provide for you here, the most important instructions / recommendations at discharge:     · Notable Medication Adjustments -   · Take one aspirin daily instead of 2 or more  Take Flomax daily ( for prostate)  · Take amlodipine as before   · Testing Required after Discharge -   · Bmp in 3 days   · Important follow up information -   · Please, follow up with your primary care physician in one week   · Other Instructions -   · Keep your self hydrated   · Please review this entire after visit summary as additional general instructions including medication list, appointments, activity, diet, any pertinent wound care, and other additional recommendations from your care team that may be provided for you        Sincerely,     Hayden Ellis MD

## 2021-02-22 NOTE — DISCHARGE SUMMARY
Discharge- Jillian Vickers  1945, 66 y o  male MRN: [id number]    Unit/Bed#: ED 15 Encounter: [id number]    Primary Care Provider: Katey Bailey DO   Date and time admitted to hospital: 2/21/2021  3:14 PM        * Palpitations  Assessment & Plan  Patient complaining of skipped heart beats one-day after he got covid 19 vaccine, the 2nd dose  Patient denied any prior history of cardiac events  Patient had a stress echo done back in 2015 that showed 60% EF with no wall motion abnormalities  TSH within normal limits  On telemetry:  Isolate premature ventricular contractions and atrial ventricular contractions, no tachyarrhythmia  Most likely benign PVCs  Patient will follow up with his PCP     YESENIA (obstructive sleep apnea)  Assessment & Plan  Patient states that he snores very loudly, and usually sleeps flat on a reclining chair  Patient has never had sleep study or has to use a CPAP machine  Outpatient referral for sleep study through his PCP    Elevated glucose level  Assessment & Plan  Hemoglobin A1c 6 0  Patient will follow up with his PCP    ROSELYN (acute kidney injury) [facility])  Assessment & Plan  Unclear patient's baseline creatinine  Creatinine slightly improved with gentle IV hydration  Patient will be discharged home, he will follow up with his PCP and will have BMP done this week      Essential hypertension  Assessment & Plan  Pressure reviewed and acceptable  Continue amlodipine 5 mg q d  Rut Cerda Discharging Resident Physician: Thien Venegas MD  Attending: No att  providers found  PCP: Katey Bailey DO  Admission Date: 2/21/2021  Discharge Date: 02/22/21    Disposition:     Home    Reason for Admission:  Palpitations    Consultations During Hospital Stay:  ·     Procedures Performed:     No procedures    Significant Findings / Test Results:     · Isolated PVCs    Incidental Findings:   · None    Test Results Pending at Discharge (will require follow up):    · None     Outpatient Tests Requested:  · BMP in 3 days    Complications:  No Complications    Hospital Course:     Comfort Krueger  is a 66 y o  male past medical history of hypertension, patient who originally presented to the hospital on 2/21/2021 due to palpitations  Patient had a 2nd dose of COVID-19 injection Saturday, Sunday patient experienced skipped heartbeats for which he came into emergency department  EKG upon presentation showed elated PVCs, premature atrial contractions  Troponin, NT BNP, TSH was within normal limits Patient was admitted for observation and was placed on telemetry  Telemetry showed isolated premature ventricular contractions and premature atrial contractions, without tachyarrhythmias  Patient has no cardiac history  Creatinine upon presentation was 2, unclear patient baseline, no labs available in patient's chart  Creatinine slightly improved with IV fluids  Most likely patient has benign PVCs  Patient is stable for discharge, he will have a BMP in a few days and will follow-up with his PCP  Condition at Discharge: good     Discharge Day Visit / Exam:     Subjective:  Skipped beats  Vitals: Blood Pressure: 136/83 (02/22/21 0645)  Pulse: 70 (02/22/21 0645)  Temperature: 97 7 °F (36 5 °C) (02/21/21 1513)  Temp Source: Oral (02/21/21 1513)  Respirations: 18 (02/22/21 0545)  Weight - Scale: 85 3 kg (188 lb) (02/21/21 1513)  SpO2: 93 % (02/22/21 0645)  Exam:   Physical Exam  Vitals signs reviewed  Constitutional:       General: He is not in acute distress  Appearance: He is not ill-appearing, toxic-appearing or diaphoretic  HENT:      Head: Normocephalic and atraumatic  Eyes:      General: No scleral icterus  Right eye: No discharge  Left eye: No discharge  Extraocular Movements: Extraocular movements intact  Conjunctiva/sclera: Conjunctivae normal       Pupils: Pupils are equal, round, and reactive to light  Neck:      Musculoskeletal: Normal range of motion  Cardiovascular:      Rate and Rhythm: Normal rate and regular rhythm  Heart sounds: Normal heart sounds  No murmur  Pulmonary:      Effort: No respiratory distress  Breath sounds: Normal breath sounds  No wheezing, rhonchi or rales  Abdominal:      General: There is no distension  Palpations: Abdomen is soft  Tenderness: There is no abdominal tenderness  There is no right CVA tenderness, left CVA tenderness, guarding or rebound  Musculoskeletal:      Right lower leg: No edema  Left lower leg: No edema  Skin:     General: Skin is warm  Neurological:      Mental Status: He is alert and oriented to person, place, and time  Psychiatric:         Mood and Affect: Mood normal          Behavior: Behavior normal        Discussion with Family:  Discussed with the patient    Discharge instructions/Information to patient and family:   See after visit summary for information provided to patient and family  Provisions for Follow-Up Care:  See after visit summary for information related to follow-up care and any pertinent home health orders  Planned Readmission:  No     Discharge Medications:  See after visit summary for reconciled discharge medications provided to patient and family        ** Please Note: This note has been constructed using a voice recognition system **

## 2021-02-24 LAB
ATRIAL RATE: 78 BPM
ATRIAL RATE: 83 BPM
P AXIS: 195 DEGREES
P AXIS: 35 DEGREES
PR INTERVAL: 164 MS
PR INTERVAL: 192 MS
QRS AXIS: -1 DEGREES
QRS AXIS: -7 DEGREES
QRSD INTERVAL: 82 MS
QRSD INTERVAL: 84 MS
QT INTERVAL: 362 MS
QT INTERVAL: 376 MS
QTC INTERVAL: 412 MS
QTC INTERVAL: 441 MS
T WAVE AXIS: 2 DEGREES
T WAVE AXIS: 20 DEGREES
VENTRICULAR RATE: 78 BPM
VENTRICULAR RATE: 83 BPM

## 2021-02-24 PROCEDURE — 93010 ELECTROCARDIOGRAM REPORT: CPT | Performed by: INTERNAL MEDICINE

## 2021-05-06 ENCOUNTER — OFFICE VISIT (OUTPATIENT)
Dept: CARDIOLOGY CLINIC | Facility: CLINIC | Age: 76
End: 2021-05-06
Payer: MEDICARE

## 2021-05-06 VITALS
HEART RATE: 80 BPM | SYSTOLIC BLOOD PRESSURE: 120 MMHG | BODY MASS INDEX: 28.99 KG/M2 | DIASTOLIC BLOOD PRESSURE: 74 MMHG | HEIGHT: 69 IN | WEIGHT: 195.7 LBS | OXYGEN SATURATION: 95 %

## 2021-05-06 DIAGNOSIS — N18.32 STAGE 3B CHRONIC KIDNEY DISEASE (HCC): ICD-10-CM

## 2021-05-06 DIAGNOSIS — E66.3 OVERWEIGHT (BMI 25.0-29.9): ICD-10-CM

## 2021-05-06 DIAGNOSIS — I49.1 PREMATURE ATRIAL CONTRACTIONS: Primary | ICD-10-CM

## 2021-05-06 DIAGNOSIS — I10 ESSENTIAL HYPERTENSION: ICD-10-CM

## 2021-05-06 DIAGNOSIS — R00.2 PALPITATIONS: ICD-10-CM

## 2021-05-06 PROCEDURE — 99204 OFFICE O/P NEW MOD 45 MIN: CPT | Performed by: INTERNAL MEDICINE

## 2021-05-06 PROCEDURE — 93000 ELECTROCARDIOGRAM COMPLETE: CPT | Performed by: INTERNAL MEDICINE

## 2021-05-06 RX ORDER — CHOLECALCIFEROL (VITAMIN D3) 25 MCG
CAPSULE ORAL
COMMUNITY

## 2021-05-06 RX ORDER — GUAIFENESIN 600 MG
1200 TABLET, EXTENDED RELEASE 12 HR ORAL DAILY
COMMUNITY

## 2021-05-06 RX ORDER — DIPHENOXYLATE HYDROCHLORIDE AND ATROPINE SULFATE 2.5; .025 MG/1; MG/1
1 TABLET ORAL DAILY
COMMUNITY

## 2021-05-06 RX ORDER — ASPIRIN 81 MG/1
81 TABLET ORAL DAILY
COMMUNITY
End: 2021-05-28 | Stop reason: ALTCHOICE

## 2021-05-06 NOTE — PROGRESS NOTES
[address] CARDIOLOGY Estefany David Carrillo [facility] [address] [facility] [address]  Phone#  [phone]  Fax#  [phone]                                               Cardiology Office [address] [facility] Alecia Coley , 64 y o  male  YOB: 1945  MRN: [id number] Encounter: [id number]      PCP - Marlon Enter, DO    Assessment  1  PACs, Irregular heart beat  2  Hypertension  3  CKD  4  Overweight, Body mass index is 28 9 kg/m²  Plan  PACs, irregular heart beat  · No major palpitations, but was mostly incidentally noted while monitoring for hypoxia on pulse oximeter after COVID vaccination  · ECG today again shows some PACs, and possible wandering atrial pacemaker, but there is no diagnostic evidence of atrial fibrillation  · Will check a 48 hour Holter monitor to assess burden of PACs, and evaluate for atrial fibrillation as well as bradycardia   · If having frequent PACs, then consider switching amlodipine to metoprolol succinate  · If frequent PACs or any other arrhythmias, consider further stress testing    Hypertension  · Blood pressure today 120/74   · Currently on amlodipine 5 mg   · Seems to be fairly well controlled   · Continue same for now   · If does have frequent PACs, arrhythmias or palpitations, then consider switching to metoprolol succinate 25 mg bid    CKD  · Unknown baseline, as prior labs done through PCP are unavailable  · Cr recently was 1 9-2 in the ED  · Monitor for now    Hyperlipidemia, overweight - Body mass index is 28 9 kg/m²     · Lipid panel 02/21/2021 showed total cholesterol 189, triglycerides 206, HDL 43,    · Triglycerides mildly elevated likely related to dietary indiscretions  · Counseled regarding low carb, low-fat diet  · Increase exercise    ECG today -  Results for orders placed or performed in visit on 05/06/21   POCT ECG    Impression    Sinus rhythm with PACs, with possible wandering atrial pacemaker        Orders Placed This Encounter Procedures    Holter monitor - 48 hour    POCT ECG       Return in about 2 months (around 7/6/2021), or if symptoms worsen or fail to improve  [place] [facility] [address] [place], and prefers to follow-up their  Will make arrangements for Holter monitor and follow-up at the [place] [address] office    History of Present Illness   64 y o  male comes in as a new patient for evaluation of irregular heartbeat  He is in his usual state of health, and does not report any major symptoms of chest pain, palpitations, shortness of breath  He recently received his COVID vaccination, and after this was monitoring his pulse ox, and at this time he incidentally noted some irregular heartbeat  He continued to have some irregular heartbeats, and as a result went to the ED for evaluation  In the ED, he was noted to have sinus rhythm with PACs on ECG, and was otherwise monitored overnight and discharged  Since discharge, he continues to be symptom free and has not had any major episodes of heart racing or palpitations  He states that his heart rate goes up to 120 with activity  No history of dizziness, near-syncope or syncope        Historical Information   Past Medical History:   Diagnosis Date    Asthma     Hypertension      Past Surgical History:   Procedure Laterality Date    CYST REMOVAL      TONSILLECTOMY       Family History   Problem Relation Age of Onset    Diabetes Mother     No Known Problems Father      Current Outpatient Medications on File Prior to Visit   Medication Sig Dispense Refill    amLODIPine (NORVASC) 5 mg tablet   0    aspirin (ECOTRIN LOW STRENGTH) 81 mg EC tablet Take 1 tablet (81 mg total) by mouth daily 30 tablet 0    aspirin (ECOTRIN LOW STRENGTH) 81 mg EC tablet Take 81 mg by mouth daily      Cholecalciferol (Vitamin D-3) 25 MCG (1000 UT) CAPS Take by mouth      guaiFENesin (MUCINEX) 600 mg 12 hr tablet Take 1,200 mg by mouth daily      multivitamin (THERAGRAN) TABS Take 1 tablet by mouth daily      omeprazole (PriLOSEC) 10 mg delayed release capsule Take 10 mg by mouth daily      tamsulosin (FLOMAX) 0 4 mg Take 1 capsule (0 4 mg total) by mouth daily with dinner 30 capsule 0    clobetasol (TEMOVATE) 0 05 % cream APPLY UP TO TWICE DAILY TO HANDS FOR 2 WEEKS ON THEN 1 WEEK OFF AS NEEDED   2     No current facility-administered medications on file prior to visit  Allergies   Allergen Reactions    Chlorpheniramine Other (See Comments)    Phenylephrine Other (See Comments)     Social History     Socioeconomic History    Marital status: /Civil Union     Spouse name: None    Number of children: None    Years of education: None    Highest education level: None   Occupational History    None   Social Needs    Financial resource strain: None    Food insecurity     Worry: None     Inability: None    Transportation needs     Medical: None     Non-medical: None   Tobacco Use    Smoking status: Never Smoker    Smokeless tobacco: Never Used   Substance and Sexual Activity    Alcohol use: Yes     Comment: social    Drug use: None    Sexual activity: Never   Lifestyle    Physical activity     Days per week: None     Minutes per session: None    Stress: None   Relationships    Social connections     Talks on phone: None     Gets together: None     Attends Pentecostalism service: None     Active member of club or organization: None     Attends meetings of clubs or organizations: None     Relationship status: None    Intimate partner violence     Fear of current or ex partner: None     Emotionally abused: None     Physically abused: None     Forced sexual activity: None   Other Topics Concern    None   Social History Narrative    None        Review of Systems   All other systems reviewed and are negative        Vitals:  Vitals:    05/06/21 1255   BP: 120/74   BP Location: Right arm   Patient Position: Sitting   Cuff Size: Large   Pulse: 80   SpO2: 95%   Weight: 88 8 kg (195 lb 11 2 oz)   Height: 5' 9" (1 753 m)     BMI - Body mass index is 28 9 kg/m²  Wt Readings from Last 7 Encounters:   05/06/21 88 8 kg (195 lb 11 2 oz)   02/21/21 85 3 kg (188 lb)   11/19/19 91 2 kg (201 lb)   09/18/19 90 3 kg (199 lb)   08/07/19 88 9 kg (196 lb)   07/10/19 89 8 kg (198 lb)   06/26/19 89 8 kg (198 lb)       Physical Exam  Vitals signs and nursing note reviewed  Constitutional:       General: He is not in acute distress  Appearance: Normal appearance  He is well-developed  He is not ill-appearing or diaphoretic  HENT:      Head: Normocephalic and atraumatic  Nose: No congestion  Eyes:      General: No scleral icterus  Conjunctiva/sclera: Conjunctivae normal    Neck:      Musculoskeletal: Neck supple  No muscular tenderness  Vascular: No carotid bruit or JVD  Cardiovascular:      Rate and Rhythm: Normal rate and regular rhythm  Heart sounds: Normal heart sounds  No murmur  No friction rub  No gallop  Pulmonary:      Effort: Pulmonary effort is normal  No respiratory distress  Breath sounds: Normal breath sounds  No wheezing or rales  Chest:      Chest wall: No tenderness  Abdominal:      General: There is no distension  Palpations: Abdomen is soft  Tenderness: There is no abdominal tenderness  Musculoskeletal:         General: No swelling, tenderness or deformity  Right lower leg: No edema  Left lower leg: No edema  Skin:     General: Skin is warm  Neurological:      General: No focal deficit present  Mental Status: He is alert and oriented to person, place, and time  Mental status is at baseline  Psychiatric:         Mood and Affect: Mood normal          Behavior: Behavior normal          Thought Content:  Thought content normal          Labs:  CBC:   Lab Results   Component Value Date    WBC 6 35 02/22/2021    RBC 3 71 (L) 02/22/2021    HGB 11 5 (L) 02/22/2021    HCT 35 3 (L) 02/22/2021    MCV 95 02/22/2021     02/22/2021    RDW 12 8 02/22/2021       CMP:   Lab Results   Component Value Date    K 3 8 02/22/2021     02/22/2021    CO2 28 02/22/2021    BUN 24 02/22/2021    CREATININE 1 96 (H) 02/22/2021    EGFR 32 02/22/2021    CALCIUM 8 6 02/22/2021    AST 13 02/21/2021    ALT 21 02/21/2021    ALKPHOS 74 02/21/2021       Magnesium:  No results found for: MG    Lipid Profile:   Lab Results   Component Value Date    HDL 43 02/21/2021    TRIG 206 (H) 02/21/2021    LDLCALC 105 (H) 02/21/2021       Thyroid Studies:   Lab Results   Component Value Date    [id number] 2 008 02/21/2021       No components found for: HGA1C    No results found for: TJB6    Imaging: No results found  Cardiac testing:   No results found for this or any previous visit  No results found for this or any previous visit  No results found for this or any previous visit  No results found for this or any previous visit

## 2021-05-11 ENCOUNTER — HOSPITAL ENCOUNTER (OUTPATIENT)
Dept: NON INVASIVE DIAGNOSTICS | Facility: CLINIC | Age: 76
Discharge: HOME | End: 2021-05-11
Payer: MEDICARE

## 2021-05-11 DIAGNOSIS — I49.1 PREMATURE ATRIAL CONTRACTIONS: ICD-10-CM

## 2021-05-11 DIAGNOSIS — R00.2 PALPITATIONS: ICD-10-CM

## 2021-05-11 PROCEDURE — 93225 XTRNL ECG REC<48 HRS REC: CPT

## 2021-05-11 PROCEDURE — 93226 XTRNL ECG REC<48 HR SCAN A/R: CPT

## 2021-05-28 DIAGNOSIS — I48.0 PAROXYSMAL ATRIAL FIBRILLATION (HCC): ICD-10-CM

## 2021-05-28 DIAGNOSIS — R00.2 PALPITATIONS: Primary | ICD-10-CM

## 2021-05-28 PROCEDURE — 93227 XTRNL ECG REC<48 HR R&I: CPT | Performed by: INTERNAL MEDICINE

## 2021-05-28 RX ORDER — METOPROLOL SUCCINATE 50 MG/1
50 TABLET, EXTENDED RELEASE ORAL DAILY
Qty: 90 TABLET | Refills: 0 | Status: SHIPPED | OUTPATIENT
Start: 2021-05-28 | End: 2021-08-30

## 2021-05-28 NOTE — TELEPHONE ENCOUNTER
----- Message from Joann Restrepo MD sent at 5/28/2021 10:16 AM EDT -----  Evidence of atrial fibrillation for about 3-4 hours  I suggest switching amlodipine to metoprolol succinate 50 mg daily  Also, suggest initiating eliquis 5 mg bid  Follow up earlier in office to discuss this further

## 2021-06-02 DIAGNOSIS — I48.0 PAROXYSMAL ATRIAL FIBRILLATION (HCC): Primary | ICD-10-CM

## 2021-06-21 ENCOUNTER — OFFICE VISIT (OUTPATIENT)
Dept: CARDIOLOGY CLINIC | Facility: CLINIC | Age: 76
End: 2021-06-21
Payer: MEDICARE

## 2021-06-21 VITALS
WEIGHT: 195 LBS | DIASTOLIC BLOOD PRESSURE: 68 MMHG | SYSTOLIC BLOOD PRESSURE: 110 MMHG | BODY MASS INDEX: 28.88 KG/M2 | HEIGHT: 69 IN | HEART RATE: 68 BPM

## 2021-06-21 DIAGNOSIS — I10 ESSENTIAL HYPERTENSION: ICD-10-CM

## 2021-06-21 DIAGNOSIS — E78.2 MIXED HYPERLIPIDEMIA: ICD-10-CM

## 2021-06-21 DIAGNOSIS — E66.3 OVERWEIGHT (BMI 25.0-29.9): ICD-10-CM

## 2021-06-21 DIAGNOSIS — N18.32 STAGE 3B CHRONIC KIDNEY DISEASE (HCC): ICD-10-CM

## 2021-06-21 DIAGNOSIS — I48.0 PAROXYSMAL ATRIAL FIBRILLATION (HCC): Primary | ICD-10-CM

## 2021-06-21 DIAGNOSIS — G47.33 OSA (OBSTRUCTIVE SLEEP APNEA): ICD-10-CM

## 2021-06-21 DIAGNOSIS — N18.31 STAGE 3A CHRONIC KIDNEY DISEASE (HCC): ICD-10-CM

## 2021-06-21 PROCEDURE — 99214 OFFICE O/P EST MOD 30 MIN: CPT | Performed by: INTERNAL MEDICINE

## 2021-06-21 NOTE — PROGRESS NOTES
[facility] [place] [address]  Phone#  [phone]  Fax#  [phone]                                               Cardiology Office Follow up  Jocelyn Castro , 64 y o  male  YOB: 1945  MRN: [id number] Encounter: [id number]      PCP - Hang Corbett,     Assessment  1  Paroxysmal Atrial Fibrillation   2  PACs, Irregular heart beat  3  Hypertension  4  CKD  5  Overweight, Body mass index is 28 8 kg/m²  Plan  Paroxysmal Atrial Fibrillation, Frequent PACs, palpitations  · Noted to have atrial fibrillation for 3-4 hours on holter monitor in 5/2021, was otherwise asymptomatic  · I started him on metoprolol succinate 50 mg daily, xarelto 15 mg daily, few weeks ago  · But he was having complains of slow resting heart rate in the low 50s, and as a result he had contacted to me through [employer] about it, and I had asked him to decrease metoprolol XL to 25 mg, if he felt fatigued or dizzy  · But instead, he thinks he decreased his Xarelto to half a tablet instead, but he is not sure   · I have asked him to verify this at home, and instructed him that the correct dose of Xarelto for him is 15 mg daily, and he should be taking same  · Check exercise stress echo, home sleep study  · Counseled to minimize caffeine (narendra Cordova)    Hypertension  · Blood pressure today 120/74   · Amlodipine discontinued, and he was placed on metoprolol succinate due to new diagnosis of AFib/frequent PACs  · Continue same for now     CKD  · Unknown baseline, as prior labs done through PCP are unavailable  · Cr recently was 1 9-2 in the ED  · Repeat BMP    Hyperlipidemia, overweight - Body mass index is 28 8 kg/m²      2/21/2021 15:25   Cholesterol 189   Triglycerides 206 (H)   HDL 43   LDL Calculated 105 (H)     · Triglycerides mildly elevated likely related to dietary indiscretions  · Counseled regarding low carb, low-fat diet  ·  repeat lipid panel  ·  weight loss of 10-15 lb suggested    ECG today -  No results found for this visit on 06/21/21  Orders Placed This Encounter   Procedures    Basic metabolic panel    Lipid Panel with Direct LDL reflex    Echo stress test w contrast if indicated    Home Study       Return in about 4 months (around 10/21/2021), or if symptoms worsen or fail to improve  He lives in the [facility] area, and prefers to follow-up their  Will make arrangements for Holter monitor and follow-up at the [facility] office    History of Present Illness   64 y o  male comes in as a new patient for evaluation of irregular heartbeat  He is in his usual state of health, and does not report any major symptoms of chest pain, palpitations, shortness of breath  He recently received his COVID vaccination, and after this was monitoring his pulse ox, and at this time he incidentally noted some irregular heartbeat  He continued to have some irregular heartbeats, and as a result went to the ED for evaluation  In the ED, he was noted to have sinus rhythm with PACs on ECG, and was otherwise monitored overnight and discharged  Since discharge, he continues to be symptom free and has not had any major episodes of heart racing or palpitations  He states that his heart rate goes up to 120 with activity  No history of dizziness, near-syncope or syncope        Historical Information   Past Medical History:   Diagnosis Date    Asthma     Hypertension      Past Surgical History:   Procedure Laterality Date    CYST REMOVAL      TONSILLECTOMY       Family History   Problem Relation Age of Onset    Diabetes Mother     No Known Problems Father      Current Outpatient Medications on File Prior to Visit   Medication Sig Dispense Refill    Cholecalciferol (Vitamin D-3) 25 MCG (1000 UT) CAPS Take by mouth      guaiFENesin (MUCINEX) 600 mg 12 hr tablet Take 1,200 mg by mouth daily      metoprolol succinate (TOPROL-XL) 50 mg 24 hr tablet Take 1 tablet (50 mg total) by mouth daily 90 tablet 0    multivitamin (THERAGRAN) TABS Take 1 tablet by mouth daily      omeprazole (PriLOSEC) 10 mg delayed release capsule Take 10 mg by mouth daily      rivaroxaban (XARELTO) 15 mg tablet Take 1 tablet (15 mg total) by mouth daily with dinner 90 tablet 1    tamsulosin (FLOMAX) 0 4 mg Take 1 capsule (0 4 mg total) by mouth daily with dinner 30 capsule 0    [DISCONTINUED] clobetasol (TEMOVATE) 0 05 % cream APPLY UP TO TWICE DAILY TO HANDS FOR 2 WEEKS ON THEN 1 WEEK OFF AS NEEDED   2     No current facility-administered medications on file prior to visit  Allergies   Allergen Reactions    Chlorpheniramine Other (See Comments)    Phenylephrine Other (See Comments)     Social History     Socioeconomic History    Marital status: /Civil Union     Spouse name: None    Number of children: None    Years of education: None    Highest education level: None   Occupational History    None   Tobacco Use    Smoking status: Never Smoker    Smokeless tobacco: Never Used   Vaping Use    Vaping Use: Never used   Substance and Sexual Activity    Alcohol use: Yes     Comment: social    Drug use: None    Sexual activity: Never   Other Topics Concern    None   Social History Narrative    None     Social Determinants of Health     Financial Resource Strain:     Difficulty of Paying Living Expenses:    Food Insecurity:     Worried About Running Out of Food in the Last Year:     Ran Out of Food in the Last Year:    Transportation Needs:     Lack of Transportation (Medical):      Lack of Transportation (Non-Medical):    Physical Activity:     Days of Exercise per Week:     Minutes of Exercise per Session:    Stress:     Feeling of Stress :    Social Connections:     Frequency of Communication with Friends and Family:     Frequency of Social Gatherings with Friends and Family:     Attends Yazidi Services:     Active Member of Clubs or Organizations:     Attends Club or Organization Meetings:     Marital Status:    Intimate Partner Violence:     Fear of Current or Ex-Partner:     Emotionally Abused:     Physically Abused:     Sexually Abused:         Review of Systems   All other systems reviewed and are negative  Vitals:  Vitals:    06/21/21 1524   BP: 110/68   Pulse: 68   Weight: 88 5 kg (195 lb)   Height: 5' 9" (1 753 m)     BMI - Body mass index is 28 8 kg/m²  Wt Readings from Last 7 Encounters:   06/21/21 88 5 kg (195 lb)   05/06/21 88 8 kg (195 lb 11 2 oz)   02/21/21 85 3 kg (188 lb)   11/19/19 91 2 kg (201 lb)   09/18/19 90 3 kg (199 lb)   08/07/19 88 9 kg (196 lb)   07/10/19 89 8 kg (198 lb)       Physical Exam  Vitals and nursing note reviewed  Constitutional:       General: He is not in acute distress  Appearance: Normal appearance  He is well-developed  He is not ill-appearing or diaphoretic  HENT:      Head: Normocephalic and atraumatic  Nose: No congestion  Eyes:      General: No scleral icterus  Conjunctiva/sclera: Conjunctivae normal    Neck:      Vascular: No carotid bruit or JVD  Cardiovascular:      Rate and Rhythm: Normal rate and regular rhythm  Heart sounds: Normal heart sounds  No murmur heard  No friction rub  No gallop  Pulmonary:      Effort: Pulmonary effort is normal  No respiratory distress  Breath sounds: Normal breath sounds  No wheezing or rales  Chest:      Chest wall: No tenderness  Abdominal:      General: There is no distension  Palpations: Abdomen is soft  Tenderness: There is no abdominal tenderness  Musculoskeletal:         General: No swelling, tenderness or deformity  Cervical back: Neck supple  No muscular tenderness  Right lower leg: No edema  Left lower leg: No edema  Skin:     General: Skin is warm  Neurological:      General: No focal deficit present  Mental Status: He is alert and oriented to person, place, and time   Mental status is at baseline  Psychiatric:         Mood and Affect: Mood normal          Behavior: Behavior normal          Thought Content: Thought content normal          Labs:  CBC:   Lab Results   Component Value Date    WBC 6 35 02/22/2021    RBC 3 71 (L) 02/22/2021    HGB 11 5 (L) 02/22/2021    HCT 35 3 (L) 02/22/2021    MCV 95 02/22/2021     02/22/2021    RDW 12 8 02/22/2021       CMP:   Lab Results   Component Value Date    K 3 8 02/22/2021     02/22/2021    CO2 28 02/22/2021    BUN 24 02/22/2021    CREATININE 1 96 (H) 02/22/2021    EGFR 32 02/22/2021    CALCIUM 8 6 02/22/2021    AST 13 02/21/2021    ALT 21 02/21/2021    ALKPHOS 74 02/21/2021       Magnesium:  No results found for: MG    Lipid Profile:   Lab Results   Component Value Date    HDL 43 02/21/2021    TRIG 206 (H) 02/21/2021    LDLCALC 105 (H) 02/21/2021       Thyroid Studies:   Lab Results   Component Value Date    [id number] 2 008 02/21/2021       No components found for: HGA1C    No results found for: SYI0    Imaging: No results found  Cardiac testing:   No results found for this or any previous visit  No results found for this or any previous visit  No results found for this or any previous visit  No results found for this or any previous visit

## 2021-06-23 ENCOUNTER — TELEPHONE (OUTPATIENT)
Dept: SLEEP CENTER | Facility: CLINIC | Age: 76
End: 2021-06-23

## 2021-06-23 NOTE — TELEPHONE ENCOUNTER
----- Message from Karina Lim MD sent at 6/22/2021 10:19 PM EDT -----  OK  ----- Message -----  From: Yayo Dumont  Sent: 6/22/2021  10:10 AM EDT  To: Sleep Medicine [facility] Provider    This sleep study needs approval      If approved please sign and return to clerical pool  If denied please include reasons why  Also provide alternative testing if warranted  Please sign and return to clerical pool  Refer to HPI

## 2021-06-23 NOTE — TELEPHONE ENCOUNTER
----- Message from Basil Null MD sent at 6/22/2021 10:19 PM EDT -----  OK  ----- Message -----  From: Jerome Huff  Sent: 6/22/2021  10:10 AM EDT  To: Sleep Medicine [facility] Provider    This sleep study needs approval      If approved please sign and return to clerical pool  If denied please include reasons why  Also provide alternative testing if warranted  Please sign and return to clerical pool

## 2021-07-12 LAB
BUN SERPL-MCNC: 23 MG/DL (ref 8–27)
BUN/CREAT SERPL: 11 (ref 10–24)
CHLORIDE SERPL-SCNC: 102 MMOL/L (ref 96–106)
CHOLEST SERPL-MCNC: 200 MG/DL (ref 100–199)
CO2 SERPL-SCNC: 23 MMOL/L (ref 20–29)
CREAT SERPL-MCNC: 2.06 MG/DL (ref 0.76–1.27)
GLUCOSE SERPL-MCNC: 96 MG/DL (ref 65–99)
HDLC SERPL-MCNC: 37 MG/DL
LDLC SERPL CALC-MCNC: 141 MG/DL (ref 0–99)
POTASSIUM SERPL-SCNC: 4.2 MMOL/L (ref 3.5–5.2)
SL AMB EGFR AFRICAN AMERICAN: 35 ML/MIN/1.73
SL AMB EGFR NON AFRICAN AMERICAN: 30 ML/MIN/1.73
SL AMB VLDL CHOLESTEROL CALC: 22 MG/DL (ref 5–40)
SODIUM SERPL-SCNC: 139 MMOL/L (ref 134–144)
TRIGL SERPL-MCNC: 123 MG/DL (ref 0–149)

## 2021-07-19 ENCOUNTER — HOSPITAL ENCOUNTER (OUTPATIENT)
Dept: NON INVASIVE DIAGNOSTICS | Facility: CLINIC | Age: 76
Discharge: HOME | End: 2021-07-19
Payer: MEDICARE

## 2021-07-19 DIAGNOSIS — E66.3 OVERWEIGHT (BMI 25.0-29.9): ICD-10-CM

## 2021-07-19 DIAGNOSIS — I10 ESSENTIAL HYPERTENSION: ICD-10-CM

## 2021-07-19 DIAGNOSIS — I48.0 PAROXYSMAL ATRIAL FIBRILLATION (HCC): ICD-10-CM

## 2021-07-19 PROCEDURE — 93350 STRESS TTE ONLY: CPT

## 2021-07-19 PROCEDURE — 93351 STRESS TTE COMPLETE: CPT | Performed by: INTERNAL MEDICINE

## 2021-07-29 LAB
CHEST PAIN STATEMENT: NORMAL
MAX DIASTOLIC BP: 94 MMHG
MAX HEART RATE: 133 BPM
MAX PREDICTED HEART RATE: 142 BPM
MAX. SYSTOLIC BP: 156 MMHG
PROTOCOL NAME: NORMAL
REASON FOR TERMINATION: NORMAL
TARGET HR FORMULA: NORMAL
TEST INDICATION: NORMAL
TIME IN EXERCISE PHASE: NORMAL

## 2021-08-27 ENCOUNTER — HOSPITAL ENCOUNTER (EMERGENCY)
Facility: HOSPITAL | Age: 76
Discharge: HOME | End: 2021-08-27
Attending: EMERGENCY MEDICINE
Payer: MEDICARE

## 2021-08-27 VITALS
RESPIRATION RATE: 17 BRPM | OXYGEN SATURATION: 91 % | DIASTOLIC BLOOD PRESSURE: 87 MMHG | SYSTOLIC BLOOD PRESSURE: 141 MMHG | TEMPERATURE: 98.1 F | HEART RATE: 72 BPM

## 2021-08-27 DIAGNOSIS — I48.91 ATRIAL FIBRILLATION (HCC): Primary | ICD-10-CM

## 2021-08-27 LAB
ALBUMIN SERPL BCP-MCNC: 3.8 G/DL (ref 3.5–5)
ALP SERPL-CCNC: 64 U/L (ref 46–116)
ALT SERPL W P-5'-P-CCNC: 19 U/L (ref 12–78)
ANION GAP SERPL CALCULATED.3IONS-SCNC: 12 MMOL/L (ref 4–13)
AST SERPL W P-5'-P-CCNC: 17 U/L (ref 5–45)
ATRIAL RATE: 72 BPM
BASOPHILS # BLD AUTO: 0.03 THOUSANDS/ΜL (ref 0–0.1)
BASOPHILS NFR BLD AUTO: 1 % (ref 0–1)
BILIRUB SERPL-MCNC: 0.34 MG/DL (ref 0.2–1)
BUN SERPL-MCNC: 25 MG/DL (ref 5–25)
CALCIUM SERPL-MCNC: 8.8 MG/DL (ref 8.3–10.1)
CHLORIDE SERPL-SCNC: 107 MMOL/L (ref 100–108)
CO2 SERPL-SCNC: 23 MMOL/L (ref 21–32)
CREAT SERPL-MCNC: 2.01 MG/DL (ref 0.6–1.3)
EOSINOPHIL # BLD AUTO: 0.6 THOUSAND/ΜL (ref 0–0.61)
EOSINOPHIL NFR BLD AUTO: 10 % (ref 0–6)
ERYTHROCYTE [DISTWIDTH] IN BLOOD BY AUTOMATED COUNT: 12.6 % (ref 11.6–15.1)
GFR SERPL CREATININE-BSD FRML MDRD: 31 ML/MIN/1.73SQ M
GLUCOSE SERPL-MCNC: 115 MG/DL (ref 65–140)
HCT VFR BLD AUTO: 36 % (ref 36.5–49.3)
HGB BLD-MCNC: 12.1 G/DL (ref 12–17)
IMM GRANULOCYTES # BLD AUTO: 0.01 THOUSAND/UL (ref 0–0.2)
IMM GRANULOCYTES NFR BLD AUTO: 0 % (ref 0–2)
INR PPP: 1.01 (ref 0.84–1.19)
LYMPHOCYTES # BLD AUTO: 1.56 THOUSANDS/ΜL (ref 0.6–4.47)
LYMPHOCYTES NFR BLD AUTO: 26 % (ref 14–44)
MCH RBC QN AUTO: 31 PG (ref 26.8–34.3)
MCHC RBC AUTO-ENTMCNC: 33.6 G/DL (ref 31.4–37.4)
MCV RBC AUTO: 92 FL (ref 82–98)
MONOCYTES # BLD AUTO: 0.45 THOUSAND/ΜL (ref 0.17–1.22)
MONOCYTES NFR BLD AUTO: 7 % (ref 4–12)
NEUTROPHILS # BLD AUTO: 3.42 THOUSANDS/ΜL (ref 1.85–7.62)
NEUTS SEG NFR BLD AUTO: 56 % (ref 43–75)
NRBC BLD AUTO-RTO: 0 /100 WBCS
P AXIS: 38 DEGREES
PLATELET # BLD AUTO: 174 THOUSANDS/UL (ref 149–390)
PMV BLD AUTO: 9.5 FL (ref 8.9–12.7)
POTASSIUM SERPL-SCNC: 3.6 MMOL/L (ref 3.5–5.3)
PR INTERVAL: 166 MS
PROT SERPL-MCNC: 7.5 G/DL (ref 6.4–8.2)
PROTHROMBIN TIME: 13.4 SECONDS (ref 11.6–14.5)
QRS AXIS: -9 DEGREES
QRSD INTERVAL: 96 MS
QT INTERVAL: 378 MS
QTC INTERVAL: 413 MS
RBC # BLD AUTO: 3.9 MILLION/UL (ref 3.88–5.62)
SODIUM SERPL-SCNC: 142 MMOL/L (ref 136–145)
T WAVE AXIS: 11 DEGREES
TSH SERPL DL<=0.05 MIU/L-ACNC: 1.71 UIU/ML (ref 0.36–3.74)
VENTRICULAR RATE: 72 BPM
WBC # BLD AUTO: 6.07 THOUSAND/UL (ref 4.31–10.16)

## 2021-08-27 PROCEDURE — 93005 ELECTROCARDIOGRAM TRACING: CPT

## 2021-08-27 PROCEDURE — 85025 COMPLETE CBC W/AUTO DIFF WBC: CPT | Performed by: EMERGENCY MEDICINE

## 2021-08-27 PROCEDURE — 99285 EMERGENCY DEPT VISIT HI MDM: CPT

## 2021-08-27 PROCEDURE — 99285 EMERGENCY DEPT VISIT HI MDM: CPT | Performed by: EMERGENCY MEDICINE

## 2021-08-27 PROCEDURE — 36415 COLL VENOUS BLD VENIPUNCTURE: CPT | Performed by: EMERGENCY MEDICINE

## 2021-08-27 PROCEDURE — 93010 ELECTROCARDIOGRAM REPORT: CPT | Performed by: INTERNAL MEDICINE

## 2021-08-27 PROCEDURE — 85610 PROTHROMBIN TIME: CPT | Performed by: EMERGENCY MEDICINE

## 2021-08-27 PROCEDURE — 84443 ASSAY THYROID STIM HORMONE: CPT | Performed by: EMERGENCY MEDICINE

## 2021-08-27 PROCEDURE — 80053 COMPREHEN METABOLIC PANEL: CPT | Performed by: EMERGENCY MEDICINE

## 2021-08-27 NOTE — ED PROVIDER NOTES
History  Chief Complaint   Patient presents with    Rapid Heart Rate     Pt arrives via EMS c/o sudden onset feeling "uneasy"  Denies dizziness, SOB or weakness at that time or now  States he looked at his fitbit and noted his heartrate was 165  Hx paroxysmal a-fib, on xarelto  Pt took 2 baby aspirin and evening metoprolol dose, upon EMS arrival pt was in SR  Arrives in ER SR in 70's, denies CP or SOB     HPI     12-year-old male with history of hypertension, asthma, paroxysmal atrial fibrillation on Xarelto and metoprolol, presenting for evaluation of heart rate to 165  Patient states that he was at home relaxing before dinner when he began to feel [employer]    He lifted his Fitbit and noticed that his heart rate was 165  He denies dizziness, weakness, or chest pain at the time  He states that he became anxious when he saw that his heart rate was 165 in became breathless but denies feeling persistently short of breath or any pain associated with breathing  He took 2 baby aspirin and his nighttime dose of metoprolol prior to arrival   He now states that he is completely asymptomatic  Of note, patient recently decreased his home dose of metoprolol from 50 milligrams to 25 milligrams due to fatigue and bradycardia to 40 beats per minute at a 50 milligram dose  Prior to Admission Medications   Prescriptions Last Dose Informant Patient Reported? Taking?    Cholecalciferol (Vitamin D-3) 25 MCG (1000 UT) CAPS   Yes No   Sig: Take by mouth   guaiFENesin (MUCINEX) 600 mg 12 hr tablet   Yes No   Sig: Take 1,200 mg by mouth daily   metoprolol succinate (TOPROL-XL) 50 mg 24 hr tablet   No No   Sig: Take 1 tablet (50 mg total) by mouth daily   multivitamin (THERAGRAN) TABS   Yes No   Sig: Take 1 tablet by mouth daily   omeprazole (PriLOSEC) 10 mg delayed release capsule  Self Yes No   Sig: Take 10 mg by mouth daily   rivaroxaban (XARELTO) 15 mg tablet   No No   Sig: Take 1 tablet (15 mg total) by mouth daily with dinner tamsulosin (FLOMAX) 0 4 mg  Self No No   Sig: Take 1 capsule (0 4 mg total) by mouth daily with dinner      Facility-Administered Medications: None       Past Medical History:   Diagnosis Date    Asthma     Hypertension     Paroxysmal A-fib (HCC)        Past Surgical History:   Procedure Laterality Date    CYST REMOVAL      TONSILLECTOMY         Family History   Problem Relation Age of Onset    Diabetes Mother     No Known Problems Father      I have reviewed and agree with the history as documented  E-Cigarette/Vaping    E-Cigarette Use Never User      E-Cigarette/Vaping Substances    Nicotine No     THC No     CBD No     Flavoring No     Other No     Unknown No      Social History     Tobacco Use    Smoking status: Never Smoker    Smokeless tobacco: Never Used   Vaping Use    Vaping Use: Never used   Substance Use Topics    Alcohol use: Yes     Comment: social    Drug use: Never       Review of Systems   Constitutional: Negative for chills and fever  "Uneasy feeling," resolved   HENT: Negative for congestion  Eyes: Negative for visual disturbance  Respiratory: Negative for cough and shortness of breath  Cardiovascular: Negative for chest pain, palpitations and leg swelling  Gastrointestinal: Negative for abdominal pain, diarrhea, nausea and vomiting  Genitourinary: Negative for dysuria and frequency  Musculoskeletal: Negative for arthralgias, back pain, neck pain and neck stiffness  Skin: Negative for rash  Neurological: Negative for weakness, numbness and headaches  Psychiatric/Behavioral: Negative for agitation, behavioral problems and confusion  Physical Exam  Physical Exam  Constitutional:       General: He is not in acute distress  Appearance: He is well-developed  He is not diaphoretic  HENT:      Head: Normocephalic and atraumatic        Right Ear: External ear normal       Left Ear: External ear normal       Nose: Nose normal    Eyes: Conjunctiva/sclera: Conjunctivae normal    Cardiovascular:      Rate and Rhythm: Normal rate and regular rhythm  Pulses: Normal pulses  Heart sounds: Normal heart sounds  No murmur heard  No friction rub  No gallop  Pulmonary:      Effort: Pulmonary effort is normal  No respiratory distress  Breath sounds: Normal breath sounds  No wheezing or rales  Abdominal:      General: Bowel sounds are normal  There is no distension  Palpations: Abdomen is soft  Tenderness: There is no abdominal tenderness  There is no guarding  Musculoskeletal:         General: No deformity  Normal range of motion  Cervical back: Normal range of motion and neck supple  Comments: No calf swelling or tenderness   Skin:     General: Skin is warm and dry  Neurological:      Mental Status: He is alert and oriented to person, place, and time  Motor: No abnormal muscle tone  Psychiatric:         Mood and Affect: Mood normal          Vital Signs  ED Triage Vitals [08/27/21 1915]   Temperature Pulse Respirations Blood Pressure SpO2   98 1 °F (36 7 °C) 72 16 137/82 96 %      Temp Source Heart Rate Source Patient Position - Orthostatic VS BP Location FiO2 (%)   Oral Monitor -- -- --      Pain Score       --           Vitals:    08/27/21 1915 08/27/21 1930   BP: 137/82 141/87   Pulse: 72 72         Visual Acuity  Visual Acuity      Most Recent Value   L Pupil Size (mm)  3   R Pupil Size (mm)  3          ED Medications  Medications - No data to display    Diagnostic Studies  Results Reviewed     Procedure Component Value Units Date/Time    TSH [[id number]]  (Normal) Collected: 08/27/21 1929    Lab Status: Final result Specimen: Blood from Arm, Left Updated: 08/27/21 2014     TSH 3RD [facility] 1 709 uIU/mL     Narrative:      Patients undergoing fluorescein dye angiography may retain small amounts of fluorescein in the body for 48-72 hours post procedure   Samples containing fluorescein can produce falsely depressed TSH values  If the patient had this procedure,a specimen should be resubmitted post fluorescein clearance        Comprehensive metabolic panel [[id number]]  (Abnormal) Collected: 08/27/21 1929    Lab Status: Final result Specimen: Blood from Arm, Left Updated: 08/27/21 2009     Sodium 142 mmol/L      Potassium 3 6 mmol/L      Chloride 107 mmol/L      CO2 23 mmol/L      ANION GAP 12 mmol/L      BUN 25 mg/dL      Creatinine 2 01 mg/dL      Glucose 115 mg/dL      Calcium 8 8 mg/dL      AST 17 U/L      ALT 19 U/L      Alkaline Phosphatase 64 U/L      Total Protein 7 5 g/dL      Albumin 3 8 g/dL      Total Bilirubin 0 34 mg/dL      eGFR 31 ml/min/1 73sq m     Narrative:      National Kidney Disease Foundation guidelines for Chronic Kidney Disease (CKD):     Stage 1 with normal or high GFR (GFR > 90 mL/min/1 73 square meters)    Stage 2 Mild CKD (GFR = 60-89 mL/min/1 73 square meters)    Stage 3A Moderate CKD (GFR = 45-59 mL/min/1 73 square meters)    Stage 3B Moderate CKD (GFR = 30-44 mL/min/1 73 square meters)    Stage 4 Severe CKD (GFR = 15-29 mL/min/1 73 square meters)    Stage 5 End Stage CKD (GFR <15 mL/min/1 73 square meters)  Note: GFR calculation is accurate only with a steady state creatinine    Protime-INR [[id number]]  (Normal) Collected: 08/27/21 1928    Lab Status: Final result Specimen: Blood from Arm, Left Updated: 08/27/21 1954     Protime 13 4 seconds      INR 1 01    CBC and differential [[id number]]  (Abnormal) Collected: 08/27/21 1929    Lab Status: Final result Specimen: Blood from Arm, Left Updated: 08/27/21 1941     WBC 6 07 Thousand/uL      RBC 3 90 Million/uL      Hemoglobin 12 1 g/dL      Hematocrit 36 0 %      MCV 92 fL      MCH 31 0 pg      MCHC 33 6 g/dL      RDW 12 6 %      MPV 9 5 fL      Platelets 285 Thousands/uL      nRBC 0 /100 WBCs      Neutrophils Relative 56 %      Immat GRANS % 0 %      Lymphocytes Relative 26 %      Monocytes Relative 7 %      Eosinophils Relative 10 % Basophils Relative 1 %      Neutrophils Absolute 3 42 Thousands/µL      Immature Grans Absolute 0 01 Thousand/uL      Lymphocytes Absolute 1 56 Thousands/µL      Monocytes Absolute 0 45 Thousand/µL      Eosinophils Absolute 0 60 Thousand/µL      Basophils Absolute 0 03 Thousands/µL                  No orders to display              Procedures  Procedures         ED Course                                           MDM  Number of Diagnoses or Management Options  Atrial fibrillation ([facility] )  Diagnosis management comments: Well appearing  Afebrile and hemodynamically stable  Patient is in sinus rhythm and asymptomatic on arrival   Electrolytes are within normal limits  Troponin obtained in the setting of risk stratification which is undetectable  Patient denies chest pain or trouble breathing  TSH within normal limits  Creatinine elevated but at baseline  I personally interpreted the patient's EKG which reveals normal rate, normal sinus rhythm, normal axis, normal intervals, no ischemic changes  Patient admits to decreasing his home dose of metoprolol from 50 milligrams to 25 milligrams 1 week ago due to fatigue and bradycardia with higher doses of metoprolol  He is not touch his cardiologist about this  I suspect that this is the reason for his tachycardia at home  As he is asymptomatic now and remains in sinus rhythm, I feel that he stable for discharge home, however I have encouraged him to reach out to his cardiologist's office to discuss making changes in his medications  Return precautions discussed         Amount and/or Complexity of Data Reviewed  Clinical lab tests: ordered and reviewed  Review and summarize past medical records: yes  Independent visualization of images, tracings, or specimens: yes    Patient Progress  Patient progress: resolved           Disposition  Final diagnoses:   Atrial fibrillation ([facility] )     Time reflects when diagnosis was documented in both MDM as applicable and the Disposition within this note     Time User Action Codes Description Comment    8/27/2021  9:19 PM Neda Ulloa Add [I48 91] Atrial fibrillation [facility])       ED Disposition     ED Disposition Condition Date/Time Comment    Discharge Stable Fri Aug 27, 2021  9:19 PM Jose L Stout  discharge to home/self care  Follow-up Information     Follow up With Specialties Details Why Contact Info Additional Information    David Menard MD Cardiology, Multidisciplinary Schedule an appointment as soon as possible for a visit  Please call your cardiologist to discuss adjusting your dosage of metoprolol  [address] [facility] Emergency Department Emergency Medicine  As we discussed, return to the Emergency Department immediatley for palpitations, elevated heart rate, chest pain, trouble breathing, or dizziness   [address] [facility] [address] [facility] Emergency Department, [address], [place], [place], [address]          Discharge Medication List as of 8/27/2021  9:21 PM      CONTINUE these medications which have NOT CHANGED    Details   Cholecalciferol (Vitamin D-3) 25 MCG (1000 UT) CAPS Take by mouth, Historical Med      guaiFENesin (MUCINEX) 600 mg 12 hr tablet Take 1,200 mg by mouth daily, Historical Med      metoprolol succinate (TOPROL-XL) 50 mg 24 hr tablet Take 1 tablet (50 mg total) by mouth daily, Starting Fri 5/28/2021, Normal      multivitamin (THERAGRAN) TABS Take 1 tablet by mouth daily, Historical Med      omeprazole (PriLOSEC) 10 mg delayed release capsule Take 10 mg by mouth daily, Historical Med      rivaroxaban (XARELTO) 15 mg tablet Take 1 tablet (15 mg total) by mouth daily with dinner, Starting Wed 6/2/2021, Normal      tamsulosin (FLOMAX) 0 4 mg Take 1 capsule (0 4 mg total) by mouth daily with dinner, Starting Mon 2/22/2021, Normal No discharge procedures on file      PDMP Review     None          ED Provider  Electronically Signed by           Lula Esqueda MD  08/27/21 0358

## 2021-08-30 ENCOUNTER — OFFICE VISIT (OUTPATIENT)
Dept: CARDIOLOGY CLINIC | Facility: MEDICAL CENTER | Age: 76
End: 2021-08-30
Payer: MEDICARE

## 2021-08-30 VITALS
HEIGHT: 69 IN | HEART RATE: 67 BPM | BODY MASS INDEX: 28.69 KG/M2 | SYSTOLIC BLOOD PRESSURE: 124 MMHG | OXYGEN SATURATION: 94 % | WEIGHT: 193.7 LBS | DIASTOLIC BLOOD PRESSURE: 66 MMHG

## 2021-08-30 DIAGNOSIS — R00.2 PALPITATIONS: ICD-10-CM

## 2021-08-30 DIAGNOSIS — I48.0 PAROXYSMAL ATRIAL FIBRILLATION (HCC): Primary | ICD-10-CM

## 2021-08-30 DIAGNOSIS — E78.5 HYPERLIPIDEMIA: ICD-10-CM

## 2021-08-30 DIAGNOSIS — R00.1 BRADYCARDIA: ICD-10-CM

## 2021-08-30 DIAGNOSIS — I10 ESSENTIAL HYPERTENSION: ICD-10-CM

## 2021-08-30 DIAGNOSIS — N18.32 STAGE 3B CHRONIC KIDNEY DISEASE (HCC): ICD-10-CM

## 2021-08-30 DIAGNOSIS — E66.3 OVERWEIGHT (BMI 25.0-29.9): ICD-10-CM

## 2021-08-30 PROCEDURE — 99214 OFFICE O/P EST MOD 30 MIN: CPT | Performed by: INTERNAL MEDICINE

## 2021-08-30 RX ORDER — METOPROLOL SUCCINATE 50 MG/1
25 TABLET, EXTENDED RELEASE ORAL EVERY 12 HOURS
Qty: 60 TABLET | Refills: 1
Start: 2021-08-30 | End: 2021-09-13 | Stop reason: SDUPTHER

## 2021-08-30 RX ORDER — AMLODIPINE BESYLATE 5 MG/1
TABLET ORAL
COMMUNITY
Start: 2021-08-06 | End: 2022-02-14 | Stop reason: ALTCHOICE

## 2021-08-30 NOTE — PROGRESS NOTES
[facility] [address]  [address] [place] [address]  Phone#  [phone]  Fax#  [phone]                                              Cardiology Office Follow up  Marichuy Howe , 64 y o  male  YOB: 1945  MRN: [id number] Encounter: [id number]      PCP - Britney Cerna, DO    Assessment  1  Paroxysmal Atrial Fibrillation   2  PACs, Irregular heart beat  3  Hypertension  4  CKD  5  Overweight, Body mass index is 28 6 kg/m²  Plan  Paroxysmal Atrial Fibrillation, Frequent PACs, palpitations  · Noted to have atrial fibrillation for 3-4 hours on holter monitor in 5/2021, was otherwise asymptomatic  · Tried to use metoprolol succinate 50 mg daily, but he was too fatigued and bradycardic with it, and as a result had to stop it  · On 8/27/21, he had tachycardia noted on his fitbit persistently for several hours, and went to the ED  He took metoprolol and aspirin prior to this, and by the time EMS did ECG, it was improved  · Initial ED ECG was NSR  · He has started back on metoprolol succinate 25 mg b i d  since then, and has been otherwise feeling well  · Check 2 week Zio patch monitor to assess AFib burden, as his afib episodes have been all asymptomatic  · Continue xarelto - has been somewhat expensive for him, but he prefers to continue same    Hypertension  · Blood pressure today 124/66  · Continue metoprolol succinate 25 mg b i d   · Amlodipine was previously discontinued, when metoprolol was started    CKD  · Creatinine stable at 1 9 to 2 1  · Monitor    Hyperlipidemia, overweight - Body mass index is 28 6 kg/m²      2/21/2021 15:25 7/8/2021 10:27   Cholesterol 189 200 (H)   Triglycerides 206 (H) 123   HDL 43 37 (L)   LDL Calculated 105 (H) 141 (H)   VLDL Cholesterol Richard  22     · Borderline, mildly elevated   · Triglycerides have improved   · Continue dietary modifications and increased exercise   · Weight loss of about 10 lb suggested    ECG today -  No results found for this visit on 08/30/21  Orders Placed This Encounter   Procedures    AMB extended holter monitor     Return in about 4 months (around 12/30/2021), or if symptoms worsen or fail to improve  History of Present Illness   64 y o  male comes in as a new patient for evaluation of irregular heartbeat  He is in his usual state of health, and does not report any major symptoms of chest pain, palpitations, shortness of breath  He recently received his COVID vaccination, and after this was monitoring his pulse ox, and at this time he incidentally noted some irregular heartbeat  He continued to have some irregular heartbeats, and as a result went to the ED for evaluation  In the ED, he was noted to have sinus rhythm with PACs on ECG, and was otherwise monitored overnight and discharged  Since discharge, he continues to be symptom free and has not had any major episodes of heart racing or palpitations  He states that his heart rate goes up to 120 with activity  No history of dizziness, near-syncope or syncope  Interval history - 8/30/2021  He comes back for follow-up after about 2 months  He had otherwise been feeling well since discontinuing his metoprolol, with improvement in his fatigue  As a result, he wanted to hold off on any testing, and see if xarelto also could be stopped  But per my discussion with him through my chart messages, I had asked him to continue Xarelto as his AFib episodes have been previously asymptomatic, while using metoprolol p r n  Ferol Ernesto Unfortunately, on 08/27/2021, he again has had tachycardia noted on his Fitbit, which persisted for several hours, and as a result he decided to go to the ED  By the time EMS arrived, he had already taken aspirin and metoprolol, and with this is heart rate seem to have improved  He was observed in the ED for 3 hours, and was discharged to home with resumption of metoprolol at prior doses     no further complains of dizziness or is severe fatigue since then  No complains of dizziness or lightheadedness  Historical Information   Past Medical History:   Diagnosis Date    Asthma     Hypertension     Paroxysmal A-fib ([address] )      Past Surgical History:   Procedure Laterality Date    CYST REMOVAL      TONSILLECTOMY       Family History   Problem Relation Age of Onset    Diabetes Mother     No Known Problems Father      Current Outpatient Medications on File Prior to Visit   Medication Sig Dispense Refill    amLODIPine (NORVASC) 5 mg tablet       Cholecalciferol (Vitamin D-3) 25 MCG (1000 UT) CAPS Take by mouth      guaiFENesin (MUCINEX) 600 mg 12 hr tablet Take 1,200 mg by mouth daily      multivitamin (THERAGRAN) TABS Take 1 tablet by mouth daily      omeprazole (PriLOSEC) 10 mg delayed release capsule Take 10 mg by mouth daily      rivaroxaban (XARELTO) 15 mg tablet Take 1 tablet (15 mg total) by mouth daily with dinner 90 tablet 1    tamsulosin (FLOMAX) 0 4 mg Take 1 capsule (0 4 mg total) by mouth daily with dinner 30 capsule 0    [DISCONTINUED] metoprolol succinate (TOPROL-XL) 50 mg 24 hr tablet Take 1 tablet (50 mg total) by mouth daily 90 tablet 0     No current facility-administered medications on file prior to visit       Allergies   Allergen Reactions    Chlorpheniramine Other (See Comments)    Phenylephrine Other (See Comments)     Social History     Socioeconomic History    Marital status: /Civil Union     Spouse name: None    Number of children: None    Years of education: None    Highest education level: None   Occupational History    None   Tobacco Use    Smoking status: Never Smoker    Smokeless tobacco: Never Used   Vaping Use    Vaping Use: Never used   Substance and Sexual Activity    Alcohol use: Yes     Comment: social    Drug use: Never    Sexual activity: Never   Other Topics Concern    None   Social History Narrative    None     Social Determinants of Health Financial Resource Strain:     Difficulty of Paying Living Expenses:    Food Insecurity:     Worried About Running Out of Food in the Last Year:     [address] in the Last Year:    Transportation Needs:     Lack of Transportation (Medical):  Lack of Transportation (Non-Medical):    Physical Activity:     Days of Exercise per Week:     Minutes of Exercise per Session:    Stress:     Feeling of Stress :    Social Connections:     Frequency of Communication with Friends and Family:     Frequency of Social Gatherings with Friends and Family:     Attends Religion Services:     Active Member of Clubs or Organizations:     Attends Club or Organization Meetings:     Marital Status:    Intimate Partner Violence:     Fear of Current or Ex-Partner:     Emotionally Abused:     Physically Abused:     Sexually Abused:         Review of Systems   All other systems reviewed and are negative  Vitals:  Vitals:    08/30/21 1302   BP: 124/66   BP Location: Left arm   Patient Position: Sitting   Cuff Size: Adult   Pulse: 67   SpO2: 94%   Weight: 87 9 kg (193 lb 11 2 oz)   Height: 5' 9" (1 753 m)     BMI - Body mass index is 28 6 kg/m²  Wt Readings from Last 7 Encounters:   08/30/21 87 9 kg (193 lb 11 2 oz)   06/21/21 88 5 kg (195 lb)   05/06/21 88 8 kg (195 lb 11 2 oz)   02/21/21 85 3 kg (188 lb)   11/19/19 91 2 kg (201 lb)   09/18/19 90 3 kg (199 lb)   08/07/19 88 9 kg (196 lb)       Physical Exam  Vitals and nursing note reviewed  Constitutional:       General: He is not in acute distress  Appearance: Normal appearance  He is well-developed  He is not ill-appearing or diaphoretic  HENT:      Head: Normocephalic and atraumatic  Nose: No congestion  Eyes:      General: No scleral icterus  Conjunctiva/sclera: Conjunctivae normal    Neck:      Vascular: No carotid bruit or JVD  Cardiovascular:      Rate and Rhythm: Normal rate and regular rhythm        Heart sounds: Normal heart sounds  No murmur heard  No friction rub  No gallop  Pulmonary:      Effort: Pulmonary effort is normal  No respiratory distress  Breath sounds: Normal breath sounds  No wheezing or rales  Chest:      Chest wall: No tenderness  Abdominal:      General: There is no distension  Palpations: Abdomen is soft  Tenderness: There is no abdominal tenderness  Musculoskeletal:         General: No swelling, tenderness or deformity  Cervical back: Neck supple  No muscular tenderness  Right lower leg: No edema  Left lower leg: No edema  Skin:     General: Skin is warm  Neurological:      General: No focal deficit present  Mental Status: He is alert and oriented to person, place, and time  Mental status is at baseline  Psychiatric:         Mood and Affect: Mood normal          Behavior: Behavior normal          Thought Content: Thought content normal          Labs:  CBC:   Lab Results   Component Value Date    WBC 6 07 08/27/2021    RBC 3 90 08/27/2021    HGB 12 1 08/27/2021    HCT 36 0 (L) 08/27/2021    MCV 92 08/27/2021     08/27/2021    RDW 12 6 08/27/2021       CMP:   Lab Results   Component Value Date    K 3 6 08/27/2021     08/27/2021    CO2 23 08/27/2021    BUN 25 08/27/2021    CREATININE 2 01 (H) 08/27/2021    EGFR 31 08/27/2021    CALCIUM 8 8 08/27/2021    AST 17 08/27/2021    ALT 19 08/27/2021    ALKPHOS 64 08/27/2021       Magnesium:  No results found for: MG    Lipid Profile:   Lab Results   Component Value Date    HDL 37 (L) 07/08/2021    TRIG 123 07/08/2021    LDLCALC 141 (H) 07/08/2021       Thyroid Studies:   Lab Results   Component Value Date    QLD2BONCGHLJ 1 709 08/27/2021       No components found for: [employer] [place]    Lab Results   Component Value Date    INR 1 01 08/27/2021   5    Imaging: No results found  Cardiac testing:   No results found for this or any previous visit  No results found for this or any previous visit    No results found for this or any previous visit  No results found for this or any previous visit

## 2021-09-13 DIAGNOSIS — R00.2 PALPITATIONS: ICD-10-CM

## 2021-09-13 DIAGNOSIS — I48.0 PAROXYSMAL ATRIAL FIBRILLATION (HCC): ICD-10-CM

## 2021-09-13 RX ORDER — METOPROLOL SUCCINATE 25 MG/1
25 TABLET, EXTENDED RELEASE ORAL EVERY 12 HOURS
Qty: 180 TABLET | Refills: 3 | Status: SHIPPED | OUTPATIENT
Start: 2021-09-13

## 2021-09-27 ENCOUNTER — EVENT RECORDER/EXTENDED HOLTER (OUTPATIENT)
Dept: CARDIOLOGY CLINIC | Facility: MEDICAL CENTER | Age: 76
End: 2021-09-27
Payer: MEDICARE

## 2021-09-27 DIAGNOSIS — I48.0 PAROXYSMAL ATRIAL FIBRILLATION (HCC): ICD-10-CM

## 2021-09-27 DIAGNOSIS — R00.2 PALPITATIONS: ICD-10-CM

## 2021-09-27 DIAGNOSIS — R00.1 BRADYCARDIA: ICD-10-CM

## 2021-09-27 PROCEDURE — 93246 EXT ECG>7D<15D RECORDING: CPT | Performed by: INTERNAL MEDICINE

## 2021-09-27 PROCEDURE — 93248 EXT ECG>7D<15D REV&INTERPJ: CPT | Performed by: INTERNAL MEDICINE

## 2021-11-11 ENCOUNTER — OFFICE VISIT (OUTPATIENT)
Dept: CARDIOLOGY CLINIC | Facility: MEDICAL CENTER | Age: 76
End: 2021-11-11
Payer: MEDICARE

## 2021-11-11 VITALS
OXYGEN SATURATION: 95 % | WEIGHT: 192 LBS | SYSTOLIC BLOOD PRESSURE: 120 MMHG | HEART RATE: 72 BPM | DIASTOLIC BLOOD PRESSURE: 70 MMHG | BODY MASS INDEX: 28.44 KG/M2 | HEIGHT: 69 IN

## 2021-11-11 DIAGNOSIS — E78.5 HYPERLIPIDEMIA: ICD-10-CM

## 2021-11-11 DIAGNOSIS — E66.3 OVERWEIGHT (BMI 25.0-29.9): ICD-10-CM

## 2021-11-11 DIAGNOSIS — I48.0 PAROXYSMAL ATRIAL FIBRILLATION (HCC): Primary | ICD-10-CM

## 2021-11-11 DIAGNOSIS — I47.1 PSVT (PAROXYSMAL SUPRAVENTRICULAR TACHYCARDIA) (HCC): ICD-10-CM

## 2021-11-11 DIAGNOSIS — I10 ESSENTIAL HYPERTENSION: ICD-10-CM

## 2021-11-11 PROCEDURE — 99213 OFFICE O/P EST LOW 20 MIN: CPT | Performed by: INTERNAL MEDICINE

## 2021-11-11 RX ORDER — ASPIRIN 81 MG/1
81 TABLET ORAL EVERY 12 HOURS
Start: 2021-11-11 | End: 2021-12-16 | Stop reason: ALTCHOICE

## 2021-12-16 DIAGNOSIS — Z79.01 LONG TERM CURRENT USE OF ANTICOAGULANT: ICD-10-CM

## 2021-12-16 DIAGNOSIS — I48.0 PAROXYSMAL ATRIAL FIBRILLATION (HCC): Primary | ICD-10-CM

## 2022-01-28 ENCOUNTER — TELEPHONE (OUTPATIENT)
Dept: CARDIOLOGY CLINIC | Facility: MEDICAL CENTER | Age: 77
End: 2022-01-28

## 2022-01-28 NOTE — TELEPHONE ENCOUNTER
----- Message from [address]  sent at 1/27/2022  5:39 PM EST -----  Regarding: Heartbeat irregularitiy  Dr Jan Blunt, I believe I have been skipping heartbeats recently  I'd like to see you soon  Please advise

## 2022-02-14 ENCOUNTER — OFFICE VISIT (OUTPATIENT)
Dept: CARDIOLOGY CLINIC | Facility: CLINIC | Age: 77
End: 2022-02-14
Payer: MEDICARE

## 2022-02-14 VITALS
HEIGHT: 69 IN | BODY MASS INDEX: 27.78 KG/M2 | WEIGHT: 187.6 LBS | HEART RATE: 87 BPM | DIASTOLIC BLOOD PRESSURE: 90 MMHG | SYSTOLIC BLOOD PRESSURE: 120 MMHG

## 2022-02-14 DIAGNOSIS — R63.0 ANOREXIA: ICD-10-CM

## 2022-02-14 DIAGNOSIS — I47.1 PSVT (PAROXYSMAL SUPRAVENTRICULAR TACHYCARDIA) (HCC): ICD-10-CM

## 2022-02-14 DIAGNOSIS — I10 ESSENTIAL HYPERTENSION: ICD-10-CM

## 2022-02-14 DIAGNOSIS — R63.4 WEIGHT LOSS: ICD-10-CM

## 2022-02-14 DIAGNOSIS — I48.0 PAROXYSMAL ATRIAL FIBRILLATION (HCC): Primary | ICD-10-CM

## 2022-02-14 PROCEDURE — 93000 ELECTROCARDIOGRAM COMPLETE: CPT | Performed by: INTERNAL MEDICINE

## 2022-02-14 PROCEDURE — 1123F ACP DISCUSS/DSCN MKR DOCD: CPT | Performed by: INTERNAL MEDICINE

## 2022-02-14 PROCEDURE — 99213 OFFICE O/P EST LOW 20 MIN: CPT | Performed by: INTERNAL MEDICINE

## 2022-02-14 RX ORDER — ASPIRIN 81 MG/1
81 TABLET, CHEWABLE ORAL EVERY 12 HOURS
COMMUNITY

## 2022-02-14 RX ORDER — FLUTICASONE FUROATE AND VILANTEROL TRIFENATATE 100; 25 UG/1; UG/1
POWDER RESPIRATORY (INHALATION)
COMMUNITY
Start: 2021-11-10

## 2022-02-14 NOTE — PROGRESS NOTES
[facility] [place] [facility] [address] [address]  Phone#  [phone]  Fax#  [phone]                                              Cardiology Office Follow up  Amish Wheat , 64 y o  male  YOB: 1945  MRN: [id number] Encounter: [id number]      PCP - Frankey Limes, DO    Assessment  1  Paroxysmal Atrial Fibrillation   2  Paroxysmal SVT  · Zio-patch - 9/2021 - 40 short SVT episodes, upto 22 beats (12 seconds), no Afib  3  PACs, Irregular heart beat  4  Hypertension  5  CKD  6  Overweight, Body mass index is 27 7 kg/m²  Plan  Paroxysmal Atrial Fibrillation, Frequent PACs, PSVT  · #1 - 5/2021 - had atrial fibrillation for 3-4 hours on holter monitor, asymptomatic, feels it was after his Moderna vaccine  ·  tried metoprolol but was too fatigued and as a result stopped  · #2 - 8/27/21 - had tachycardia noted on fitbit persistently for several hours, and went to the ED after taking metoprolol and aspirin  · Initial ED ECG was NSR  · He has started back on metoprolol succinate 25 mg b i d  since then, and has been otherwise feeling well  · Zio patch - 9/2021 - no further AFib, but frequent short, asymptomatic SVT episodes - upto 12 seconds  ·  he has a cardia mobile at home which she has not used  · ECG today is still sinus rhythm   · Does not appear to have any further episodes of AFib   · Okay with switching from Xarelto to prior home dose of aspirin  · Continue metoprolol 25 mg b i d  Hypertension  · Blood pressure again well controlled at 120/90 today   · Continue metoprolol succinate 25 mg b i d  · Remains off amlodipine    CKD  · Creatinine stable at 1 9 to 2 1  · Monitor    Hyperlipidemia, overweight - Body mass index is 27 7 kg/m²      2/21/2021 15:25 7/8/2021 10:27   Cholesterol 189 200 (H)   Triglycerides 206 (H) 123   HDL 43 37 (L)   LDL Calculated 105 (H) 141 (H)   VLDL Cholesterol Richard  22     · Borderline, mildly elevated · Triglycerides have improved   · He has lost about 7 lb due to anorexia  · Monitor    Anorexia, weight loss  · 7 lb weight loss due to lack of appetite  · Follow up with PCP    ECG today -  Results for orders placed or performed in visit on 02/14/22   POCT ECG    Impression     Normal sinus rhythm        Orders Placed This Encounter   Procedures    POCT ECG     Return in about 6 months (around 8/14/2022), or if symptoms worsen or fail to improve  History of Present Illness   64 y o  male comes in as a new patient for evaluation of irregular heartbeat  He is in his usual state of health, and does not report any major symptoms of chest pain, palpitations, shortness of breath  He recently received his COVID vaccination, and after this was monitoring his pulse ox, and at this time he incidentally noted some irregular heartbeat  He continued to have some irregular heartbeats, and as a result went to the ED for evaluation  In the ED, he was noted to have sinus rhythm with PACs on ECG, and was otherwise monitored overnight and discharged  Since discharge, he continues to be symptom free and has not had any major episodes of heart racing or palpitations  He states that his heart rate goes up to 120 with activity  No history of dizziness, near-syncope or syncope  Interval history - 8/30/2021  He comes back for follow-up after about 2 months  He had otherwise been feeling well since discontinuing his metoprolol, with improvement in his fatigue  As a result, he wanted to hold off on any testing, and see if xarelto also could be stopped  But per my discussion with him through my chart messages, I had asked him to continue Xarelto as his AFib episodes have been previously asymptomatic, while using metoprolol p r n  Brett Manifold Unfortunately, on 08/27/2021, he again has had tachycardia noted on his Fitbit, which persisted for several hours, and as a result he decided to go to the ED    By the time EMS arrived, he had already taken aspirin and metoprolol, and with this is heart rate seem to have improved  He was observed in the ED for 3 hours, and was discharged to home with resumption of metoprolol at prior doses  no further complains of dizziness or is severe fatigue since then  No complains of dizziness or lightheadedness  Interval history - 11/11/2021  He comes back for follow-up after about 3 months  He completed the 2 week Zio patch monitor, and did not have any atrial fibrillation episodes on the same  He continues to feel well otherwise  No complains of chest pain, shortness of breath  Interval history - 2/14/2022  He comes back for follow-up after about 3 months  He has not had any further symptoms of persistent palpitations  No complains of dizziness or lightheadedness, near-syncope or syncope    He reports significant anorexia over the past few months, and has lost about 7 lb      Historical Information   Past Medical History:   Diagnosis Date    Asthma     CKD (chronic kidney disease)     Hypertension     Paroxysmal A-fib (HCC)     Paroxysmal SVT (supraventricular tachycardia) (HCC)      Past Surgical History:   Procedure Laterality Date    CYST REMOVAL      TONSILLECTOMY       Family History   Problem Relation Age of Onset    Diabetes Mother     No Known Problems Father      Current Outpatient Medications on File Prior to Visit   Medication Sig Dispense Refill    aspirin 81 mg chewable tablet Chew 81 mg every 12 (twelve) hours      Breo Ellipta 100-25 MCG/INH inhaler       Cholecalciferol (Vitamin D-3) 25 MCG (1000 UT) CAPS Take by mouth      metoprolol succinate (TOPROL-XL) 25 mg 24 hr tablet Take 1 tablet (25 mg total) by mouth every 12 (twelve) hours 180 tablet 3    multivitamin (THERAGRAN) TABS Take 1 tablet by mouth daily      tamsulosin (FLOMAX) 0 4 mg Take 1 capsule (0 4 mg total) by mouth daily with dinner 30 capsule 0    [DISCONTINUED] rivaroxaban (Xarelto) 15 mg tablet Take 1 tablet (15 mg total) by mouth daily with breakfast 90 tablet 3    guaiFENesin (MUCINEX) 600 mg 12 hr tablet Take 1,200 mg by mouth daily (Patient not taking: Reported on 2/14/2022 )      omeprazole (PriLOSEC) 10 mg delayed release capsule Take 10 mg by mouth daily (Patient not taking: Reported on 2/14/2022 )      [DISCONTINUED] amLODIPine (NORVASC) 5 mg tablet  (Patient not taking: Reported on 2/14/2022 )       No current facility-administered medications on file prior to visit  Allergies   Allergen Reactions    Chlorpheniramine Other (See Comments)    Phenylephrine Other (See Comments)     Social History     Socioeconomic History    Marital status: /Civil Union     Spouse name: None    Number of children: None    Years of education: None    Highest education level: None   Occupational History    None   Tobacco Use    Smoking status: Never Smoker    Smokeless tobacco: Never Used   Vaping Use    Vaping Use: Never used   Substance and Sexual Activity    Alcohol use: Yes     Comment: social    Drug use: Never    Sexual activity: Never   Other Topics Concern    None   Social History Narrative    None     Social Determinants of Health     Financial Resource Strain: Not on file   Food Insecurity: Not on file   Transportation Needs: Not on file   Physical Activity: Not on file   Stress: Not on file   Social Connections: Not on file   Intimate Partner Violence: Not on file   Housing Stability: Not on file        Review of Systems   All other systems reviewed and are negative  Vitals:  Vitals:    02/14/22 1244   BP: 120/90   Pulse: 87   Weight: 85 1 kg (187 lb 9 6 oz)   Height: 5' 9" (1 753 m)     BMI - Body mass index is 27 7 kg/m²    Wt Readings from Last 7 Encounters:   02/14/22 85 1 kg (187 lb 9 6 oz)   11/11/21 87 1 kg (192 lb)   08/30/21 87 9 kg (193 lb 11 2 oz)   06/21/21 88 5 kg (195 lb)   05/06/21 88 8 kg (195 lb 11 2 oz)   02/21/21 85 3 kg (188 lb)   11/19/19 91 2 kg (201 lb) Physical Exam  Vitals and nursing note reviewed  Constitutional:       General: He is not in acute distress  Appearance: Normal appearance  He is well-developed  He is not ill-appearing or diaphoretic  HENT:      Head: Normocephalic and atraumatic  Nose: No congestion  Eyes:      General: No scleral icterus  Conjunctiva/sclera: Conjunctivae normal    Neck:      Vascular: No carotid bruit or JVD  Cardiovascular:      Rate and Rhythm: Normal rate and regular rhythm  Heart sounds: Normal heart sounds  No murmur heard  No friction rub  No gallop  Pulmonary:      Effort: Pulmonary effort is normal  No respiratory distress  Breath sounds: Normal breath sounds  No wheezing or rales  Chest:      Chest wall: No tenderness  Abdominal:      General: There is no distension  Palpations: Abdomen is soft  Tenderness: There is no abdominal tenderness  Musculoskeletal:         General: No swelling, tenderness or deformity  Cervical back: Neck supple  No muscular tenderness  Right lower leg: No edema  Left lower leg: No edema  Skin:     General: Skin is warm  Neurological:      General: No focal deficit present  Mental Status: He is alert and oriented to person, place, and time  Mental status is at baseline  Psychiatric:         Mood and Affect: Mood normal          Behavior: Behavior normal          Thought Content:  Thought content normal          Labs:  CBC:   Lab Results   Component Value Date    WBC 6 07 08/27/2021    RBC 3 90 08/27/2021    HGB 12 1 08/27/2021    HCT 36 0 (L) 08/27/2021    MCV 92 08/27/2021     08/27/2021    RDW 12 6 08/27/2021       CMP:   Lab Results   Component Value Date    K 3 6 08/27/2021     08/27/2021    CO2 23 08/27/2021    BUN 25 08/27/2021    CREATININE 2 01 (H) 08/27/2021    EGFR 31 08/27/2021    CALCIUM 8 8 08/27/2021    AST 17 08/27/2021    ALT 19 08/27/2021    ALKPHOS 64 08/27/2021       Magnesium:  No results found for: MG    Lipid Profile:   Lab Results   Component Value Date    HDL 37 (L) 07/08/2021    TRIG 123 07/08/2021    LDLCALC 141 (H) 07/08/2021       Thyroid Studies:   Lab Results   Component Value Date    [id number] 1 709 08/27/2021       [facility]: [facility]    Lab Results   Component Value Date    INR 1 01 08/27/2021   5    Imaging: No results found  Cardiac testing:   No results found for this or any previous visit  No results found for this or any previous visit  No results found for this or any previous visit  No results found for this or any previous visit

## 2022-08-30 ENCOUNTER — OFFICE VISIT (OUTPATIENT)
Dept: CARDIOLOGY CLINIC | Facility: CLINIC | Age: 77
End: 2022-08-30
Payer: MEDICARE

## 2022-08-30 VITALS
HEIGHT: 69 IN | WEIGHT: 191 LBS | SYSTOLIC BLOOD PRESSURE: 116 MMHG | BODY MASS INDEX: 28.29 KG/M2 | DIASTOLIC BLOOD PRESSURE: 80 MMHG | HEART RATE: 64 BPM

## 2022-08-30 DIAGNOSIS — N18.30 BENIGN HYPERTENSION WITH CKD (CHRONIC KIDNEY DISEASE) STAGE III (HCC): ICD-10-CM

## 2022-08-30 DIAGNOSIS — I47.1 PSVT (PAROXYSMAL SUPRAVENTRICULAR TACHYCARDIA) (HCC): ICD-10-CM

## 2022-08-30 DIAGNOSIS — I48.0 PAROXYSMAL ATRIAL FIBRILLATION (HCC): Primary | ICD-10-CM

## 2022-08-30 DIAGNOSIS — I12.9 BENIGN HYPERTENSION WITH CKD (CHRONIC KIDNEY DISEASE) STAGE III (HCC): ICD-10-CM

## 2022-08-30 DIAGNOSIS — R00.2 PALPITATIONS: ICD-10-CM

## 2022-08-30 PROCEDURE — 99213 OFFICE O/P EST LOW 20 MIN: CPT | Performed by: INTERNAL MEDICINE

## 2022-08-30 PROCEDURE — 93000 ELECTROCARDIOGRAM COMPLETE: CPT | Performed by: INTERNAL MEDICINE

## 2022-08-30 RX ORDER — METOPROLOL SUCCINATE 25 MG/1
25 TABLET, EXTENDED RELEASE ORAL EVERY 12 HOURS
Qty: 180 TABLET | Refills: 3 | Status: SHIPPED | OUTPATIENT
Start: 2022-08-30

## 2022-08-30 NOTE — PROGRESS NOTES
[facility], [facility] [address] [address]  Phone#  [phone]  Fax#  [phone]                                              Cardiology Office Follow up  Regina Ley , 76 y o  male  YOB: 1945  MRN: [id number] Encounter: [id number]      PCP - Marcel Robins, DO    Assessment  Paroxysmal Atrial Fibrillation   Paroxysmal SVT  Zio-patch - 9/2021 - 40 short SVT episodes, upto 22 beats (12 seconds), no Afib  PACs, Irregular heart beat  Hypertension  CKD  Overweight, Body mass index is 28 21 kg/m²  Plan  Paroxysmal Atrial Fibrillation, Frequent PACs, PSVT  #1 - 5/2021 - had atrial fibrillation for 3-4 hours on holter monitor, asymptomatic, feels it was after his Moderna vaccine   tried metoprolol but was too fatigued and as a result stopped  #2 - 8/27/21 - had tachycardia noted on fitbit persistently for several hours, and went to the ED after taking metoprolol and aspirin  Initial ED ECG was NSR  He has started back on metoprolol succinate 25 mg b i d  since then, and has been otherwise feeling well  Zio patch - 9/2021 - no further AFib, but frequent short, asymptomatic SVT episodes - upto 12 seconds  He was switched back from Xarelto to aspirin after this  No further complains of palpitations over the past 6 months and he continues to do well otherwise   He now uses a Fitbit to track his heart rate and he has not had any major issues on this  ECG today - sinus rhythm  Continue metoprolol XL 25 mg b i d , aspirin 81 bid    Hypertension with CKD  Well controlled, 116/80  Continue metoprolol succinate 25 mg b i d  Cr stable at 1 9-2 1  Remains off amlodipine  Monitor    Hyperlipidemia, overweight - Body mass index is 28 21 kg/m²      2/21/2021 15:25 7/8/2021 10:27   Cholesterol 189 200 (H)   Triglycerides 206 (H) 123   HDL 43 37 (L)   LDL Calculated 105 (H) 141 (H)   VLDL Cholesterol Richard  22     Follow-up lipid panel with an your blood draw through PCP  Weight remains stable around 191 lb   Again counseled regarding need for increasing dynamic exercises with walking at least 30 minutes daily, and trying to do about 10,000 steps per day  Goal weight 175-180 lbs    ECG today -  Results for orders placed or performed in visit on 08/30/22   POCT ECG    Impression    Normal sinus rhythm  Normal ECG        Orders Placed This Encounter   Procedures    POCT ECG     He prefers to follow up in April, when the weather is a bit better  Return in about 7 months (around 4/12/2023), or if symptoms worsen or fail to improve  History of Present Illness   76 y o  male comes in as a new patient for evaluation of irregular heartbeat  He is in his usual state of health, and does not report any major symptoms of chest pain, palpitations, shortness of breath  He recently received his COVID vaccination, and after this was monitoring his pulse ox, and at this time he incidentally noted some irregular heartbeat  He continued to have some irregular heartbeats, and as a result went to the ED for evaluation  In the ED, he was noted to have sinus rhythm with PACs on ECG, and was otherwise monitored overnight and discharged  Since discharge, he continues to be symptom free and has not had any major episodes of heart racing or palpitations  He states that his heart rate goes up to 120 with activity  No history of dizziness, near-syncope or syncope  Interval history - 8/30/2021  He comes back for follow-up after about 2 months  He had otherwise been feeling well since discontinuing his metoprolol, with improvement in his fatigue  As a result, he wanted to hold off on any testing, and see if xarelto also could be stopped  But per my discussion with him through my chart messages, I had asked him to continue Xarelto as his AFib episodes have been previously asymptomatic, while using metoprolol p r n  [place]   Unfortunately, on 08/27/2021, he again has had tachycardia noted on his Fitbit, which persisted for several hours, and as a result he decided to go to the ED  By the time EMS arrived, he had already taken aspirin and metoprolol, and with this is heart rate seem to have improved  He was observed in the ED for 3 hours, and was discharged to home with resumption of metoprolol at prior doses  no further complains of dizziness or is severe fatigue since then  No complains of dizziness or lightheadedness  Interval history - 11/11/2021  He comes back for follow-up after about 3 months  He completed the 2 week Zio patch monitor, and did not have any atrial fibrillation episodes on the same  He continues to feel well otherwise  No complains of chest pain, shortness of breath  Interval history - 2/14/2022  He comes back for follow-up after about 3 months  He has not had any further symptoms of persistent palpitations  No complains of dizziness or lightheadedness, near-syncope or syncope  He reports significant anorexia over the past few months, and has lost about 7 lb    Interval history - 8/30/2022  He comes back for follow-up after about 6 months  He has overall been doing well and has not had any major recurrences of palpitations  He does continue to monitor his heart rate with a Fitbit    He was more active doing 6000 steps/day until a couple of weeks ago        Historical Information   Past Medical History:   Diagnosis Date    Asthma     CKD (chronic kidney disease)     Hypertension     Paroxysmal A-fib (HCC)     Paroxysmal SVT [facility]supraventricular tachycardia) ([facility])      Past Surgical History:   Procedure Laterality Date    CYST REMOVAL      TONSILLECTOMY       Family History   Problem Relation Age of Onset    Diabetes Mother     No Known Problems Father      Current Outpatient Medications on File Prior to Visit   Medication Sig Dispense Refill    aspirin 81 mg chewable tablet Chew 81 mg every 12 (twelve) hours      Breo Ellipta 100-25 MCG/INH inhaler       Cholecalciferol (Vitamin D-3) 25 MCG (1000 UT) CAPS Take by mouth      guaiFENesin (MUCINEX) 600 mg 12 hr tablet Take 1,200 mg by mouth daily      multivitamin (THERAGRAN) TABS Take 1 tablet by mouth daily      omeprazole (PriLOSEC) 10 mg delayed release capsule Take 10 mg by mouth daily      [DISCONTINUED] metoprolol succinate (TOPROL-XL) 25 mg 24 hr tablet Take 1 tablet (25 mg total) by mouth every 12 (twelve) hours 180 tablet 3    [DISCONTINUED] tamsulosin (FLOMAX) 0 4 mg Take 1 capsule (0 4 mg total) by mouth daily with dinner 30 capsule 0     No current facility-administered medications on file prior to visit  Allergies   Allergen Reactions    Chlorpheniramine Other (See Comments)    Phenylephrine Other (See Comments)     Social History     Socioeconomic History    Marital status: /Civil Union     Spouse name: None    Number of children: None    Years of education: None    Highest education level: None   Occupational History    None   Tobacco Use    Smoking status: Never Smoker    Smokeless tobacco: Never Used   Vaping Use    Vaping Use: Never used   Substance and Sexual Activity    Alcohol use: Yes     Comment: social    Drug use: Never    Sexual activity: Never   Other Topics Concern    None   Social History Narrative    None     Social Determinants of Health     Financial Resource Strain: Not on file   Food Insecurity: Not on file   Transportation Needs: Not on file   Physical Activity: Not on file   Stress: Not on file   Social Connections: Not on file   Intimate Partner Violence: Not on file   Housing Stability: Not on file        Review of Systems   All other systems reviewed and are negative  Vitals:  Vitals:    08/30/22 1527   BP: 116/80   Pulse: 64   Weight: 86 6 kg (191 lb)   Height: 5' 9" (1 753 m)     BMI - Body mass index is 28 21 kg/m²    Wt Readings from Last 7 Encounters:   08/30/22 86 6 kg (191 lb)   02/14/22 85 1 kg (187 lb 9 6 oz) 11/11/21 87 1 kg (192 lb)   08/30/21 87 9 kg (193 lb 11 2 oz)   06/21/21 88 5 kg (195 lb)   05/06/21 88 8 kg (195 lb 11 2 oz)   02/21/21 85 3 kg (188 lb)       Physical Exam  Vitals and nursing note reviewed  Constitutional:       General: He is not in acute distress  Appearance: Normal appearance  He is well-developed  He is not ill-appearing or diaphoretic  HENT:      Head: Normocephalic and atraumatic  Nose: No congestion  Eyes:      General: No scleral icterus  Conjunctiva/sclera: Conjunctivae normal    Neck:      Vascular: No carotid bruit or JVD  Cardiovascular:      Rate and Rhythm: Normal rate and regular rhythm  Heart sounds: Normal heart sounds  No murmur heard  No friction rub  No gallop  Pulmonary:      Effort: Pulmonary effort is normal  No respiratory distress  Breath sounds: Normal breath sounds  No wheezing or rales  Chest:      Chest wall: No tenderness  Abdominal:      General: There is no distension  Palpations: Abdomen is soft  Tenderness: There is no abdominal tenderness  Musculoskeletal:         General: No swelling, tenderness or deformity  Cervical back: Neck supple  No muscular tenderness  Right lower leg: No edema  Left lower leg: No edema  Skin:     General: Skin is warm  Neurological:      General: No focal deficit present  Mental Status: He is alert and oriented to person, place, and time  Mental status is at baseline  Psychiatric:         Mood and Affect: Mood normal          Behavior: Behavior normal          Thought Content:  Thought content normal          Labs:  CBC:   Lab Results   Component Value Date    WBC 6 07 08/27/2021    RBC 3 90 08/27/2021    HGB 12 1 08/27/2021    HCT 36 0 (L) 08/27/2021    MCV 92 08/27/2021     08/27/2021    RDW 12 6 08/27/2021       CMP:   Lab Results   Component Value Date    K 3 6 08/27/2021     08/27/2021    CO2 23 08/27/2021    BUN 25 08/27/2021    CREATININE 2 01 (H) 08/27/2021    EGFR 31 08/27/2021    CALCIUM 8 8 08/27/2021    AST 17 08/27/2021    ALT 19 08/27/2021    ALKPHOS 64 08/27/2021       Magnesium:  No results found for: MG    Lipid Profile:   Lab Results   Component Value Date    HDL 37 (L) 07/08/2021    TRIG 123 07/08/2021    LDLCALC 141 (H) 07/08/2021       Thyroid Studies:   Lab Results   Component Value Date    [id number] 1 709 08/27/2021       No components found for: [facility]    Lab Results   Component Value Date    INR 1 01 08/27/2021   5    Imaging: No results found  Cardiac testing:   No results found for this or any previous visit  No results found for this or any previous visit  No results found for this or any previous visit  No results found for this or any previous visit

## 2022-10-19 ENCOUNTER — OFFICE VISIT (OUTPATIENT)
Dept: CARDIOLOGY CLINIC | Facility: CLINIC | Age: 77
End: 2022-10-19
Payer: MEDICARE

## 2022-10-19 VITALS
BODY MASS INDEX: 27.4 KG/M2 | HEIGHT: 69 IN | DIASTOLIC BLOOD PRESSURE: 96 MMHG | HEART RATE: 72 BPM | WEIGHT: 185 LBS | SYSTOLIC BLOOD PRESSURE: 132 MMHG

## 2022-10-19 DIAGNOSIS — E78.00 PURE HYPERCHOLESTEROLEMIA: ICD-10-CM

## 2022-10-19 DIAGNOSIS — N18.30 BENIGN HYPERTENSION WITH CKD (CHRONIC KIDNEY DISEASE) STAGE III (HCC): ICD-10-CM

## 2022-10-19 DIAGNOSIS — E66.3 OVERWEIGHT (BMI 25.0-29.9): ICD-10-CM

## 2022-10-19 DIAGNOSIS — I48.0 PAROXYSMAL ATRIAL FIBRILLATION (HCC): Primary | ICD-10-CM

## 2022-10-19 DIAGNOSIS — I12.9 BENIGN HYPERTENSION WITH CKD (CHRONIC KIDNEY DISEASE) STAGE III (HCC): ICD-10-CM

## 2022-10-19 DIAGNOSIS — R00.2 PALPITATIONS: ICD-10-CM

## 2022-10-19 PROCEDURE — 99213 OFFICE O/P EST LOW 20 MIN: CPT | Performed by: INTERNAL MEDICINE

## 2022-10-19 NOTE — PROGRESS NOTES
[facility], [facility] Sheridan Garza  [facility] [address]  Phone#  [phone]  Fax#  [phone]                                              Cardiology Office Follow up  Jenny Sanders , 76 y o  male  YOB: 1945  MRN: [id number] Encounter: [id number]      PCP - Caden Monzon, DO    Assessment  Paroxysmal Atrial Fibrillation   Paroxysmal SVT  Zio-patch - 9/2021 - 40 short SVT episodes, upto 22 beats (12 seconds), no Afib  PACs, Irregular heart beat  Hypertension  CKD  Overweight, Body mass index is 27 32 kg/m²  Plan  Paroxysmal Atrial Fibrillation, Frequent PACs, PSVT  #1 - 5/2021 - had atrial fibrillation for 3-4 hours on holter monitor, asymptomatic, feels it was after his Moderna vaccine   tried metoprolol but was too fatigued and as a result stopped  #2 - 8/27/21 - had tachycardia noted on fitbit persistently for several hours, and went to the ED after taking metoprolol and aspirin  Initial ED ECG was NSR  He has started back on metoprolol succinate 25 mg b i d  since then, and has been otherwise feeling well  9/2021 - Zio patch  no further AFib, but frequent short, asymptomatic SVT episodes - upto 12 seconds  He was switched back from Xarelto to aspirin after this  He has lost about 10 lb over the last year and now his heart rate is on the lower side an averaging around 55-70 on his fitbit  he noticed that his heart rate is dropping down to mid 40s at night when he rests or lays down and was concerned and had questions about this, but otherwise does not have any dizziness or lightheadedness, near-syncope or syncope  Continue metoprolol XL 25 mg b i d , aspirin 81 bid    Hypertension with CKD  Reasonably well controlled, 132/96 today  Continue metoprolol succinate 25 mg b i d    With weight loss his blood pressure is otherwise improved and he remains of amlodipine  Low salt diet    Hyperlipidemia, overweight - Body mass index is 27 32 kg/m²  He reports having had a lipid panel done with her PCP in February 2022, where his LDL was high and he still was low  Encouraged increasing walking, but his leg weaknes limits him weight continues to down trend and he has lost 10 lb over the last 1 year, and about 15-18 lb over the last 3 years  Dietary modifications and exercise    ECG today -  No results found for this visit on 10/19/22  No orders of the defined types were placed in this encounter  Return in about 6 months (around 4/19/2023), or if symptoms worsen or fail to improve  History of Present Illness   76 y o  male comes in as a new patient for evaluation of irregular heartbeat  He is in his usual state of health, and does not report any major symptoms of chest pain, palpitations, shortness of breath  He recently received his COVID vaccination, and after this was monitoring his pulse ox, and at this time he incidentally noted some irregular heartbeat  He continued to have some irregular heartbeats, and as a result went to the ED for evaluation  In the ED, he was noted to have sinus rhythm with PACs on ECG, and was otherwise monitored overnight and discharged  Since discharge, he continues to be symptom free and has not had any major episodes of heart racing or palpitations  He states that his heart rate goes up to 120 with activity  No history of dizziness, near-syncope or syncope  Interval history - 8/30/2021  He comes back for follow-up after about 2 months  He had otherwise been feeling well since discontinuing his metoprolol, with improvement in his fatigue  As a result, he wanted to hold off on any testing, and see if xarelto also could be stopped  But per my discussion with him through my chart messages, I had asked him to continue Xarelto as his AFib episodes have been previously asymptomatic, while using metoprolol p r n  Everett Quijano   Unfortunately, on 08/27/2021, he again has had tachycardia noted on his Fitbit, which persisted for several hours, and as a result he decided to go to the ED  By the time EMS arrived, he had already taken aspirin and metoprolol, and with this is heart rate seem to have improved  He was observed in the ED for 3 hours, and was discharged to home with resumption of metoprolol at prior doses  no further complains of dizziness or is severe fatigue since then  No complains of dizziness or lightheadedness  Interval history - 11/11/2021  He comes back for follow-up after about 3 months  He completed the 2 week Zio patch monitor, and did not have any atrial fibrillation episodes on the same  He continues to feel well otherwise  No complains of chest pain, shortness of breath  Interval history - 2/14/2022  He comes back for follow-up after about 3 months  He has not had any further symptoms of persistent palpitations  No complains of dizziness or lightheadedness, near-syncope or syncope  He reports significant anorexia over the past few months, and has lost about 7 lb    Interval history - 8/30/2022  He comes back for follow-up after about 6 months  He has overall been doing well and has not had any major recurrences of palpitations  He does continue to monitor his heart rate with a Fitbit  He was more active doing 6000 steps/day until a couple of weeks ago    Interval history - 10/19/2022  He comes back for follow-up after about 2 months  He is here for an earlier visit due to concerns about his heart rate going quite slow at night, into the mid 40s on his Fitbit  No other symptoms of dizziness or lightheadedness, near-syncope or syncope  No symptoms of palpitations, or any persistent tachycardia        Historical Information   Past Medical History:   Diagnosis Date   • Asthma    • CKD (chronic kidney disease)    • Hypertension    • Paroxysmal A-fib (HCC)    • Paroxysmal SVT (supraventricular tachycardia) (HCC)      Past Surgical History:   Procedure Laterality Date   • CYST REMOVAL     • TONSILLECTOMY       Family History   Problem Relation Age of Onset   • Diabetes Mother    • No Known Problems Father      Current Outpatient Medications on File Prior to Visit   Medication Sig Dispense Refill   • Apoaequorin (PREVAGEN PO) Take by mouth in the morning     • aspirin 81 mg chewable tablet Chew 81 mg every 12 (twelve) hours     • Breo Ellipta 100-25 MCG/INH inhaler      • Cholecalciferol (Vitamin D-3) 25 MCG (1000 UT) CAPS Take by mouth     • guaiFENesin (MUCINEX) 600 mg 12 hr tablet Take 1,200 mg by mouth daily     • metoprolol succinate (TOPROL-XL) 25 mg 24 hr tablet Take 1 tablet (25 mg total) by mouth every 12 (twelve) hours 180 tablet 3   • multivitamin (THERAGRAN) TABS Take 1 tablet by mouth daily     • omeprazole (PriLOSEC) 10 mg delayed release capsule Take 10 mg by mouth daily       No current facility-administered medications on file prior to visit       Allergies   Allergen Reactions   • Chlorpheniramine Other (See Comments)   • Phenylephrine Other (See Comments)     Social History     Socioeconomic History   • Marital status: /Civil Union     Spouse name: None   • Number of children: None   • Years of education: None   • Highest education level: None   Occupational History   • None   Tobacco Use   • Smoking status: Never Smoker   • Smokeless tobacco: Never Used   Vaping Use   • Vaping Use: Never used   Substance and Sexual Activity   • Alcohol use: Yes     Comment: social   • Drug use: Never   • Sexual activity: Never   Other Topics Concern   • None   Social History Narrative   • None     Social Determinants of Health     Financial Resource Strain: Not on file   Food Insecurity: Not on file   Transportation Needs: Not on file   Physical Activity: Not on file   Stress: Not on file   Social Connections: Not on file   Intimate Partner Violence: Not on file   Housing Stability: Not on file        Review of Systems   All other systems reviewed and are negative  Vitals:  Vitals:    10/19/22 1518   BP: 132/96   Pulse: 72   Weight: 83 9 kg (185 lb)   Height: 5' 9" (1 753 m)     BMI - Body mass index is 27 32 kg/m²  Wt Readings from Last 7 Encounters:   10/19/22 83 9 kg (185 lb)   08/30/22 86 6 kg (191 lb)   02/14/22 85 1 kg (187 lb 9 6 oz)   11/11/21 87 1 kg (192 lb)   08/30/21 87 9 kg (193 lb 11 2 oz)   06/21/21 88 5 kg (195 lb)   05/06/21 88 8 kg (195 lb 11 2 oz)       Physical Exam  Vitals and nursing note reviewed  Constitutional:       General: He is not in acute distress  Appearance: Normal appearance  He is well-developed  He is not ill-appearing or diaphoretic  HENT:      Head: Normocephalic and atraumatic  Nose: No congestion  Eyes:      General: No scleral icterus  Conjunctiva/sclera: Conjunctivae normal    Neck:      Vascular: No carotid bruit or JVD  Cardiovascular:      Rate and Rhythm: Normal rate and regular rhythm  Heart sounds: Normal heart sounds  No murmur heard  No friction rub  No gallop  Pulmonary:      Effort: Pulmonary effort is normal  No respiratory distress  Breath sounds: Normal breath sounds  No wheezing or rales  Chest:      Chest wall: No tenderness  Abdominal:      General: There is no distension  Palpations: Abdomen is soft  Tenderness: There is no abdominal tenderness  Musculoskeletal:         General: No swelling, tenderness or deformity  Cervical back: Neck supple  No muscular tenderness  Right lower leg: No edema  Left lower leg: No edema  Skin:     General: Skin is warm  Neurological:      General: No focal deficit present  Mental Status: He is alert and oriented to person, place, and time  Mental status is at baseline  Psychiatric:         Mood and Affect: Mood normal          Behavior: Behavior normal          Thought Content:  Thought content normal          Labs:  CBC:   Lab Results   Component Value Date    WBC 6 07 08/27/2021    RBC 3 90 08/27/2021    HGB 12 1 08/27/2021    HCT 36 0 (L) 08/27/2021    MCV 92 08/27/2021     08/27/2021    RDW 12 6 08/27/2021       CMP:   Lab Results   Component Value Date    K 3 6 08/27/2021     08/27/2021    CO2 23 08/27/2021    BUN 25 08/27/2021    CREATININE 2 01 (H) 08/27/2021    EGFR 31 08/27/2021    CALCIUM 8 8 08/27/2021    AST 17 08/27/2021    ALT 19 08/27/2021    ALKPHOS 64 08/27/2021       Magnesium:  No results found for: MG    Lipid Profile:   Lab Results   Component Value Date    HDL 37 (L) 07/08/2021    TRIG 123 07/08/2021    LDLCALC 141 (H) 07/08/2021       Thyroid Studies:   Lab Results   Component Value Date    [id number] 1 709 08/27/2021       No components found for: [facility]    Lab Results   Component Value Date    INR 1 01 08/27/2021   5    Imaging: No results found  Cardiac testing:   No results found for this or any previous visit  No results found for this or any previous visit  No results found for this or any previous visit  No results found for this or any previous visit

## 2023-01-18 ENCOUNTER — HOSPITAL ENCOUNTER (OUTPATIENT)
Dept: RADIOLOGY | Facility: HOSPITAL | Age: 78
Discharge: HOME | End: 2023-01-18
Attending: FAMILY MEDICINE

## 2023-01-18 DIAGNOSIS — M79.642 PAIN IN LEFT HAND: ICD-10-CM

## 2023-02-15 ENCOUNTER — OFFICE VISIT (OUTPATIENT)
Dept: CARDIOLOGY CLINIC | Facility: CLINIC | Age: 78
End: 2023-02-15

## 2023-02-15 VITALS
HEIGHT: 69 IN | BODY MASS INDEX: 28.29 KG/M2 | DIASTOLIC BLOOD PRESSURE: 78 MMHG | SYSTOLIC BLOOD PRESSURE: 122 MMHG | WEIGHT: 191 LBS | HEART RATE: 65 BPM

## 2023-02-15 DIAGNOSIS — N18.32 STAGE 3B CHRONIC KIDNEY DISEASE (HCC): ICD-10-CM

## 2023-02-15 DIAGNOSIS — I48.0 PAROXYSMAL ATRIAL FIBRILLATION (HCC): Primary | ICD-10-CM

## 2023-02-15 DIAGNOSIS — E78.5 HYPERLIPIDEMIA, UNSPECIFIED HYPERLIPIDEMIA TYPE: ICD-10-CM

## 2023-02-15 DIAGNOSIS — I10 ESSENTIAL HYPERTENSION: ICD-10-CM

## 2023-02-15 NOTE — PROGRESS NOTES
[facility], [facility] [address]  Phone#  [phone]  Fax#  [phone]                                              Cardiology Office Follow up  Marko Whittington , 76 y o  male  YOB: 1945  MRN: [id number] Encounter: [id number]      PCP - Keri Darnell, DO    Assessment  Paroxysmal Atrial Fibrillation   Paroxysmal SVT  Zio-patch - 9/2021 - 40 short SVT episodes, upto 22 beats (12 seconds), no Afib  PACs, Irregular heart beat  Hypertension  CKD  Overweight, Body mass index is 28 21 kg/m²  Plan  Paroxysmal Atrial Fibrillation, Frequent PACs, PSVT  #1 - 5/2021 - had atrial fibrillation for 3-4 hours on holter monitor, asymptomatic, feels it was after his Moderna vaccine   tried metoprolol but was too fatigued and as a result stopped  #2 - 8/27/21 - had tachycardia noted on fitbit persistently for several hours, and went to the ED after taking metoprolol and aspirin  Initial ED ECG was NSR  He has started back on metoprolol succinate 25 mg b i d  since then, and has been otherwise feeling well  9/2021 - Zio patch  no further AFib, but frequent short, asymptomatic SVT episodes - upto 12 seconds  He was switched back from Xarelto to aspirin after this  2/2023 - he continues to do well and has not had any further episodes of atrial fibrillation over the last 2 years, although does occasionally have brief tachycardia   Impression  No recent Afib episodes, well controlled PSVT  Plan  Continue metoprolol succinate 25 mg twice daily, aspirin 81 mg twice daily   Encourage ambulation, minimize caffeine    Hypertension with CKD  Well-controlled, 122/78 today  He has gradually lost about 10 pounds and has been eating healthier and this appears to be helping his blood pressure control  Continue metoprolol succinate 25 mg b i d  Low salt diet    Hyperlipidemia, overweight - Body mass index is 28 21 kg/m²       No recent BMP or lipid panel available, although had previously reported having testing with PCP in February 2022  Recommend repeat BMP and lipid panel   Dietary modifications and exercise    ECG today -  Results for orders placed or performed in visit on 02/15/23   POCT ECG    Impression    Normal sinus rhythm        Orders Placed This Encounter   Procedures   • Basic metabolic panel   • Lipid Panel with Direct LDL reflex   • POCT ECG     Return in about 6 months (around 8/15/2023), or if symptoms worsen or fail to improve  History of Present Illness   76 y o  male comes in as a new patient for evaluation of irregular heartbeat  He is in his usual state of health, and does not report any major symptoms of chest pain, palpitations, shortness of breath  He recently received his COVID vaccination, and after this was monitoring his pulse ox, and at this time he incidentally noted some irregular heartbeat  He continued to have some irregular heartbeats, and as a result went to the ED for evaluation  In the ED, he was noted to have sinus rhythm with PACs on ECG, and was otherwise monitored overnight and discharged  Since discharge, he continues to be symptom free and has not had any major episodes of heart racing or palpitations  He states that his heart rate goes up to 120 with activity  No history of dizziness, near-syncope or syncope  Interval history - 8/30/2021  He comes back for follow-up after about 2 months  He had otherwise been feeling well since discontinuing his metoprolol, with improvement in his fatigue  As a result, he wanted to hold off on any testing, and see if xarelto also could be stopped  But per my discussion with him through my chart messages, I had asked him to continue Xarelto as his AFib episodes have been previously asymptomatic, while using metoprolol p r n  Natacha Leavitt   Unfortunately, on 08/27/2021, he again has had tachycardia noted on his Fitbit, which persisted for several hours, and as a result he decided to go to the ED  By the time EMS arrived, he had already taken aspirin and metoprolol, and with this is heart rate seem to have improved  He was observed in the ED for 3 hours, and was discharged to home with resumption of metoprolol at prior doses  no further complains of dizziness or is severe fatigue since then  No complains of dizziness or lightheadedness  Interval history - 11/11/2021  He comes back for follow-up after about 3 months  He completed the 2 week Zio patch monitor, and did not have any atrial fibrillation episodes on the same  He continues to feel well otherwise  No complains of chest pain, shortness of breath  Interval history - 2/14/2022  He comes back for follow-up after about 3 months  He has not had any further symptoms of persistent palpitations  No complains of dizziness or lightheadedness, near-syncope or syncope  He reports significant anorexia over the past few months, and has lost about 7 lb    Interval history - 8/30/2022  He comes back for follow-up after about 6 months  He has overall been doing well and has not had any major recurrences of palpitations  He does continue to monitor his heart rate with a Fitbit  He was more active doing 6000 steps/day until a couple of weeks ago    Interval history - 10/19/2022  He comes back for follow-up after about 2 months  He is here for an earlier visit due to concerns about his heart rate going quite slow at night, into the mid 40s on his Fitbit  No other symptoms of dizziness or lightheadedness, near-syncope or syncope  No symptoms of palpitations, or any persistent tachycardia  Interval history - 2/15/2023  He comes back for follow-up after about 4 months  Is been doing well overall and has not had any major issues with chest pain, shortness of breath, or palpitations  He continues to monitor himself on his Fitbit and his heart rate remains in the 60s to 70s on average    No complaints of dizziness or lightness, near-syncope  He remains reasonably active  Historical Information   Past Medical History:   Diagnosis Date   • Asthma    • CKD (chronic kidney disease)    • Hypertension    • Paroxysmal A-fib (HCC)    • Paroxysmal SVT (supraventricular tachycardia) ([facility])      Past Surgical History:   Procedure Laterality Date   • CYST REMOVAL     • TONSILLECTOMY       Family History   Problem Relation Age of Onset   • Diabetes Mother    • No Known Problems Father      Current Outpatient Medications on File Prior to Visit   Medication Sig Dispense Refill   • Apoaequorin (PREVAGEN PO) Take by mouth in the morning     • aspirin 81 mg chewable tablet Chew 81 mg every 12 (twelve) hours     • Breo Ellipta 100-25 MCG/INH inhaler      • Cholecalciferol (Vitamin D-3) 25 MCG (1000 UT) CAPS Take by mouth     • guaiFENesin (MUCINEX) 600 mg 12 hr tablet Take 1,200 mg by mouth daily     • metoprolol succinate (TOPROL-XL) 25 mg 24 hr tablet Take 1 tablet (25 mg total) by mouth every 12 (twelve) hours 180 tablet 3   • multivitamin (THERAGRAN) TABS Take 1 tablet by mouth daily     • omeprazole (PriLOSEC) 10 mg delayed release capsule Take 10 mg by mouth daily       No current facility-administered medications on file prior to visit       Allergies   Allergen Reactions   • Chlorpheniramine Other (See Comments)   • Phenylephrine Other (See Comments)     Social History     Socioeconomic History   • Marital status: /Civil Union     Spouse name: None   • Number of children: None   • Years of education: None   • Highest education level: None   Occupational History   • None   Tobacco Use   • Smoking status: Never   • Smokeless tobacco: Never   Vaping Use   • Vaping Use: Never used   Substance and Sexual Activity   • Alcohol use: Yes     Comment: social   • Drug use: Never   • Sexual activity: Never   Other Topics Concern   • None   Social History Narrative   • None     Social Determinants of Health     Financial Resource Strain: Not on file   Food Insecurity: Not on file   Transportation Needs: Not on file   Physical Activity: Not on file   Stress: Not on file   Social Connections: Not on file   Intimate Partner Violence: Not on file   Housing Stability: Not on file        Review of Systems   All other systems reviewed and are negative  Vitals:  Vitals:    02/15/23 1322   BP: 122/78   Pulse: 65   Weight: 86 6 kg (191 lb)   Height: 5' 9" (1 753 m)     BMI - Body mass index is 28 21 kg/m²  Wt Readings from Last 7 Encounters:   02/15/23 86 6 kg (191 lb)   10/19/22 83 9 kg (185 lb)   08/30/22 86 6 kg (191 lb)   02/14/22 85 1 kg (187 lb 9 6 oz)   11/11/21 87 1 kg (192 lb)   08/30/21 87 9 kg (193 lb 11 2 oz)   06/21/21 88 5 kg (195 lb)       Physical Exam  Vitals and nursing note reviewed  Constitutional:       General: He is not in acute distress  Appearance: Normal appearance  He is well-developed  He is not ill-appearing or diaphoretic  HENT:      Head: Normocephalic and atraumatic  Nose: No congestion  Eyes:      General: No scleral icterus  Conjunctiva/sclera: Conjunctivae normal    Neck:      Vascular: No carotid bruit or JVD  Cardiovascular:      Rate and Rhythm: Normal rate and regular rhythm  Heart sounds: Normal heart sounds  No murmur heard  No friction rub  No gallop  Pulmonary:      Effort: Pulmonary effort is normal  No respiratory distress  Breath sounds: Normal breath sounds  No wheezing or rales  Chest:      Chest wall: No tenderness  Abdominal:      General: There is no distension  Palpations: Abdomen is soft  Tenderness: There is no abdominal tenderness  Musculoskeletal:         General: No swelling, tenderness or deformity  Cervical back: Neck supple  No muscular tenderness  Right lower leg: No edema  Left lower leg: No edema  Skin:     General: Skin is warm  Neurological:      General: No focal deficit present        Mental Status: He is alert and oriented to person, place, and time  Mental status is at baseline  Psychiatric:         Mood and Affect: Mood normal          Behavior: Behavior normal          Thought Content: Thought content normal          Labs:  CBC:   Lab Results   Component Value Date    WBC 6 07 08/27/2021    RBC 3 90 08/27/2021    HGB 12 1 08/27/2021    HCT 36 0 (L) 08/27/2021    MCV 92 08/27/2021     08/27/2021    RDW 12 6 08/27/2021       CMP:   Lab Results   Component Value Date    K 3 6 08/27/2021     08/27/2021    CO2 23 08/27/2021    BUN 25 08/27/2021    CREATININE 2 01 (H) 08/27/2021    EGFR 31 08/27/2021    CALCIUM 8 8 08/27/2021    AST 17 08/27/2021    ALT 19 08/27/2021    ALKPHOS 64 08/27/2021       Magnesium:  No results found for: MG    Lipid Profile:   Lab Results   Component Value Date    HDL 37 (L) 07/08/2021    TRIG 123 07/08/2021    LDLCALC 141 (H) 07/08/2021       Thyroid Studies:   Lab Results   Component Value Date    [id number] 1 709 08/27/2021       No components found for: [facility] [place]    Lab Results   Component Value Date    INR 1 01 08/27/2021   5    Imaging: No results found  Cardiac testing:   No results found for this or any previous visit  No results found for this or any previous visit  No results found for this or any previous visit  No results found for this or any previous visit

## 2023-02-24 LAB
BUN SERPL-MCNC: 27 MG/DL (ref 8–27)
BUN/CREAT SERPL: 13 (ref 10–24)
CHLORIDE SERPL-SCNC: 101 MMOL/L (ref 96–106)
CHOLEST SERPL-MCNC: 194 MG/DL (ref 100–199)
CO2 SERPL-SCNC: 25 MMOL/L (ref 20–29)
CREAT SERPL-MCNC: 2.04 MG/DL (ref 0.76–1.27)
EGFR: 33 ML/MIN/1.73
GLUCOSE SERPL-MCNC: 95 MG/DL (ref 70–99)
HDLC SERPL-MCNC: 36 MG/DL
LDLC SERPL CALC-MCNC: 121 MG/DL (ref 0–99)
POTASSIUM SERPL-SCNC: 4.6 MMOL/L (ref 3.5–5.2)
SL AMB VLDL CHOLESTEROL CALC: 37 MG/DL (ref 5–40)
SODIUM SERPL-SCNC: 140 MMOL/L (ref 134–144)
TRIGL SERPL-MCNC: 211 MG/DL (ref 0–149)

## 2023-08-01 ENCOUNTER — OFFICE VISIT (OUTPATIENT)
Dept: CARDIOLOGY CLINIC | Facility: CLINIC | Age: 78
End: 2023-08-01
Payer: MEDICARE

## 2023-08-01 VITALS
SYSTOLIC BLOOD PRESSURE: 122 MMHG | HEART RATE: 60 BPM | DIASTOLIC BLOOD PRESSURE: 70 MMHG | HEIGHT: 69 IN | BODY MASS INDEX: 27.85 KG/M2 | WEIGHT: 188 LBS

## 2023-08-01 DIAGNOSIS — E66.3 OVERWEIGHT (BMI 25.0-29.9): ICD-10-CM

## 2023-08-01 DIAGNOSIS — N18.32 STAGE 3B CHRONIC KIDNEY DISEASE (HCC): ICD-10-CM

## 2023-08-01 DIAGNOSIS — I10 ESSENTIAL HYPERTENSION: ICD-10-CM

## 2023-08-01 DIAGNOSIS — I48.0 PAROXYSMAL ATRIAL FIBRILLATION (HCC): ICD-10-CM

## 2023-08-01 DIAGNOSIS — I47.1 PSVT (PAROXYSMAL SUPRAVENTRICULAR TACHYCARDIA) (HCC): Primary | ICD-10-CM

## 2023-08-01 PROCEDURE — 93000 ELECTROCARDIOGRAM COMPLETE: CPT | Performed by: INTERNAL MEDICINE

## 2023-08-01 PROCEDURE — 99213 OFFICE O/P EST LOW 20 MIN: CPT | Performed by: INTERNAL MEDICINE

## 2023-08-01 NOTE — PROGRESS NOTES
[facility], [facility] [place] [address]  Phone#  [phone]  Fax#  [phone]                                              Cardiology Office Follow up  Magnolia Arevalo, 78 y.o. male  YOB: 1945  MRN: [id number] Encounter: [id number]      PCP - [facility], DO    Assessment  Paroxysmal Atrial Fibrillation   Paroxysmal SVT  Zio-patch - 9/2021 - 40 short SVT episodes, upto 22 beats (12 seconds), no Afib  PACs, Irregular heart beat  Hypertension  CKD  Creatinine remains stable around 2  Overweight, Body mass index is 27.76 kg/m². Plan  Paroxysmal Atrial Fibrillation, Frequent PACs, PSVT  #1 - 5/2021 - had atrial fibrillation for 3-4 hours on holter monitor, asymptomatic, feels it was after his Moderna vaccine   tried metoprolol but was too fatigued and as a result stopped  #2 - 8/27/21 - had tachycardia noted on fitbit persistently for several hours, and went to the ED after taking metoprolol and aspirin. Initial ED ECG was NSR  He has started back on metoprolol succinate 25 mg b.i.d. since then, and has been otherwise feeling well  9/2021 - Zio patch  no further AFib, but frequent short, asymptomatic SVT episodes - upto 12 seconds  He was switched back from Xarelto to aspirin after this  2/2023 - he continues to do well and has not had any further episodes of atrial fibrillation over the last 2 years, although does occasionally have brief tachycardia   7/2023: Remains free of any major palpitations, and has overall been doing well without any major limitations.   He does have the cardiac monitor at home but continues to sit in a box and he has not used it at all  Impression  No recurrent Afib episodes, well controlled PAT/PVCs  Plan  Continue metoprolol succinate 25 mg twice daily continue aspirin 80 mg daily  Encouraged him to check twice daily on "[employer] although he has been using his Fitbit to monitor heart rate  Encourage ambulation, minimize caffeine    Hypertension with CKD  Well-controlled, 122/70  Continue metoprolol succinate 25 mg b.i.d. Low salt diet    Hyperlipidemia, overweight - Body mass index is 27.76 kg/m². Cholesterol levels reasonably controlled, triglycerides and LDL mildly elevated  Counseled regarding diet modifications  monitor    ECG today -  Results for orders placed or performed in visit on 08/01/23   POCT ECG    Impression    Sinus bradycardia with PACs (HR 59 bpm)        Orders Placed This Encounter   Procedures   • POCT ECG     Return in about 9 months (around 5/1/2024), or if symptoms worsen or fail to improve. History of Present Illness   78 y.o. male comes in as a new patient for evaluation of irregular heartbeat. He is in his usual state of health, and does not report any major symptoms of chest pain, palpitations, shortness of breath. He recently received his COVID vaccination, and after this was monitoring his pulse ox, and at this time he incidentally noted some irregular heartbeat. He continued to have some irregular heartbeats, and as a result went to the ED for evaluation. In the ED, he was noted to have sinus rhythm with PACs on ECG, and was otherwise monitored overnight and discharged. Since discharge, he continues to be symptom free and has not had any major episodes of heart racing or palpitations. He states that his heart rate goes up to 120 with activity. No history of dizziness, near-syncope or syncope. Interval history - 8/30/2021  He comes back for follow-up after about 2 months. He had otherwise been feeling well since discontinuing his metoprolol, with improvement in his fatigue. As a result, he wanted to hold off on any testing, and see if xarelto also could be stopped.   But per my discussion with him through my chart messages, I had asked him to continue Xarelto as his AFib episodes have been previously asymptomatic, while using metoprolol emmanuelle Barrow Unfortunately, on 08/27/2021, he again has had tachycardia noted on his Fitbit, which persisted for several hours, and as a result he decided to go to the ED. By the time EMS arrived, he had already taken aspirin and metoprolol, and with this is heart rate seem to have improved. He was observed in the ED for 3 hours, and was discharged to home with resumption of metoprolol at prior doses. no further complains of dizziness or is severe fatigue since then. No complains of dizziness or lightheadedness. Interval history - 11/11/2021  He comes back for follow-up after about 3 months. He completed the 2 week Zio patch monitor, and did not have any atrial fibrillation episodes on the same. He continues to feel well otherwise. No complains of chest pain, shortness of breath. Interval history - 2/14/2022  He comes back for follow-up after about 3 months. He has not had any further symptoms of persistent palpitations. No complains of dizziness or lightheadedness, near-syncope or syncope. He reports significant anorexia over the past few months, and has lost about 7 lb    Interval history - 8/30/2022  He comes back for follow-up after about 6 months. He has overall been doing well and has not had any major recurrences of palpitations. He does continue to monitor his heart rate with a Fitbit. He was more active doing 6000 steps/day until a couple of weeks ago    Interval history - 10/19/2022  He comes back for follow-up after about 2 months. He is here for an earlier visit due to concerns about his heart rate going quite slow at night, into the mid 40s on his Fitbit. No other symptoms of dizziness or lightheadedness, near-syncope or syncope. No symptoms of palpitations, or any persistent tachycardia. Interval history - 2/15/2023  He comes back for follow-up after about 4 months. Is been doing well overall and has not had any major issues with chest pain, shortness of breath, or palpitations. He continues to monitor himself on his Fitbit and his heart rate remains in the 60s to 70s on average. No complaints of dizziness or lightness, near-syncope. He remains reasonably active. Interval history - 8/1/2023  He comes back for follow-up after about 6 months. He continues to do well and has not had any issues with chest pain or shortness of breath, palpitations or dizziness. He remains ambulatory, but has a right foot weakness with slight foot drop limiting his ambulation. He has been unwilling to do anything regarding the same as well. Historical Information   Past Medical History:   Diagnosis Date   • Asthma    • CKD (chronic kidney disease)    • Hypertension    • Paroxysmal A-fib (HCC)    • Paroxysmal SVT (supraventricular tachycardia) (HCC)      Past Surgical History:   Procedure Laterality Date   • CYST REMOVAL     • TONSILLECTOMY       Family History   Problem Relation Age of Onset   • Diabetes Mother    • No Known Problems Father      Current Outpatient Medications on File Prior to Visit   Medication Sig Dispense Refill   • Apoaequorin (PREVAGEN PO) Take by mouth in the morning     • aspirin 81 mg chewable tablet Chew 81 mg every 12 (twelve) hours     • Breo Ellipta 100-25 MCG/INH inhaler      • Cholecalciferol (Vitamin D-3) 25 MCG (1000 UT) CAPS Take by mouth     • guaiFENesin (MUCINEX) 600 mg 12 hr tablet Take 1,200 mg by mouth daily     • metoprolol succinate (TOPROL-XL) 25 mg 24 hr tablet Take 1 tablet (25 mg total) by mouth every 12 (twelve) hours 180 tablet 3   • multivitamin (THERAGRAN) TABS Take 1 tablet by mouth daily     • omeprazole (PriLOSEC) 10 mg delayed release capsule Take 10 mg by mouth daily       No current facility-administered medications on file prior to visit.      Allergies   Allergen Reactions   • Chlorpheniramine Other (See Comments)   • Phenylephrine Other (See Comments)     Social History     Socioeconomic History   • Marital status: /Civil Union Spouse name: None   • Number of children: None   • Years of education: None   • Highest education level: None   Occupational History   • None   Tobacco Use   • Smoking status: Never   • Smokeless tobacco: Never   Vaping Use   • Vaping Use: Never used   Substance and Sexual Activity   • Alcohol use: Yes     Comment: social   • Drug use: Never   • Sexual activity: Never   Other Topics Concern   • None   Social History Narrative   • None     Social Determinants of Health     Financial Resource Strain: Not on file   Food Insecurity: Not on file   Transportation Needs: Not on file   Physical Activity: Not on file   Stress: Not on file   Social Connections: Not on file   Intimate Partner Violence: Not on file   Housing Stability: Not on file        Review of Systems   All other systems reviewed and are negative. Vitals:  Vitals:    08/01/23 1300   BP: 122/70   Pulse: 60   Weight: 85.3 kg (188 lb)   Height: 5' 9" (1.753 m)     BMI - Body mass index is 27.76 kg/m². Wt Readings from Last 7 Encounters:   08/01/23 85.3 kg (188 lb)   02/15/23 86.6 kg (191 lb)   10/19/22 83.9 kg (185 lb)   08/30/22 86.6 kg (191 lb)   02/14/22 85.1 kg (187 lb 9.6 oz)   11/11/21 87.1 kg (192 lb)   08/30/21 87.9 kg (193 lb 11.2 oz)       Physical Exam  Vitals and nursing note reviewed. Constitutional:       General: He is not in acute distress. Appearance: Normal appearance. He is well-developed. He is not ill-appearing or diaphoretic. HENT:      Head: Normocephalic and atraumatic. Nose: No congestion. Eyes:      General: No scleral icterus. Conjunctiva/sclera: Conjunctivae normal.   Neck:      Vascular: No carotid bruit or JVD. Cardiovascular:      Rate and Rhythm: Normal rate and regular rhythm. Heart sounds: Normal heart sounds. No murmur heard. No friction rub. No gallop. Pulmonary:      Effort: Pulmonary effort is normal. No respiratory distress. Breath sounds: Normal breath sounds.  No wheezing or rales.   Chest:      Chest wall: No tenderness. Abdominal:      General: There is no distension. Palpations: Abdomen is soft. Tenderness: There is no abdominal tenderness. Musculoskeletal:         General: No swelling, tenderness or deformity. Cervical back: Neck supple. No muscular tenderness. Right lower leg: No edema. Left lower leg: No edema. Skin:     General: Skin is warm. Neurological:      General: No focal deficit present. Mental Status: He is alert and oriented to person, place, and time. Mental status is at baseline. Psychiatric:         Mood and Affect: Mood normal.         Behavior: Behavior normal.         Thought Content: Thought content normal.         Labs:  CBC:   Lab Results   Component Value Date    WBC 6.07 08/27/2021    RBC 3.90 08/27/2021    HGB 12.1 08/27/2021    HCT 36.0 (L) 08/27/2021    MCV 92 08/27/2021     08/27/2021    RDW 12.6 08/27/2021       CMP:   Lab Results   Component Value Date    K 4.6 02/23/2023     02/23/2023    CO2 25 02/23/2023    BUN 27 02/23/2023    CREATININE 2.04 (H) 02/23/2023    EGFR 33 (L) 02/23/2023    CALCIUM 8.8 08/27/2021    AST 17 08/27/2021    ALT 19 08/27/2021    ALKPHOS 64 08/27/2021       Magnesium:  No results found for: "MG"    Lipid Profile:   Lab Results   Component Value Date    HDL 36 (L) 02/23/2023    TRIG 211 (H) 02/23/2023    LDLCALC 121 (H) 02/23/2023       Thyroid Studies:   Lab Results   Component Value Date    QFH1LEVGNRBT 1.709 08/27/2021       No components found for: "HGA1C"    Lab Results   Component Value Date    INR 1.01 08/27/2021   5    Imaging: No results found. Cardiac testing:   No results found for this or any previous visit. No results found for this or any previous visit. No results found for this or any previous visit. No results found for this or any previous visit.

## 2023-10-02 DIAGNOSIS — R00.2 PALPITATIONS: ICD-10-CM

## 2023-10-02 DIAGNOSIS — I48.0 PAROXYSMAL ATRIAL FIBRILLATION (HCC): ICD-10-CM

## 2023-10-03 DIAGNOSIS — I48.0 PAROXYSMAL ATRIAL FIBRILLATION (HCC): ICD-10-CM

## 2023-10-03 DIAGNOSIS — R00.2 PALPITATIONS: ICD-10-CM

## 2023-10-03 RX ORDER — METOPROLOL SUCCINATE 25 MG/1
25 TABLET, EXTENDED RELEASE ORAL EVERY 12 HOURS
Qty: 180 TABLET | Refills: 0 | OUTPATIENT
Start: 2023-10-03

## 2023-10-03 RX ORDER — METOPROLOL SUCCINATE 25 MG/1
25 TABLET, EXTENDED RELEASE ORAL EVERY 12 HOURS
Qty: 180 TABLET | Refills: 3 | Status: SHIPPED | OUTPATIENT
Start: 2023-10-03

## 2024-02-16 ENCOUNTER — HOSPITAL ENCOUNTER (INPATIENT)
Facility: HOSPITAL | Age: 79
LOS: 5 days | Discharge: HOME | DRG: 690 | End: 2024-02-21
Attending: EMERGENCY MEDICINE | Admitting: INTERNAL MEDICINE
Payer: MEDICARE

## 2024-02-16 ENCOUNTER — APPOINTMENT (EMERGENCY)
Dept: RADIOLOGY | Facility: HOSPITAL | Age: 79
DRG: 690 | End: 2024-02-16
Payer: MEDICARE

## 2024-02-16 DIAGNOSIS — N39.0 UTI (URINARY TRACT INFECTION): Primary | ICD-10-CM

## 2024-02-16 DIAGNOSIS — B95.2 BACTEREMIA DUE TO ENTEROCOCCUS: ICD-10-CM

## 2024-02-16 DIAGNOSIS — R78.81 BACTEREMIA DUE TO STAPHYLOCOCCUS: ICD-10-CM

## 2024-02-16 DIAGNOSIS — B95.8 BACTEREMIA DUE TO STAPHYLOCOCCUS: ICD-10-CM

## 2024-02-16 DIAGNOSIS — R78.81 BACTEREMIA DUE TO ENTEROCOCCUS: ICD-10-CM

## 2024-02-16 DIAGNOSIS — B95.2 ENTEROCOCCAL BACTEREMIA: ICD-10-CM

## 2024-02-16 DIAGNOSIS — R53.1 GENERALIZED WEAKNESS: ICD-10-CM

## 2024-02-16 DIAGNOSIS — R78.81 ENTEROCOCCAL BACTEREMIA: ICD-10-CM

## 2024-02-16 PROBLEM — D72.825 BANDEMIA: Status: ACTIVE | Noted: 2024-02-16

## 2024-02-16 PROBLEM — N40.1 BENIGN PROSTATIC HYPERPLASIA WITH NOCTURIA: Status: ACTIVE | Noted: 2024-02-16

## 2024-02-16 PROBLEM — N18.32 STAGE 3B CHRONIC KIDNEY DISEASE (HCC): Status: ACTIVE | Noted: 2024-02-16

## 2024-02-16 PROBLEM — R31.9 HEMATURIA: Status: ACTIVE | Noted: 2024-02-16

## 2024-02-16 PROBLEM — R35.1 BENIGN PROSTATIC HYPERPLASIA WITH NOCTURIA: Status: ACTIVE | Noted: 2024-02-16

## 2024-02-16 LAB
2HR DELTA HS TROPONIN: 3 NG/L
ALBUMIN SERPL BCP-MCNC: 4.2 G/DL (ref 3.5–5)
ALP SERPL-CCNC: 55 U/L (ref 34–104)
ALT SERPL W P-5'-P-CCNC: 8 U/L (ref 7–52)
AMORPH URATE CRY URNS QL MICRO: ABNORMAL
ANION GAP SERPL CALCULATED.3IONS-SCNC: 11 MMOL/L
AST SERPL W P-5'-P-CCNC: 14 U/L (ref 13–39)
BACTERIA UR QL AUTO: ABNORMAL /HPF
BASOPHILS # BLD MANUAL: 0 THOUSAND/UL (ref 0–0.1)
BASOPHILS NFR MAR MANUAL: 0 % (ref 0–1)
BILIRUB SERPL-MCNC: 0.67 MG/DL (ref 0.2–1)
BILIRUB UR QL STRIP: NEGATIVE
BNP SERPL-MCNC: 77 PG/ML (ref 0–100)
BUN SERPL-MCNC: 33 MG/DL (ref 5–25)
CALCIUM SERPL-MCNC: 9 MG/DL (ref 8.4–10.2)
CARDIAC TROPONIN I PNL SERPL HS: 10 NG/L
CARDIAC TROPONIN I PNL SERPL HS: 13 NG/L
CHLORIDE SERPL-SCNC: 106 MMOL/L (ref 96–108)
CLARITY UR: CLEAR
CO2 SERPL-SCNC: 22 MMOL/L (ref 21–32)
COLOR UR: ABNORMAL
CREAT SERPL-MCNC: 2.28 MG/DL (ref 0.6–1.3)
EOSINOPHIL # BLD MANUAL: 0 THOUSAND/UL (ref 0–0.4)
EOSINOPHIL NFR BLD MANUAL: 0 % (ref 0–6)
ERYTHROCYTE [DISTWIDTH] IN BLOOD BY AUTOMATED COUNT: 13 % (ref 11.6–15.1)
FLUAV RNA RESP QL NAA+PROBE: NEGATIVE
FLUBV RNA RESP QL NAA+PROBE: NEGATIVE
GFR SERPL CREATININE-BSD FRML MDRD: 26 ML/MIN/1.73SQ M
GLUCOSE SERPL-MCNC: 136 MG/DL (ref 65–140)
GLUCOSE UR STRIP-MCNC: ABNORMAL MG/DL
HCT VFR BLD AUTO: 38.2 % (ref 36.5–49.3)
HGB BLD-MCNC: 12.5 G/DL (ref 12–17)
HGB UR QL STRIP.AUTO: ABNORMAL
HYALINE CASTS #/AREA URNS LPF: ABNORMAL /LPF
KETONES UR STRIP-MCNC: NEGATIVE MG/DL
LEUKOCYTE ESTERASE UR QL STRIP: ABNORMAL
LYMPHOCYTES # BLD AUTO: 0.52 THOUSAND/UL (ref 0.6–4.47)
LYMPHOCYTES # BLD AUTO: 5 % (ref 14–44)
MCH RBC QN AUTO: 30.5 PG (ref 26.8–34.3)
MCHC RBC AUTO-ENTMCNC: 32.7 G/DL (ref 31.4–37.4)
MCV RBC AUTO: 93 FL (ref 82–98)
MONOCYTES # BLD AUTO: 0.42 THOUSAND/UL (ref 0–1.22)
MONOCYTES NFR BLD: 4 % (ref 4–12)
MUCOUS THREADS UR QL AUTO: ABNORMAL
NEUTROPHILS # BLD MANUAL: 9.52 THOUSAND/UL (ref 1.85–7.62)
NEUTS BAND NFR BLD MANUAL: 9 % (ref 0–8)
NEUTS SEG NFR BLD AUTO: 82 % (ref 43–75)
NITRITE UR QL STRIP: NEGATIVE
NON-SQ EPI CELLS URNS QL MICRO: ABNORMAL /HPF
PH UR STRIP.AUTO: 5.5 [PH]
PLATELET # BLD AUTO: 140 THOUSANDS/UL (ref 149–390)
PLATELET BLD QL SMEAR: ADEQUATE
PMV BLD AUTO: 9.4 FL (ref 8.9–12.7)
POTASSIUM SERPL-SCNC: 3.4 MMOL/L (ref 3.5–5.3)
PROT SERPL-MCNC: 7.4 G/DL (ref 6.4–8.4)
PROT UR STRIP-MCNC: ABNORMAL MG/DL
RBC # BLD AUTO: 4.1 MILLION/UL (ref 3.88–5.62)
RBC #/AREA URNS AUTO: ABNORMAL /HPF
RBC MORPH BLD: NORMAL
RSV RNA RESP QL NAA+PROBE: NEGATIVE
SARS-COV-2 RNA RESP QL NAA+PROBE: NEGATIVE
SODIUM SERPL-SCNC: 139 MMOL/L (ref 135–147)
SP GR UR STRIP.AUTO: 1.02 (ref 1–1.03)
UROBILINOGEN UR STRIP-ACNC: <2 MG/DL
WBC # BLD AUTO: 10.46 THOUSAND/UL (ref 4.31–10.16)
WBC #/AREA URNS AUTO: ABNORMAL /HPF

## 2024-02-16 PROCEDURE — 71046 X-RAY EXAM CHEST 2 VIEWS: CPT

## 2024-02-16 PROCEDURE — 80053 COMPREHEN METABOLIC PANEL: CPT

## 2024-02-16 PROCEDURE — 99285 EMERGENCY DEPT VISIT HI MDM: CPT

## 2024-02-16 PROCEDURE — 83880 ASSAY OF NATRIURETIC PEPTIDE: CPT | Performed by: EMERGENCY MEDICINE

## 2024-02-16 PROCEDURE — 87040 BLOOD CULTURE FOR BACTERIA: CPT

## 2024-02-16 PROCEDURE — 85027 COMPLETE CBC AUTOMATED: CPT

## 2024-02-16 PROCEDURE — 93005 ELECTROCARDIOGRAM TRACING: CPT

## 2024-02-16 PROCEDURE — 87186 SC STD MICRODIL/AGAR DIL: CPT

## 2024-02-16 PROCEDURE — 87077 CULTURE AEROBIC IDENTIFY: CPT

## 2024-02-16 PROCEDURE — 85007 BL SMEAR W/DIFF WBC COUNT: CPT

## 2024-02-16 PROCEDURE — 99222 1ST HOSP IP/OBS MODERATE 55: CPT | Performed by: INTERNAL MEDICINE

## 2024-02-16 PROCEDURE — 81001 URINALYSIS AUTO W/SCOPE: CPT

## 2024-02-16 PROCEDURE — 87086 URINE CULTURE/COLONY COUNT: CPT

## 2024-02-16 PROCEDURE — 36415 COLL VENOUS BLD VENIPUNCTURE: CPT

## 2024-02-16 PROCEDURE — 84484 ASSAY OF TROPONIN QUANT: CPT

## 2024-02-16 PROCEDURE — 87154 CUL TYP ID BLD PTHGN 6+ TRGT: CPT

## 2024-02-16 PROCEDURE — 0241U HB NFCT DS VIR RESP RNA 4 TRGT: CPT

## 2024-02-16 PROCEDURE — 99285 EMERGENCY DEPT VISIT HI MDM: CPT | Performed by: EMERGENCY MEDICINE

## 2024-02-16 RX ORDER — METOPROLOL SUCCINATE 25 MG/1
25 TABLET, EXTENDED RELEASE ORAL 2 TIMES DAILY
Status: DISCONTINUED | OUTPATIENT
Start: 2024-02-16 | End: 2024-02-21 | Stop reason: HOSPADM

## 2024-02-16 RX ORDER — PANTOPRAZOLE SODIUM 20 MG/1
20 TABLET, DELAYED RELEASE ORAL EVERY EVENING
Status: DISCONTINUED | OUTPATIENT
Start: 2024-02-16 | End: 2024-02-21 | Stop reason: HOSPADM

## 2024-02-16 RX ORDER — SODIUM CHLORIDE, SODIUM GLUCONATE, SODIUM ACETATE, POTASSIUM CHLORIDE, MAGNESIUM CHLORIDE, SODIUM PHOSPHATE, DIBASIC, AND POTASSIUM PHOSPHATE .53; .5; .37; .037; .03; .012; .00082 G/100ML; G/100ML; G/100ML; G/100ML; G/100ML; G/100ML; G/100ML
75 INJECTION, SOLUTION INTRAVENOUS CONTINUOUS
Status: DISPENSED | OUTPATIENT
Start: 2024-02-16 | End: 2024-02-17

## 2024-02-16 RX ORDER — HEPARIN SODIUM 5000 [USP'U]/ML
5000 INJECTION, SOLUTION INTRAVENOUS; SUBCUTANEOUS EVERY 8 HOURS SCHEDULED
Status: DISCONTINUED | OUTPATIENT
Start: 2024-02-16 | End: 2024-02-21 | Stop reason: HOSPADM

## 2024-02-16 RX ORDER — GUAIFENESIN 600 MG/1
1200 TABLET, EXTENDED RELEASE ORAL EVERY 12 HOURS PRN
Status: DISCONTINUED | OUTPATIENT
Start: 2024-02-16 | End: 2024-02-21 | Stop reason: HOSPADM

## 2024-02-16 RX ADMIN — DEXTROSE 1000 MG: 50 INJECTION, SOLUTION INTRAVENOUS at 11:52

## 2024-02-16 RX ADMIN — PANTOPRAZOLE SODIUM 20 MG: 20 TABLET, DELAYED RELEASE ORAL at 23:20

## 2024-02-16 RX ADMIN — HEPARIN SODIUM 5000 UNITS: 5000 INJECTION INTRAVENOUS; SUBCUTANEOUS at 13:01

## 2024-02-16 RX ADMIN — SODIUM CHLORIDE, SODIUM GLUCONATE, SODIUM ACETATE, POTASSIUM CHLORIDE, MAGNESIUM CHLORIDE, SODIUM PHOSPHATE, DIBASIC, AND POTASSIUM PHOSPHATE 75 ML/HR: .53; .5; .37; .037; .03; .012; .00082 INJECTION, SOLUTION INTRAVENOUS at 14:41

## 2024-02-16 RX ADMIN — HEPARIN SODIUM 5000 UNITS: 5000 INJECTION INTRAVENOUS; SUBCUTANEOUS at 23:20

## 2024-02-16 NOTE — ASSESSMENT & PLAN NOTE
Patient describing symptoms of BPH, having to get up every few hours at night to urinate  Check PVR  Consider initiation of tamsulosin if PVR elevated  Would recommend urology referral at discharge

## 2024-02-16 NOTE — ASSESSMENT & PLAN NOTE
Presents today without any prior episodes.  No history of smoking.  Most likely secondary to UTI.  Proven of hematuria with treatment of UTI, would recommend urology evaluation

## 2024-02-16 NOTE — LETTER
Thank you for allowing us to participate in the care of your patient, Mathew Sloan Jr., who was hospitalized from 2/16/2024 through 2/19/2024 with the admitting diagnosis of complicated UTI.  He was started on ceftriaxone and ampicillin due to positive Enterococcus in 1 out of 2 blood cultures.  While was unclear if this was a contaminant or not ampicillin was added on to cover for this organism.  Repeat blood culture showed no growth at 72 hours. Over the next few days he started feeling much better and stated his weakness and fatigue had improved drastically.  On the day of discharge he felt well and had no complaints of dysuria and expressed interest in returning home.  He still had frequency but he stated that this was normal for him.  He was discharged with instructions to follow-up with [facility] infusion center for daptomycin infusions for enterococcal bacteremia.    If you have any additional questions or would like to discuss further, please feel free to contact me.    Dalton Cruz MD  [facility], Hospitalist  [phone]

## 2024-02-16 NOTE — ASSESSMENT & PLAN NOTE
Lab Results   Component Value Date    EGFR 26 02/16/2024    EGFR 33 (L) 02/23/2023    EGFR 31 08/27/2021    CREATININE 2.28 (H) 02/16/2024    CREATININE 2.04 (H) 02/23/2023    CREATININE 2.01 (H) 08/27/2021     Baseline appears to be around 2.  Creatinine 2.28 on admission, not quite an ROSELYN.  See nephrology    Plan  Isolyte at 75 mL/hr for 1L  Recommend nephrology referral on discharge

## 2024-02-16 NOTE — ED PROVIDER NOTES
History  Chief Complaint   Patient presents with    Weakness - Generalized     Patient reports he woke up this morning around 0230 with chills, then had blood in his urine when he went to the bathroom. Upon getting up this morning, he was experiencing weakness and was lowered to the floor by his wife, unable to get upon his own. EMS arrived and patient was on the ground, patient denies striking his head or LOC, on ASA     (Teodoro Sloan Jr.) Mathew Sloan Jr. is a 78 y.o. male who identifies as male    They presented to the emergency department on February 16, 2024. Patient presents with:  Weakness - Generalized: Patient reports he woke up this morning around 0230 with chills, then had blood in his urine when he went to the bathroom. Upon getting up this morning, he was experiencing weakness and was lowered to the floor by his wife, unable to get upon his own. EMS arrived and patient was on the ground, patient denies striking his head or LOC, on ASA  .    The patient states that at 2:30 AM this morning, when he  woke up to go to the bathroom, it was difficult for him to pee due to some pain.  He also reports blood tinged urine.  Patient stated that while walking back to his recliner to sleep, he felt very weak. Later, while  trying to get up from the recliner he couldn't do so  due to not having enough strength to get himself up.  Patient's wife attempted to help patient getting up however due to weakness, patient was assisted to sit down on the floor. He was then unable to get up from the floor his  reason why EMS was called. Patient denies nausea, vomiting, diarrhea, fever, shortness of breath, chest pain, recent illness, or any other complaint at this time.      Allergies include:   -- Chlorpheniramine -- Other (See Comments)   -- Phenylephrine -- Other (See Comments)      Immunizations:    Immunization History  Administered            Date(s) Administered    COVID-19 MODERNA VACC 0.5 ML IM                           01/24/2021 02/20/2021    Immunizations Reviewed.          Prior to Admission Medications   Prescriptions Last Dose Informant Patient Reported? Taking?   Apoaequorin (PREVAGEN PO)   Yes No   Sig: Take by mouth in the morning   Breo Ellipta 100-25 MCG/INH inhaler   Yes No   Cholecalciferol (Vitamin D-3) 25 MCG (1000 UT) CAPS  Self Yes No   Sig: Take by mouth   aspirin 81 mg chewable tablet   Yes No   Sig: Chew 81 mg every 12 (twelve) hours   guaiFENesin (MUCINEX) 600 mg 12 hr tablet  Self Yes No   Sig: Take 1,200 mg by mouth daily   metoprolol succinate (TOPROL-XL) 25 mg 24 hr tablet   No No   Sig: Take 1 tablet (25 mg total) by mouth every 12 (twelve) hours   multivitamin (THERAGRAN) TABS  Self Yes No   Sig: Take 1 tablet by mouth daily   omeprazole (PriLOSEC) 10 mg delayed release capsule  Self Yes No   Sig: Take 10 mg by mouth daily      Facility-Administered Medications: None       Past Medical History:   Diagnosis Date    Asthma     CKD (chronic kidney disease)     Hypertension     Paroxysmal A-fib (HCC)     Paroxysmal SVT (supraventricular tachycardia)        Past Surgical History:   Procedure Laterality Date    CYST REMOVAL      TONSILLECTOMY         Family History   Problem Relation Age of Onset    Diabetes Mother     No Known Problems Father      I have reviewed and agree with the history as documented.    E-Cigarette/Vaping    E-Cigarette Use Never User      E-Cigarette/Vaping Substances    Nicotine No     THC No     CBD No     Flavoring No     Other No     Unknown No      Social History     Tobacco Use    Smoking status: Never    Smokeless tobacco: Never   Vaping Use    Vaping status: Never Used   Substance Use Topics    Alcohol use: Yes     Comment: social    Drug use: Never        Review of Systems   Constitutional:  Negative for chills and fever.   HENT:  Negative for ear pain and sore throat.    Eyes:  Negative for pain and visual disturbance.   Respiratory:  Negative for cough and shortness of  breath.    Cardiovascular:  Negative for chest pain and palpitations.   Gastrointestinal:  Negative for abdominal pain and vomiting.   Genitourinary:  Positive for hematuria (Blood-tinged urine). Negative for dysuria.   Musculoskeletal:  Negative for arthralgias and back pain.   Skin:  Negative for color change and rash.   Neurological:  Positive for weakness. Negative for seizures and syncope.   All other systems reviewed and are negative.      Physical Exam  ED Triage Vitals [02/16/24 0755]   Temperature Pulse Respirations Blood Pressure SpO2   98.9 °F (37.2 °C) 88 18 127/75 93 %      Temp Source Heart Rate Source Patient Position - Orthostatic VS BP Location FiO2 (%)   Oral Monitor Sitting Right arm --      Pain Score       No Pain             Orthostatic Vital Signs  Vitals:    02/16/24 1217 02/16/24 1251 02/16/24 1252 02/16/24 1500   BP: 110/63 111/68 111/68 110/60   Pulse: 76 77 77 78   Patient Position - Orthostatic VS:   Sitting Lying       Physical Exam  Vitals and nursing note reviewed.   Constitutional:       General: He is not in acute distress.     Appearance: He is well-developed.      Comments: Patient looks fatigued   HENT:      Head: Normocephalic and atraumatic.   Eyes:      Conjunctiva/sclera: Conjunctivae normal.   Cardiovascular:      Rate and Rhythm: Normal rate and regular rhythm.      Heart sounds: No murmur heard.  Pulmonary:      Effort: Pulmonary effort is normal. No respiratory distress.      Breath sounds: Normal breath sounds.   Abdominal:      Palpations: Abdomen is soft.      Tenderness: There is no abdominal tenderness.   Musculoskeletal:         General: No swelling.      Cervical back: Neck supple.   Skin:     General: Skin is warm and dry.      Capillary Refill: Capillary refill takes less than 2 seconds.   Neurological:      General: No focal deficit present.      Mental Status: He is alert and oriented to person, place, and time.   Psychiatric:         Mood and Affect: Mood  normal.         ED Medications  Medications   guaiFENesin (MUCINEX) 12 hr tablet 1,200 mg (has no administration in time range)   metoprolol succinate (TOPROL-XL) 24 hr tablet 25 mg (has no administration in time range)   pantoprazole (PROTONIX) EC tablet 20 mg (has no administration in time range)   heparin (porcine) subcutaneous injection 5,000 Units (5,000 Units Subcutaneous Given 2/16/24 1301)   ceftriaxone (ROCEPHIN) 1 g/50 mL in dextrose IVPB (has no administration in time range)   multi-electrolyte (PLASMALYTE-A/ISOLYTE-S PH 7.4) IV solution (75 mL/hr Intravenous New Bag 2/16/24 1441)   ceftriaxone (ROCEPHIN) 1 g/50 mL in dextrose IVPB (0 mg Intravenous Stopped 2/16/24 1441)       Diagnostic Studies  Results Reviewed       Procedure Component Value Units Date/Time    Blood culture #1 [[id number]] Collected: 02/16/24 1149    Lab Status: In process Specimen: Blood from Arm, Right Updated: 02/16/24 1158    Blood culture #2 [[id number]] Collected: 02/16/24 0813    Lab Status: In process Specimen: Blood from Arm, Left Updated: 02/16/24 1158    B-Type Natriuretic Peptide(BNP) [[id number]]  (Normal) Collected: 02/16/24 0814    Lab Status: Final result Specimen: Blood from Arm, Left Updated: 02/16/24 1149     BNP 77 pg/mL     HS Troponin I 2hr [[id number]]  (Normal) Collected: 02/16/24 1021    Lab Status: Final result Specimen: Blood from Arm, Left Updated: 02/16/24 1128     hs TnI 2hr 13 ng/L      Delta 2hr hsTnI 3 ng/L     Urine Microscopic [[id number]]  (Abnormal) Collected: 02/16/24 0949    Lab Status: Final result Specimen: Urine, Clean Catch Updated: 02/16/24 1014     RBC, UA 20-30 /hpf      WBC, UA Innumerable /hpf      Epithelial Cells Occasional /hpf      Bacteria, UA Occasional /hpf      MUCUS THREADS Occasional     Hyaline Casts, UA 0-3 /lpf      Amorphous Crystals, UA Occasional    Urine culture [[id number]] Collected: 02/16/24 0949    Lab Status: In process Specimen: Urine, Clean Catch Updated: 02/16/24 1014     UA w Reflex to Microscopic w Reflex to Culture [[id number]]  (Abnormal) Collected: 02/16/24 0949    Lab Status: Final result Specimen: Urine, Clean Catch Updated: 02/16/24 1010     Color, UA Light Yellow     Clarity, UA Clear     Specific Gravity, UA 1.016     pH, UA 5.5     Leukocytes, UA Moderate     Nitrite, UA Negative     Protein, UA 30 (1+) mg/dl      Glucose,  (1/10%) mg/dl      Ketones, UA Negative mg/dl      Urobilinogen, UA <2.0 mg/dl      Bilirubin, UA Negative     Occult Blood, UA Moderate    RBC Morphology Reflex Test [[id number]] Collected: 02/16/24 0814    Lab Status: Final result Specimen: Blood from Arm, Left Updated: 02/16/24 1001    CBC and differential [[id number]]  (Abnormal) Collected: 02/16/24 0814    Lab Status: Final result Specimen: Blood from Arm, Left Updated: 02/16/24 0946     WBC 10.46 Thousand/uL      RBC 4.10 Million/uL      Hemoglobin 12.5 g/dL      Hematocrit 38.2 %      MCV 93 fL      MCH 30.5 pg      MCHC 32.7 g/dL      RDW 13.0 %      MPV 9.4 fL      Platelets 140 Thousands/uL     Narrative:      This is an appended report.  These results have been appended to a previously verified report.    Manual Differential(PHLEBS Do Not Order) [[id number]]  (Abnormal) Collected: 02/16/24 0814    Lab Status: Final result Specimen: Blood from Arm, Left Updated: 02/16/24 0946     Segmented % 82 %      Bands % 9 %      Lymphocytes % 5 %      Monocytes % 4 %      Eosinophils, % 0 %      Basophils % 0 %      Absolute Neutrophils 9.52 Thousand/uL      Lymphocytes Absolute 0.52 Thousand/uL      Monocytes Absolute 0.42 Thousand/uL      Eosinophils Absolute 0.00 Thousand/uL      Basophils Absolute 0.00 Thousand/uL      Total Counted --     RBC Morphology Normal     Platelet Estimate Adequate    FLU/RSV/COVID - if FLU/RSV clinically relevant [[id number]]  (Normal) Collected: 02/16/24 0814    Lab Status: Final result Specimen: Nares from Nose Updated: 02/16/24 0913     SARS-CoV-2 Negative      INFLUENZA A PCR Negative     INFLUENZA B PCR Negative     RSV PCR Negative    Narrative:      FOR PEDIATRIC PATIENTS - copy/paste COVID Guidelines URL to browser: [email or web address]    SARS-CoV-2 assay is a Nucleic Acid Amplification assay intended for the  qualitative detection of nucleic acid from SARS-CoV-2 in nasopharyngeal  swabs. Results are for the presumptive identification of SARS-CoV-2 RNA.    Positive results are indicative of infection with SARS-CoV-2, the virus  causing COVID-19, but do not rule out bacterial infection or co-infection  with other viruses. Laboratories within the [place] and its  territories are required to report all positive results to the appropriate  public health authorities. Negative results do not preclude SARS-CoV-2  infection and should not be used as the sole basis for treatment or other  patient management decisions. Negative results must be combined with  clinical observations, patient history, and epidemiological information.  This test has not been FDA cleared or approved.    This test has been authorized by FDA under an Emergency Use Authorization  (EUA). This test is only authorized for the duration of time the  declaration that circumstances exist justifying the authorization of the  emergency use of an in vitro diagnostic tests for detection of SARS-CoV-2  virus and/or diagnosis of COVID-19 infection under section 564(b)(1) of  the Act, 21 U.S.C. 360bbb-3(b)(1), unless the authorization is terminated  or revoked sooner. The test has been validated but independent review by FDA  and CLIA is pending.    Test performed using [employer]: This RT-PCR assay targets N2,  a region unique to SARS-CoV-2. A conserved region in the E-gene was chosen  for pan-Sarbecovirus detection which includes SARS-CoV-2.    According to CMS-2020-01-R, this platform meets the definition of high-throughput technology.     Comprehensive metabolic panel [[id number]]  (Abnormal) Collected: 02/16/24 0814    Lab Status: Final result Specimen: Blood from Arm, Left Updated: 02/16/24 0856     Sodium 139 mmol/L      Potassium 3.4 mmol/L      Chloride 106 mmol/L      CO2 22 mmol/L      ANION GAP 11 mmol/L      BUN 33 mg/dL      Creatinine 2.28 mg/dL      Glucose 136 mg/dL      Calcium 9.0 mg/dL      AST 14 U/L      ALT 8 U/L      Alkaline Phosphatase 55 U/L      Total Protein 7.4 g/dL      Albumin 4.2 g/dL      Total Bilirubin 0.67 mg/dL      eGFR 26 ml/min/1.73sq m     Narrative:      National Kidney Disease Foundation guidelines for Chronic Kidney Disease (CKD):     Stage 1 with normal or high GFR (GFR > 90 mL/min/1.73 square meters)    Stage 2 Mild CKD (GFR = 60-89 mL/min/1.73 square meters)    Stage 3A Moderate CKD (GFR = 45-59 mL/min/1.73 square meters)    Stage 3B Moderate CKD (GFR = 30-44 mL/min/1.73 square meters)    Stage 4 Severe CKD (GFR = 15-29 mL/min/1.73 square meters)    Stage 5 End Stage CKD (GFR <15 mL/min/1.73 square meters)  Note: GFR calculation is accurate only with a steady state creatinine    HS Troponin 0hr (reflex protocol) [[id number]]  (Normal) Collected: 02/16/24 0814    Lab Status: Final result Specimen: Blood from Arm, Left Updated: 02/16/24 0856     hs TnI 0hr 10 ng/L                    XR chest 2 views   ED Interpretation by Karlee Walsh MD (02/16 0914)   This was independently interpreted by me.  No acute cardiopulmonary abnormality.  Normal costovertebral angle.  No evidence of consolidation or pleural effusion seen.  When compared to prior imaging, no significant differences noted.      Final Result by Craig Smith MD (02/16 1635)      No acute cardiopulmonary disease on this examination, which is somewhat limited secondary to low lung volumes.      Large hiatal hernia.            Resident: JERAMY GARVIN I, the attending radiologist, have reviewed the images and agree with the final  report above.      Workstation performed: [id number]               Procedures  ECG 12 Lead Documentation Only    Date/Time: 2/16/2024 8:24 AM    Performed by: Karlee Walsh MD  Authorized by: Karlee Walsh MD    Indications / Diagnosis:  Generalized weakness  ECG reviewed by me, the ED Provider: yes    Patient location:  ED  Previous ECG:     Previous ECG:  Compared to current    Comparison ECG info:  August 27, 2021    Similarity:  Changes noted    Comparison to cardiac monitor: Yes    Interpretation:     Interpretation: abnormal    Rate:     ECG rate:  81 bpm    ECG rate assessment: normal    Rhythm:     Rhythm: sinus rhythm    Ectopy:     Ectopy: none    QRS:     QRS axis:  Normal  Conduction:     Conduction: abnormal      Abnormal conduction: incomplete RBBB    ST segments:     ST segments:  Normal  T waves:     T waves: inverted      Inverted:  V1 and V4  Comments:      Ventricular rate 81 bpm, CO interval 176 ms, QRS duration 98 ms,  ms, QTc 425 ms  No acute ST segment elevation or depression.  There is evidence of incomplete right bundle branch block.  And T wave inversion in V1 and V4        ED Course  ED Course as of 02/16/24 1659   Fri Feb 16, 2024   0852 78-year-old male presented for evaluation of generalized weakness since early this morning.   0852 CBC and differential(!)  Leukocytosis of 10.46 trending down from prior.  This could be in the setting of an infectious etiology normal H&H.  Mild thrombocytopenia   0908 Comprehensive metabolic panel(!)  Mild hypokalemia 3.4.  Mild ROSELYN with a creatinine of 2.28 from 2.04.  Normal T. bili   0909 Potassium(!): 3.4   0909 BUN(!): 33   0909 Creatinine(!): 2.28   0910 hs TnI 0hr: 10   0916 Platelet Count(!): 140   0937 FLU/RSV/COVID - if FLU/RSV clinically relevant  Negative   1151 Not attempted ambulatory, patient feeling weak while walking.  Will start IV ceftriaxone for UTI.  Patient case discussed with inpatient medicine.   Patient admitted under the care of Dr. Howard for further management.   1152 hs TnI 2hr: 13  Delta troponin of 3                                       Medical Decision Making  Patient presents with:  Weakness - Generalized: Patient reports he woke up this morning around 0230 with chills, then had blood in his urine when he went to the bathroom. Upon getting up this morning, he was experiencing weakness and was lowered to the floor by his wife, unable to get upon his own. EMS arrived and patient was on the ground, patient denies striking his head or LOC, on ASA      Patient seen and examined noted to have generalized weakness.      Temp:  [97.8 °F (36.6 °C)-98.9 °F (37.2 °C)] 98 °F (36.7 °C)  HR:  [76-88] 78  Resp:  [16-20] 18  BP: (109-127)/(60-75) 110/60    Differential diagnosis includes but is not limited to ACS, electrolyte abnormality, dehydration among others.      Due to patient's history and presentation the following laboratory evidence was collected:  Recent Results (from the past 12 hour(s))  -CBC and differential:   Collection Time: 02/16/24  8:14 AM       Result                      Value             Ref Range           WBC                         10.46 (H)         4.31 - 10.16*       RBC                         4.10              3.88 - 5.62 *       Hemoglobin                  12.5              12.0 - 17.0 *       Hematocrit                  38.2              36.5 - 49.3 %       MCV                         93                82 - 98 fL          MCH                         30.5              26.8 - 34.3 *       MCHC                        32.7              31.4 - 37.4 *       RDW                         13.0              11.6 - 15.1 %       MPV                         9.4               8.9 - 12.7 fL       Platelets                   140 (L)           149 - 390 Th*  -FLU/RSV/COVID - if FLU/RSV clinically relevant:   Collection Time: 02/16/24  8:14 AM  Specimen: Nose; Nares       Result                    "   Value             Ref Range           SARS-CoV-2                  Negative          Negative            INFLUENZA A PCR             Negative          Negative            INFLUENZA B PCR             Negative          Negative            RSV PCR                     Negative          Negative       -Comprehensive metabolic panel:   Collection Time: 02/16/24  8:14 AM       Result                      Value             Ref Range           Sodium                      139               135 - 147 mm*       Potassium                   3.4 (L)           3.5 - 5.3 mm*       Chloride                    106               96 - 108 mmo*       CO2                         22                21 - 32 mmol*       ANION GAP                   11                mmol/L              BUN                         33 (H)            5 - 25 mg/dL        Creatinine                  2.28 (H)          0.60 - 1.30 *       Glucose                     136               65 - 140 mg/*       Calcium                     9.0               8.4 - 10.2 m*       AST                         14                13 - 39 U/L         ALT                         8                 7 - 52 U/L          Alkaline Phosphatase        55                34 - 104 U/L        Total Protein               7.4               6.4 - 8.4 g/*       Albumin                     4.2               3.5 - 5.0 g/*       Total Bilirubin             0.67              0.20 - 1.00 *       eGFR                        26                ml/min/1.73s*  -HS Troponin 0hr (reflex protocol):   Collection Time: 02/16/24  8:14 AM       Result                      Value             Ref Range           hs TnI 0hr                  10                \"Refer to AC*  -B-Type Natriuretic Peptide(BNP):   Collection Time: 02/16/24  8:14 AM       Result                      Value             Ref Range           BNP                         77                0 - 100 pg/mL  -Manual Differential(PHLEBS Do Not Order): "   Collection Time: 02/16/24  8:14 AM       Result                      Value             Ref Range           Segmented %                 82 (H)            43 - 75 %           Bands %                     9 (H)             0 - 8 %             Lymphocytes %               5 (L)             14 - 44 %           Monocytes %                 4                 4 - 12 %            Eosinophils, %              0                 0 - 6 %             Basophils %                 0                 0 - 1 %             Absolute Neutrophils        9.52 (H)          1.85 - 7.62 *       Lymphocytes Absolute        0.52 (L)          0.60 - 4.47 *       Monocytes Absolute          0.42              0.00 - 1.22 *       Eosinophils Absolute        0.00              0.00 - 0.40 *       Basophils Absolute          0.00              0.00 - 0.10 *       Total Counted                                                     RBC Morphology              Normal                                Platelet Estimate           Adequate          Adequate       -ECG 12 lead:   Collection Time: 02/16/24  8:14 AM       Result                      Value             Ref Range           Ventricular Rate            81                BPM                 Atrial Rate                 81                BPM                 VT Interval                 176               ms                  QRSD Interval               98                ms                  QT Interval                 366               ms                  QTC Interval                425               ms                  P Axis                      15                degrees             QRS Axis                    6                 degrees             T Wave Axis                 2                 degrees        -UA w Reflex to Microscopic w Reflex to Culture:   Collection Time: 02/16/24  9:49 AM  Specimen: Urine, Clean Catch       Result                      Value             Ref Range           Color, UA              "      Light Yellow                          Clarity, UA                 Clear                                 Specific [employer], UA        1.016             1.003 - 1.030       pH, UA                      5.5               4.5, 5.0, 5.*       Leukocytes, UA              Moderate (A)      Negative            Nitrite, UA                 Negative          Negative            Protein, UA                 30 (1+) (A)       Negative mg/*       Glucose, UA                                   Negative mg/*   100 (1/10%) (A)       Ketones, UA                 Negative          Negative mg/*       Urobilinogen, UA            <2.0              <2.0 mg/dl m*       Bilirubin, UA               Negative          Negative            Occult Blood, UA            Moderate (A)      Negative       -Urine Microscopic:   Collection Time: 02/16/24  9:49 AM       Result                      Value             Ref Range           RBC, UA                     20-30 (A)         None Seen, 1*       WBC, UA                                       None Seen, 1*   Innumerable (A)       Epithelial Cells            Occasional        None Seen, O*       Bacteria, UA                Occasional        None Seen, O*       MUCUS THREADS                                 None Seen       Occasional (A)       Hyaline Casts, UA           0-3 (A)           None Seen /l*       Amorphous Crystals, UA      Occasional                       -HS Troponin I 2hr:   Collection Time: 02/16/24 10:21 AM       Result                      Value             Ref Range           hs TnI 2hr                  13                \"Refer to AC*       Delta 2hr hsTnI             3                 <20 ng/L         Due to patient's history and presentation the following imaging was collected:  XR chest 2 views   ED Interpretation    This was independently interpreted by me.No acute cardiopulmonary abnormality.  Normal costovertebral angle.  No evidence of consolidation or pleural effusion seen.  " When compared to prior imaging, no significant differences noted.      Final Result        No acute cardiopulmonary disease on this examination, which is somewhat limited secondary to low lung volumes.        Large hiatal hernia.                Resident: JERAMY GARVIN I, the attending radiologist, have reviewed the images and agree with the final report above.        Workstation performed: [id number]         No results found.       EKG: interpreted by myself as above.    Patient was administered:      Medication Administration - last 24 hours from 02/15/2024 1657 to   02/16/2024 1657       Date/Time Order Dose Route Action Action by     02/16/2024 1441 EST ceftriaxone (ROCEPHIN) 1 g/50 mL in dextrose IVPB 0   mg Intravenous Stopped Yaneth Torres RN     02/16/2024 1152 EST ceftriaxone (ROCEPHIN) 1 g/50 mL in dextrose IVPB   1,000 mg Intravenous New Bag Courtney Lay RN     02/16/2024 1301 EST heparin (porcine) subcutaneous injection 5,000 Units   5,000 Units Subcutaneous Given Yaneth Torres RN     02/16/2024 1441 EST multi-electrolyte (PLASMALYTE-A/ISOLYTE-S PH 7.4) IV   solution 75 mL/hr Intravenous New Bag Yaneth Torres RN        Discussed patient's case with jurgen and Dr. Howard regarding admission who accepted the patient for further evaluation and management.          Amount and/or Complexity of Data Reviewed  Independent Historian: jaziel  External Data Reviewed: ECG.  Labs: ordered. Decision-making details documented in ED Course.  Radiology: ordered and independent interpretation performed.    Risk  Decision regarding hospitalization.          Disposition  Final diagnoses:   UTI (urinary tract infection)   Generalized weakness     Time reflects when diagnosis was documented in both MDM as applicable and the Disposition within this note       Time User Action Codes Description Comment    2/16/2024 11:51 AM Karlee Walsh Add [N39.0] UTI (urinary tract infection)     2/16/2024  11:51 AM Karlee Walsh Add [R53.1] Generalized weakness           ED Disposition       ED Disposition   Admit    Condition   Stable    Date/Time   Fri Feb 16, 2024 11:50 AM    Comment   Case was discussed with Jeovanny and the patient's admission status was agreed to be Admission Status: inpatient status to the service of Dr. Howard.               Follow-up Information    None         Current Discharge Medication List        CONTINUE these medications which have NOT CHANGED    Details   Apoaequorin (PREVAGEN PO) Take by mouth in the morning      aspirin 81 mg chewable tablet Chew 81 mg every 12 (twelve) hours      Breo Ellipta 100-25 MCG/INH inhaler       Cholecalciferol (Vitamin D-3) 25 MCG (1000 UT) CAPS Take by mouth      guaiFENesin (MUCINEX) 600 mg 12 hr tablet Take 1,200 mg by mouth daily      metoprolol succinate (TOPROL-XL) 25 mg 24 hr tablet Take 1 tablet (25 mg total) by mouth every 12 (twelve) hours  Qty: 180 tablet, Refills: 3    Associated Diagnoses: Paroxysmal atrial fibrillation (HCC); Palpitations      multivitamin (THERAGRAN) TABS Take 1 tablet by mouth daily      omeprazole (PriLOSEC) 10 mg delayed release capsule Take 10 mg by mouth daily           No discharge procedures on file.    PDMP Review       None             ED Provider  Attending physically available and evaluated Mathew Sloan Jr.. I managed the patient along with the ED Attending.    Electronically Signed by           Karlee Walsh MD  02/16/24 5224

## 2024-02-16 NOTE — ED ATTENDING ATTESTATION
2/16/2024  I, Frances Shah MD, saw and evaluated the patient. I have discussed the patient with the resident/non-physician practitioner and agree with the resident's/non-physician practitioner's findings, Plan of Care, and MDM as documented in the resident's/non-physician practitioner's note, except where noted. All available labs and Radiology studies were reviewed.  I was present for key portions of any procedure(s) performed by the resident/non-physician practitioner and I was immediately available to provide assistance.       At this point I agree with the current assessment done in the Emergency Department.  I have conducted an independent evaluation of this patient a history and physical is as follows:    78-year-old male with history of hypertension, atrial fibrillation, CKD.  Patient presents for evaluation of hematuria and generalized weakness.  First noticed hematuria at 2:30 AM with a second episode later in the morning.  His generalized weakness has worsened to the point that he was unable to get up out of the recliner chair that he sleeps in without assistance.  He denies falling or hitting his head.  Weakness is generalized.  No dysuria, frequency, or flank pain.  Denies nausea, vomiting, or diarrhea.    Physical Exam  Vitals and nursing note reviewed.   Constitutional:       General: He is not in acute distress.     Appearance: He is well-developed. He is not diaphoretic.      Comments: Appears generally weak   HENT:      Head: Normocephalic and atraumatic.      Right Ear: External ear normal.      Left Ear: External ear normal.      Nose: Nose normal.   Eyes:      Conjunctiva/sclera: Conjunctivae normal.   Cardiovascular:      Rate and Rhythm: Normal rate and regular rhythm.      Pulses: Normal pulses.      Heart sounds: Normal heart sounds. No murmur heard.     No friction rub. No gallop.   Pulmonary:      Effort: Pulmonary effort is normal. No respiratory distress.      Breath sounds: Normal  breath sounds. No wheezing or rales.   Abdominal:      General: Bowel sounds are normal. There is no distension.      Palpations: Abdomen is soft.      Tenderness: There is no abdominal tenderness. There is no right CVA tenderness, left CVA tenderness or guarding.   Musculoskeletal:         General: No deformity. Normal range of motion.      Cervical back: Normal range of motion and neck supple.      Right lower leg: No edema.      Left lower leg: No edema.   Skin:     General: Skin is warm and dry.   Neurological:      Mental Status: He is alert and oriented to person, place, and time.      Motor: No abnormal muscle tone.      Comments: 4+/5 strength to the bilateral extremities proximally, 5 out of 5 strength distally.  Intact sensation to light touch throughout.  Normal strength to the bilateral upper extremities.  Face symmetric, speech clear.           ED Course  ED Course as of 02/16/24 1649   Fri Feb 16, 2024   0821 I personally interpreted the patient's EKG which reveals normal rate, normal sinus rhythm, incomplete right bundle branch block new from 2021, normal intervals, T wave inversion in lead III unchanged from prior, no ST segment deviation.   0944 COVID, flu, and RSV are negative.   0945 Creatinine 2.28, slightly worse than baseline.  Initial troponin 10, will obtain delta in 2 hours.  Patient denies chest pain.  No evidence of anemia.   0945 Chest x-ray with cardiomegaly similar to prior without acute cardiopulmonary abnormalities   1028 UA with moderate leukocytes, occasional bacteria, innumerable white blood cells, 20-30 red blood cells.  Suspect hemorrhagic cystitis in the setting of UTI.  Patient's abdomen is completely benign to deep palpation, he has no CVA tenderness to suggest pyelonephritis.  Denies any abdominal or leg pain to suggest kidney stone.  Will reexamine.  If the patient is able to ambulate can be discharged home with oral antibiotics.  If he is nonambulatory will require  admission for antibiotics.   1150 Attempted ambulatory trial with the patient.  He feels too weak to walk.  Will obtain blood ultras and administer dose of ceftriaxone and admit to [facility] for further management of generalized weakness in the setting of UTI.         Critical Care Time  Procedures

## 2024-02-16 NOTE — H&P
[facility]  H&P  Name: Mathew Sloan Jr. 78 y.o. male I MRN: [id number]  Unit/Bed#: [facility] 401-01 I Date of Admission: 2/16/2024   Date of Service: 2/16/2024 I Hospital Day: 0      Assessment/Plan   * Complicated UTI (urinary tract infection)  Assessment & Plan  Symptoms began this morning with weakness, hematuria, burning with urination  No prior history of UTI  UA with innumerable WBC, occasional bacteria    Plan  Ceftriaxone 1 g daily  Follow blood and urine cultures  If no improvement would consider renal ultrasound    Stage 3b chronic kidney disease (HCC)  Assessment & Plan  Lab Results   Component Value Date    EGFR 26 02/16/2024    EGFR 33 (L) 02/23/2023    EGFR 31 08/27/2021    CREATININE 2.28 (H) 02/16/2024    CREATININE 2.04 (H) 02/23/2023    CREATININE 2.01 (H) 08/27/2021     Baseline appears to be around 2.  Creatinine 2.28 on admission, not quite an ROSELYN.  See nephrology    Plan  Isolyte at 75 mL/hr for 1L  Recommend nephrology referral on discharge    Hematuria  Assessment & Plan  Presents today without any prior episodes.  No history of smoking.  Most likely secondary to UTI.  Proven of hematuria with treatment of UTI, would recommend urology evaluation    Benign prostatic hyperplasia with nocturia  Assessment & Plan  Patient describing symptoms of BPH, having to get up every few hours at night to urinate  Check PVR  Consider initiation of tamsulosin if PVR elevated  Would recommend urology referral at discharge    Bandemia  Assessment & Plan  Present on admission with 9% bands, no other SIRS criteria.  Likely secondary to UTI    Essential hypertension  Assessment & Plan  Continue home metoprolol succinate 25 mg twice daily           VTE Pharmacologic Prophylaxis: VTE Score: 5 High Risk (Score >/= 5) - Pharmacological DVT Prophylaxis Ordered: heparin. Sequential Compression Devices Ordered.  Code Status: Level 3 - DNAR and DNI   Discussion with family: Updated   (wife) at bedside.    Anticipated Length of Stay: Patient will be admitted on an inpatient basis with an anticipated length of stay of greater than 2 midnights secondary to UTI, weakness.    Chief Complaint: Weakness, hematuria, dysuria    History of Present Illness:  Mathew Sloan Jr. is a 78 y.o. male with a PMH of hypertension, stage IIIb CKD who presents with hematuria and weakness.  Patient has been feeling generally well lately.  Woke up around 2 to 3 AM with rigors.  When he went to urinate he describes having bloody urine with pain in urination.  Has never had a UTI before.  Has not been eating well today given his symptoms.  He also describes being very weak, finding it hard to stand up.  Reports he usually gets up every 3 hours or so to urinate throughout the night.  He denies any documented fevers, lightheaded or dizziness, N/V/D, constipation.  Lab work significant for bandemia, creatinine slightly above baseline but not meeting ROSELYN criteria, UA consistent with an infection.    Review of Systems:  Review of Systems   Constitutional:  Positive for appetite change and chills.   Genitourinary:  Positive for difficulty urinating, dysuria and hematuria.   Neurological:  Positive for weakness.   All other systems reviewed and are negative.      Past Medical and Surgical History:   Past Medical History:   Diagnosis Date    Asthma     CKD (chronic kidney disease)     Hypertension     Paroxysmal A-fib (HCC)     Paroxysmal SVT (supraventricular tachycardia)        Past Surgical History:   Procedure Laterality Date    CYST REMOVAL      TONSILLECTOMY         Meds/Allergies:  Prior to Admission medications    Medication Sig Start Date End Date Taking? Authorizing Provider   Apoaequorin (PREVAGEN PO) Take by mouth in the morning    Historical Provider, MD   aspirin 81 mg chewable tablet Chew 81 mg every 12 (twelve) hours    Historical Provider, MD Kirt Lozano 100-25 MCG/INH inhaler  11/10/21   Historical  Provider, MD   Cholecalciferol (Vitamin D-3) 25 MCG (1000 UT) CAPS Take by mouth    Historical Provider, MD   guaiFENesin (MUCINEX) 600 mg 12 hr tablet Take 1,200 mg by mouth daily    Historical Provider, MD   metoprolol succinate (TOPROL-XL) 25 mg 24 hr tablet Take 1 tablet (25 mg total) by mouth every 12 (twelve) hours 10/3/23   Nimesh Perez MD   multivitamin (THERAGRAN) TABS Take 1 tablet by mouth daily    Historical Provider, MD   omeprazole (PriLOSEC) 10 mg delayed release capsule Take 10 mg by mouth daily    Historical Provider, MD KHALIL have reviewed home medications with patient personally.    Allergies:   Allergies   Allergen Reactions    Chlorpheniramine Other (See Comments)    Phenylephrine Other (See Comments)       Social History:  Marital Status: /Civil Union   Occupation:   Patient Pre-hospital Living Situation: Home  Patient Pre-hospital Level of Mobility: walks  Patient Pre-hospital Diet Restrictions:   Substance Use History:   Social History     Substance and Sexual Activity   Alcohol Use Yes    Comment: social     Social History     Tobacco Use   Smoking Status Never   Smokeless Tobacco Never     Social History     Substance and Sexual Activity   Drug Use Never       Family History:  Family History   Problem Relation Age of Onset    Diabetes Mother     No Known Problems Father        Physical Exam:     Vitals:   Blood Pressure: 111/68 (02/16/24 1252)  Pulse: 77 (02/16/24 1252)  Temperature: 97.8 °F (36.6 °C) (02/16/24 1252)  Temp Source: Oral (02/16/24 1252)  Respirations: 20 (02/16/24 1251)  SpO2: 97 % (02/16/24 1252)    Physical Exam  Constitutional:       General: He is not in acute distress.     Appearance: He is not ill-appearing.   HENT:      Mouth/Throat:      Mouth: Mucous membranes are moist.      Pharynx: Oropharynx is clear.   Cardiovascular:      Rate and Rhythm: Normal rate and regular rhythm.      Heart sounds: No murmur heard.  Pulmonary:      Effort: Pulmonary  effort is normal. No respiratory distress.      Breath sounds: Normal breath sounds.   Abdominal:      General: Abdomen is flat. There is no distension.      Palpations: Abdomen is soft.      Tenderness: There is no abdominal tenderness. There is no right CVA tenderness or left CVA tenderness.   Musculoskeletal:      Right lower leg: No edema.      Left lower leg: No edema.   Skin:     General: Skin is warm and dry.   Neurological:      Mental Status: He is alert. Mental status is at baseline.   Psychiatric:         Mood and Affect: Mood normal.         Behavior: Behavior normal.          Additional Data:     Lab Results:  Results from last 7 days   Lab Units 02/16/24  0814   WBC Thousand/uL 10.46*   HEMOGLOBIN g/dL 12.5   HEMATOCRIT % 38.2   PLATELETS Thousands/uL 140*   BANDS PCT % 9*   LYMPHO PCT % 5*   MONO PCT % 4   EOS PCT % 0     Results from last 7 days   Lab Units 02/16/24  0814   SODIUM mmol/L 139   POTASSIUM mmol/L 3.4*   CHLORIDE mmol/L 106   CO2 mmol/L 22   BUN mg/dL 33*   CREATININE mg/dL 2.28*   ANION GAP mmol/L 11   CALCIUM mg/dL 9.0   ALBUMIN g/dL 4.2   TOTAL BILIRUBIN mg/dL 0.67   ALK PHOS U/L 55   ALT U/L 8   AST U/L 14   GLUCOSE RANDOM mg/dL 136                       Lines/Drains:  Invasive Devices       Peripheral Intravenous Line  Duration             Peripheral IV 02/16/24 Proximal;Right;Ventral (anterior) Forearm <1 day                        Imaging: No pertinent imaging reviewed.  XR chest 2 views   ED Interpretation by Karlee Walsh MD (02/16 0914)   This was independently interpreted by me.  No acute cardiopulmonary abnormality.  Normal costovertebral angle.  No evidence of consolidation or pleural effusion seen.  When compared to prior imaging, no significant differences noted.          ** Please Note: This note has been constructed using a voice recognition system. **

## 2024-02-16 NOTE — ASSESSMENT & PLAN NOTE
Symptoms began this morning with weakness, hematuria, burning with urination, rigors  No prior history of UTI  UA with innumerable WBC, occasional bacteria    Plan  Ceftriaxone 1 g daily  Follow blood and urine cultures  If no improvement would consider renal ultrasound  PT OT consulted, appreciate assistance

## 2024-02-16 NOTE — PLAN OF CARE
Problem: PAIN - ADULT  Goal: Verbalizes/displays adequate comfort level or baseline comfort level  Description: Interventions:  - Encourage patient to monitor pain and request assistance  - Assess pain using appropriate pain scale  - Administer analgesics based on type and severity of pain and evaluate response  - Implement non-pharmacological measures as appropriate and evaluate response  - Consider cultural and social influences on pain and pain management  - Notify physician/advanced practitioner if interventions unsuccessful or patient reports new pain  Outcome: Progressing     Problem: INFECTION - ADULT  Goal: Absence or prevention of progression during hospitalization  Description: INTERVENTIONS:  - Assess and monitor for signs and symptoms of infection  - Monitor lab/diagnostic results  - Monitor all insertion sites, i.e. indwelling lines, tubes, and drains  - Monitor endotracheal if appropriate and nasal secretions for changes in amount and color  - [place] appropriate cooling/warming therapies per order  - Administer medications as ordered  - Instruct and encourage patient and family to use good hand hygiene technique  - Identify and instruct in appropriate isolation precautions for identified infection/condition  Outcome: Progressing  Goal: Absence of fever/infection during neutropenic period  Description: INTERVENTIONS:  - Monitor WBC    Outcome: Progressing     Problem: SAFETY ADULT  Goal: Patient will remain free of falls  Description: INTERVENTIONS:  - Educate patient/family on patient safety including physical limitations  - Instruct patient to call for assistance with activity   - Consult OT/PT to assist with strengthening/mobility   - Keep Call bell within reach  - Keep bed low and locked with side rails adjusted as appropriate  - Keep care items and personal belongings within reach  - Initiate and maintain comfort rounds  - Make Fall Risk Sign visible to staff  - Offer Toileting every 2 Hours,  in advance of need  - Obtain necessary fall risk management equipment: call bell in reach  - Apply yellow socks and bracelet for high fall risk patients  - Consider moving patient to room near nurses station  Outcome: Progressing  Goal: Maintain or return to baseline ADL function  Description: INTERVENTIONS:  -  Assess patient's ability to carry out ADLs; assess patient's baseline for ADL function and identify physical deficits which impact ability to perform ADLs (bathing, care of mouth/teeth, toileting, grooming, dressing, etc.)  - Assess/evaluate cause of self-care deficits   - Assess range of motion  - Assess patient's mobility; develop plan if impaired  - Assess patient's need for assistive devices and provide as appropriate  - Encourage maximum independence but intervene and supervise when necessary  - Involve family in performance of ADLs  - Assess for home care needs following discharge   - Consider OT consult to assist with ADL evaluation and planning for discharge  - Provide patient education as appropriate  Outcome: Progressing  Goal: Maintains/Returns to pre admission functional level  Description: INTERVENTIONS:  - Perform AM-PAC 6 Click Basic Mobility/ Daily Activity assessment daily.  - Set and communicate daily mobility goal to care team and patient/family/caregiver.   - Collaborate with rehabilitation services on mobility goals if consulted  - Perform Range of Motion 3 times a day.  - Reposition patient every 2 hours.  - Dangle patient 3 times a day  - Stand patient 3 times a day  - Ambulate patient 3 times a day  - Out of bed to chair 3 times a day   - Out of bed for meals 3 times a day  - Out of bed for toileting  - Record patient progress and toleration of activity level   Outcome: Progressing     Problem: DISCHARGE PLANNING  Goal: Discharge to home or other facility with appropriate resources  Description: INTERVENTIONS:  - Identify barriers to discharge w/patient and caregiver  - Arrange for  needed discharge resources and transportation as appropriate  - Identify discharge learning needs (meds, wound care, etc.)  - Arrange for interpretive services to assist at discharge as needed  - Refer to Case Management Department for coordinating discharge planning if the patient needs post-hospital services based on physician/advanced practitioner order or complex needs related to functional status, cognitive ability, or social support system  Outcome: Progressing     Problem: Knowledge Deficit  Goal: Patient/family/caregiver demonstrates understanding of disease process, treatment plan, medications, and discharge instructions  Description: Complete learning assessment and assess knowledge base.  Interventions:  - Provide teaching at level of understanding  - Provide teaching via preferred learning methods  Outcome: Progressing

## 2024-02-17 PROBLEM — B95.2 ENTEROCOCCAL BACTEREMIA: Status: ACTIVE | Noted: 2024-02-17

## 2024-02-17 PROBLEM — R78.81 ENTEROCOCCAL BACTEREMIA: Status: ACTIVE | Noted: 2024-02-17

## 2024-02-17 LAB
ANION GAP SERPL CALCULATED.3IONS-SCNC: 8 MMOL/L
BASOPHILS # BLD AUTO: 0.03 THOUSANDS/ÂΜL (ref 0–0.1)
BASOPHILS NFR BLD AUTO: 0 % (ref 0–1)
BUN SERPL-MCNC: 30 MG/DL (ref 5–25)
CALCIUM SERPL-MCNC: 8.8 MG/DL (ref 8.4–10.2)
CHLORIDE SERPL-SCNC: 104 MMOL/L (ref 96–108)
CO2 SERPL-SCNC: 24 MMOL/L (ref 21–32)
CREAT SERPL-MCNC: 2.24 MG/DL (ref 0.6–1.3)
E FAECALIS DNA BLD POS QL NAA+NON-PROBE: DETECTED
EOSINOPHIL # BLD AUTO: 0.07 THOUSAND/ÂΜL (ref 0–0.61)
EOSINOPHIL NFR BLD AUTO: 1 % (ref 0–6)
ERYTHROCYTE [DISTWIDTH] IN BLOOD BY AUTOMATED COUNT: 13.3 % (ref 11.6–15.1)
GFR SERPL CREATININE-BSD FRML MDRD: 26 ML/MIN/1.73SQ M
GLUCOSE SERPL-MCNC: 129 MG/DL (ref 65–140)
HCT VFR BLD AUTO: 34 % (ref 36.5–49.3)
HGB BLD-MCNC: 11 G/DL (ref 12–17)
IMM GRANULOCYTES # BLD AUTO: 0.03 THOUSAND/UL (ref 0–0.2)
IMM GRANULOCYTES NFR BLD AUTO: 0 % (ref 0–2)
LYMPHOCYTES # BLD AUTO: 0.65 THOUSANDS/ÂΜL (ref 0.6–4.47)
LYMPHOCYTES NFR BLD AUTO: 8 % (ref 14–44)
MCH RBC QN AUTO: 30.4 PG (ref 26.8–34.3)
MCHC RBC AUTO-ENTMCNC: 32.4 G/DL (ref 31.4–37.4)
MCV RBC AUTO: 94 FL (ref 82–98)
MONOCYTES # BLD AUTO: 0.36 THOUSAND/ÂΜL (ref 0.17–1.22)
MONOCYTES NFR BLD AUTO: 4 % (ref 4–12)
NEUTROPHILS # BLD AUTO: 7 THOUSANDS/ÂΜL (ref 1.85–7.62)
NEUTS SEG NFR BLD AUTO: 87 % (ref 43–75)
NRBC BLD AUTO-RTO: 0 /100 WBCS
PLATELET # BLD AUTO: 116 THOUSANDS/UL (ref 149–390)
PMV BLD AUTO: 9.6 FL (ref 8.9–12.7)
POTASSIUM SERPL-SCNC: 3.5 MMOL/L (ref 3.5–5.3)
RBC # BLD AUTO: 3.62 MILLION/UL (ref 3.88–5.62)
S EPIDERMIDIS DNA BLD POS QL NAA+NON-PRB: DETECTED
SODIUM SERPL-SCNC: 136 MMOL/L (ref 135–147)
WBC # BLD AUTO: 8.14 THOUSAND/UL (ref 4.31–10.16)

## 2024-02-17 PROCEDURE — 87040 BLOOD CULTURE FOR BACTERIA: CPT

## 2024-02-17 PROCEDURE — 99232 SBSQ HOSP IP/OBS MODERATE 35: CPT | Performed by: HOSPITALIST

## 2024-02-17 PROCEDURE — 85025 COMPLETE CBC W/AUTO DIFF WBC: CPT | Performed by: INTERNAL MEDICINE

## 2024-02-17 PROCEDURE — 80048 BASIC METABOLIC PNL TOTAL CA: CPT | Performed by: INTERNAL MEDICINE

## 2024-02-17 PROCEDURE — 97166 OT EVAL MOD COMPLEX 45 MIN: CPT

## 2024-02-17 RX ORDER — SODIUM CHLORIDE, SODIUM GLUCONATE, SODIUM ACETATE, POTASSIUM CHLORIDE, MAGNESIUM CHLORIDE, SODIUM PHOSPHATE, DIBASIC, AND POTASSIUM PHOSPHATE .53; .5; .37; .037; .03; .012; .00082 G/100ML; G/100ML; G/100ML; G/100ML; G/100ML; G/100ML; G/100ML
75 INJECTION, SOLUTION INTRAVENOUS CONTINUOUS
Status: DISPENSED | OUTPATIENT
Start: 2024-02-17 | End: 2024-02-17

## 2024-02-17 RX ORDER — TAMSULOSIN HYDROCHLORIDE 0.4 MG/1
0.4 CAPSULE ORAL
Status: DISCONTINUED | OUTPATIENT
Start: 2024-02-17 | End: 2024-02-21 | Stop reason: HOSPADM

## 2024-02-17 RX ADMIN — CEFTRIAXONE SODIUM 1000 MG: 10 INJECTION, POWDER, FOR SOLUTION INTRAVENOUS at 11:29

## 2024-02-17 RX ADMIN — TAMSULOSIN HYDROCHLORIDE 0.4 MG: 0.4 CAPSULE ORAL at 17:20

## 2024-02-17 RX ADMIN — AMPICILLIN SODIUM 2000 MG: 2 INJECTION, POWDER, FOR SOLUTION INTRAVENOUS at 17:20

## 2024-02-17 RX ADMIN — PANTOPRAZOLE SODIUM 20 MG: 20 TABLET, DELAYED RELEASE ORAL at 21:17

## 2024-02-17 RX ADMIN — METOPROLOL SUCCINATE 25 MG: 25 TABLET, EXTENDED RELEASE ORAL at 21:17

## 2024-02-17 RX ADMIN — HEPARIN SODIUM 5000 UNITS: 5000 INJECTION INTRAVENOUS; SUBCUTANEOUS at 21:17

## 2024-02-17 RX ADMIN — METOPROLOL SUCCINATE 25 MG: 25 TABLET, EXTENDED RELEASE ORAL at 09:08

## 2024-02-17 RX ADMIN — HEPARIN SODIUM 5000 UNITS: 5000 INJECTION INTRAVENOUS; SUBCUTANEOUS at 05:48

## 2024-02-17 RX ADMIN — AMPICILLIN SODIUM 2000 MG: 2 INJECTION, POWDER, FOR SOLUTION INTRAVENOUS at 06:33

## 2024-02-17 RX ADMIN — HEPARIN SODIUM 5000 UNITS: 5000 INJECTION INTRAVENOUS; SUBCUTANEOUS at 14:04

## 2024-02-17 RX ADMIN — SODIUM CHLORIDE, SODIUM GLUCONATE, SODIUM ACETATE, POTASSIUM CHLORIDE, MAGNESIUM CHLORIDE, SODIUM PHOSPHATE, DIBASIC, AND POTASSIUM PHOSPHATE 75 ML/HR: .53; .5; .37; .037; .03; .012; .00082 INJECTION, SOLUTION INTRAVENOUS at 09:08

## 2024-02-17 NOTE — PLAN OF CARE
Problem: PAIN - ADULT  Goal: Verbalizes/displays adequate comfort level or baseline comfort level  Description: Interventions:  - Encourage patient to monitor pain and request assistance  - Assess pain using appropriate pain scale  - Administer analgesics based on type and severity of pain and evaluate response  - Implement non-pharmacological measures as appropriate and evaluate response  - Consider cultural and social influences on pain and pain management  - Notify physician/advanced practitioner if interventions unsuccessful or patient reports new pain  Outcome: Progressing     Problem: INFECTION - ADULT  Goal: Absence or prevention of progression during hospitalization  Description: INTERVENTIONS:  - Assess and monitor for signs and symptoms of infection  - Monitor lab/diagnostic results  - Monitor all insertion sites, i.e. indwelling lines, tubes, and drains  - Monitor endotracheal if appropriate and nasal secretions for changes in amount and color  - [place] appropriate cooling/warming therapies per order  - Administer medications as ordered  - Instruct and encourage patient and family to use good hand hygiene technique  - Identify and instruct in appropriate isolation precautions for identified infection/condition  Outcome: Progressing  Goal: Absence of fever/infection during neutropenic period  Description: INTERVENTIONS:  - Monitor WBC    Outcome: Progressing     Problem: SAFETY ADULT  Goal: Patient will remain free of falls  Description: INTERVENTIONS:  - Educate patient/family on patient safety including physical limitations  - Instruct patient to call for assistance with activity   - Consult OT/PT to assist with strengthening/mobility   - Keep Call bell within reach  - Keep bed low and locked with side rails adjusted as appropriate  - Keep care items and personal belongings within reach  - Initiate and maintain comfort rounds  - Make Fall Risk Sign visible to staff  - Offer Toileting every  Hours,  in advance of need  - Initiate/Maintain alarm  - Obtain necessary fall risk management equipment:   - Apply yellow socks and bracelet for high fall risk patients  - Consider moving patient to room near nurses station  Outcome: Progressing  Goal: Maintain or return to baseline ADL function  Description: INTERVENTIONS:  -  Assess patient's ability to carry out ADLs; assess patient's baseline for ADL function and identify physical deficits which impact ability to perform ADLs (bathing, care of mouth/teeth, toileting, grooming, dressing, etc.)  - Assess/evaluate cause of self-care deficits   - Assess range of motion  - Assess patient's mobility; develop plan if impaired  - Assess patient's need for assistive devices and provide as appropriate  - Encourage maximum independence but intervene and supervise when necessary  - Involve family in performance of ADLs  - Assess for home care needs following discharge   - Consider OT consult to assist with ADL evaluation and planning for discharge  - Provide patient education as appropriate  Outcome: Progressing  Goal: Maintains/Returns to pre admission functional level  Description: INTERVENTIONS:  - Perform AM-PAC 6 Click Basic Mobility/ Daily Activity assessment daily.  - Set and communicate daily mobility goal to care team and patient/family/caregiver.   - Collaborate with rehabilitation services on mobility goals if consulted  - Perform Range of Motion  times a day.  - Reposition patient every  hours.  - Dangle patient  times a day  - Stand patient  times a day  - Ambulate patient  times a day  - Out of bed to chair  times a day   - Out of bed for meals times a day  - Out of bed for toileting  - Record patient progress and toleration of activity level   Outcome: Progressing     Problem: DISCHARGE PLANNING  Goal: Discharge to home or other facility with appropriate resources  Description: INTERVENTIONS:  - Identify barriers to discharge w/patient and caregiver  - Arrange for  needed discharge resources and transportation as appropriate  - Identify discharge learning needs (meds, wound care, etc.)  - Arrange for interpretive services to assist at discharge as needed  - Refer to Case Management Department for coordinating discharge planning if the patient needs post-hospital services based on physician/advanced practitioner order or complex needs related to functional status, cognitive ability, or social support system  Outcome: Progressing     Problem: Knowledge Deficit  Goal: Patient/family/caregiver demonstrates understanding of disease process, treatment plan, medications, and discharge instructions  Description: Complete learning assessment and assess knowledge base.  Interventions:  - Provide teaching at level of understanding  - Provide teaching via preferred learning methods  Outcome: Progressing

## 2024-02-17 NOTE — OCCUPATIONAL THERAPY NOTE
Occupational Therapy Evaluation     Patient Name: Mathew Sloan Jr.  Today's Date: 2/17/2024  Problem List  Principal Problem:    Complicated UTI (urinary tract infection)  Active Problems:    Essential hypertension    Bandemia    Stage 3b chronic kidney disease (HCC)    Benign prostatic hyperplasia with nocturia    Hematuria    Enterococcal bacteremia    Past Medical History  Past Medical History:   Diagnosis Date    Asthma     CKD (chronic kidney disease)     Hypertension     Paroxysmal A-fib (HCC)     Paroxysmal SVT (supraventricular tachycardia)      Past Surgical History  Past Surgical History:   Procedure Laterality Date    CYST REMOVAL      TONSILLECTOMY          02/17/24 1110   OT Last Visit   OT Visit Date 02/17/24   Note Type   Note type Evaluation   Pain Assessment   Pain Assessment Tool 0-10   Pain Score No Pain   Restrictions/Precautions   Weight Bearing Precautions Per Order No   Other Precautions Fall Risk;Multiple lines   Home Living   Type of Home House  (4-5 TIM)   Home Layout Laundry in basement;One level  (walking track in basement, bookshelves he likes to frequent in basement)   Bathroom Shower/Tub Walk-in shower   Bathroom Toilet Raised   Home Equipment Walker  (didn't use PTA)   Additional Comments Pt resides with his wife in a 1SH with 4-5 TIM and basement that he frequents.  Wife is in good health and can provide assist as needed.   Prior Function   Level of [place] Independent with functional mobility;Independent with ADLs   Lives With Spouse   IADLs Independent with driving;Independent with meal prep;Independent with medication management   Falls in the last 6 months 1 to 4  (1 fall day of admission.  Reports he couldn't get off the raised toilet and ended up on the floor)   Vocational Retired   Lifestyle   Autonomy Pt resides with his supportive wife in a 1SH with 4-5STE and basement that he goes into for IADLs.  Pt reports being completely independent with ADLs, IADLs, and  functional mobility without use of DME PTA.   Reciprocal Relationships supportive wife   Service to Others retired   Intrinsic Gratification reading, has a walking track in his basement that he used to use   ADL   Eating Assistance 5  Supervision/Setup   Grooming Assistance 5  Supervision/Setup   UB Bathing Assistance 5  Supervision/Setup   LB Bathing Assistance 4  Minimal Assistance   UB Dressing Assistance 5  Supervision/Setup   LB Dressing Assistance 4  Minimal Assistance   Toileting Assistance  5  Supervision/Setup   Bed Mobility   Supine to Sit 5  Supervision   Sit to Supine 5  Supervision   Transfers   Sit to Stand 5  Supervision   Stand to Sit 5  Supervision   Additional Comments No use of DME   Functional Mobility   Functional Mobility 4  Minimal assistance   Additional Comments Upon therapist entry, pt ambulating in room without assist, but unsteady and requires CGA for safety.  Reports significant fatigue upon returning to bed from bathroom (10 ft)   Balance   Static Sitting Fair +   Dynamic Sitting Fair   Static Standing Fair -   Dynamic Standing Fair -   Ambulatory Fair -   Activity Tolerance   Activity Tolerance Patient limited by fatigue   Nurse Made Aware Pt cleared by RN for OT evaluation and OOB mobility   RUE Assessment   RUE Assessment WFL  (generalized weakness but WFL)   LUE Assessment   LUE Assessment WFL  (generalized weakness but WFL)   Psychosocial   Psychosocial (WDL) WDL   Cognition   Overall Cognitive Status WFL   Arousal/Participation Alert;Responsive;Cooperative   Attention Within functional limits   Orientation Level Oriented X4   Memory Within functional limits   Following Commands Follows all commands and directions without difficulty   Comments Pt is pleasant and cooperative.  No significant cognitive deficits noted at this time.   Assessment   Limitation Decreased UE strength;Decreased endurance;Decreased high-level ADLs;Decreased self-care trans   Prognosis Good   Assessment Pt is  an 78 year old male seen for initial OT evaluation s/p admission to [facility] 2/16/24 with weakness, hematuria, burning with urination and dx'd with complicated UTI.  Additional active problems include: stage 3b CKD, benign prostatic hyperplasia with nocturia, bandemia, and essential HTN.  Pt resides with his supportive wife in a 1SH with 4-5STE and basement that he goes into for IADLs.  Pt reports being completely independent with ADLs, IADLs, and functional mobility without use of DME PTA.  Pt is currently functioning at a [place] level with UB ADLs, requires [place]-supervision for LB ADLs, and CGA-supervision for functional transfers/mobility without use of DME.  From an OT standpoint, recommend level III rehab resources upon d/c, use of sc, use of gb by the toilet, and inc assist with IADLs.  No further acute OT needs, and OT orders to be d/c at this time.  Recommend continued participation with self care and mobility with staff supervision/assist while hospitalized to prevent deconditioning while hospitalized.  Please re-consult OT if additional concerns arise.   Goals   Patient Goals to feel better   Discharge Recommendation   Rehab Resource Intensity Level, OT III (Minimum Resource Intensity)   Equipment Recommended Shower/Tub chair with back ($)  (gb by raised toilet)   Additional Comments  Recommend inc assist from family with IADLs   AM-PAC Daily Activity Inpatient   Lower Body Dressing 3   Bathing 3   Toileting 4   Upper Body Dressing 4   Grooming 4   Eating 4   Daily Activity Raw Score 22   Daily Activity Standardized Score (Calc for Raw Score >=11) 47.1   AM-PAC Applied Cognition Inpatient   Following a Speech/Presentation 4   Understanding Ordinary Conversation 4   Taking Medications 4   Remembering Where Things Are Placed or Put Away 4   Remembering List of 4-5 Errands 4   Taking Care of Complicated Tasks 4   Applied Cognition Raw Score 24   Applied Cognition Standardized Score 62.21     Georgina Alvarez, RISA,  OTR/L, CSRS, CBIS  [place] License [id number]  [place] License [id number]

## 2024-02-17 NOTE — ASSESSMENT & PLAN NOTE
Lab Results   Component Value Date    EGFR 26 02/17/2024    EGFR 26 02/16/2024    EGFR 33 (L) 02/23/2023    CREATININE 2.24 (H) 02/17/2024    CREATININE 2.28 (H) 02/16/2024    CREATININE 2.04 (H) 02/23/2023     Baseline appears to be around 2.  Creatinine 2.28 on admission, not quite an [place].  See nephrology    Plan  [place] at 75 mL/hr x 10 hrs   Recommend nephrology referral on discharge

## 2024-02-17 NOTE — PLAN OF CARE
Problem: PAIN - ADULT  Goal: Verbalizes/displays adequate comfort level or baseline comfort level  Description: Interventions:  - Encourage patient to monitor pain and request assistance  - Assess pain using appropriate pain scale  - Administer analgesics based on type and severity of pain and evaluate response  - Implement non-pharmacological measures as appropriate and evaluate response  - Consider cultural and social influences on pain and pain management  - Notify physician/advanced practitioner if interventions unsuccessful or patient reports new pain  2/17/2024 0935 by Reshma Martinez RN  Outcome: Progressing  2/17/2024 0934 by Reshma Martinez RN  Outcome: Progressing     Problem: INFECTION - ADULT  Goal: Absence or prevention of progression during hospitalization  Description: INTERVENTIONS:  - Assess and monitor for signs and symptoms of infection  - Monitor lab/diagnostic results  - Monitor all insertion sites, i.e. indwelling lines, tubes, and drains  - Monitor endotracheal if appropriate and nasal secretions for changes in amount and color  - [place] appropriate cooling/warming therapies per order  - Administer medications as ordered  - Instruct and encourage patient and family to use good hand hygiene technique  - Identify and instruct in appropriate isolation precautions for identified infection/condition  2/17/2024 0935 by Reshma Martinez RN  Outcome: Progressing  2/17/2024 0934 by Reshma Martinez RN  Outcome: Progressing  Goal: Absence of fever/infection during neutropenic period  Description: INTERVENTIONS:  - Monitor WBC    2/17/2024 0935 by Reshma Martinez RN  Outcome: Progressing  2/17/2024 0934 by Reshma Martinez RN  Outcome: Progressing     Problem: SAFETY ADULT  Goal: Patient will remain free of falls  Description: INTERVENTIONS:  - Educate patient/family on patient safety including physical limitations  - Instruct patient to call for assistance with activity   - Consult OT/PT to assist with  strengthening/mobility   - Keep Call bell within reach  - Keep bed low and locked with side rails adjusted as appropriate  - Keep care items and personal belongings within reach  - Initiate and maintain comfort rounds  - Make Fall Risk Sign visible to staff  - Offer Toileting every  Hours, in advance of need  - Initiate/Maintain alarm  - Obtain necessary fall risk management equipment:  - Apply yellow socks and bracelet for high fall risk patients  - Consider moving patient to room near nurses station  2/17/2024 0935 by Reshma Martinez RN  Outcome: Progressing  2/17/2024 0934 by Reshma Martinez RN  Outcome: Progressing  Goal: Maintain or return to baseline ADL function  Description: INTERVENTIONS:  -  Assess patient's ability to carry out ADLs; assess patient's baseline for ADL function and identify physical deficits which impact ability to perform ADLs (bathing, care of mouth/teeth, toileting, grooming, dressing, etc.)  - Assess/evaluate cause of self-care deficits   - Assess range of motion  - Assess patient's mobility; develop plan if impaired  - Assess patient's need for assistive devices and provide as appropriate  - Encourage maximum independence but intervene and supervise when necessary  - Involve family in performance of ADLs  - Assess for home care needs following discharge   - Consider OT consult to assist with ADL evaluation and planning for discharge  - Provide patient education as appropriate  2/17/2024 0935 by Reshma Martinez RN  Outcome: Progressing  2/17/2024 0934 by Reshma Martinez RN  Outcome: Progressing  Goal: Maintains/Returns to pre admission functional level  Description: INTERVENTIONS:  - Perform AM-PAC 6 Click Basic Mobility/ Daily Activity assessment daily.  - Set and communicate daily mobility goal to care team and patient/family/caregiver.   - Collaborate with rehabilitation services on mobility goals if consulted  - Perform Range of Motion times a day.  - Reposition patient every  hours.  - Dangle  patient  times a day  - Stand patient  times a day  - Ambulate patient  times a day  - Out of bed to chair  times a day   - Out of bed for meals  times a day  - Out of bed for toileting  - Record patient progress and toleration of activity level   2/17/2024 0935 by Reshma Martinez RN  Outcome: Progressing  2/17/2024 0934 by Reshma Martinez RN  Outcome: Progressing     Problem: DISCHARGE PLANNING  Goal: Discharge to home or other facility with appropriate resources  Description: INTERVENTIONS:  - Identify barriers to discharge w/patient and caregiver  - Arrange for needed discharge resources and transportation as appropriate  - Identify discharge learning needs (meds, wound care, etc.)  - Arrange for interpretive services to assist at discharge as needed  - Refer to Case Management Department for coordinating discharge planning if the patient needs post-hospital services based on physician/advanced practitioner order or complex needs related to functional status, cognitive ability, or social support system  2/17/2024 0935 by Reshma Martinez RN  Outcome: Progressing  2/17/2024 0934 by Reshma Martinez RN  Outcome: Progressing     Problem: Knowledge Deficit  Goal: Patient/family/caregiver demonstrates understanding of disease process, treatment plan, medications, and discharge instructions  Description: Complete learning assessment and assess knowledge base.  Interventions:  - Provide teaching at level of understanding  - Provide teaching via preferred learning methods  2/17/2024 0935 by Reshma Martinez RN  Outcome: Progressing  2/17/2024 0934 by Reshma Martinez RN  Outcome: Progressing

## 2024-02-17 NOTE — ASSESSMENT & PLAN NOTE
Symptoms began the morning of 2/16 with weakness, hematuria, burning with urination, rigors  No prior history of UTI  UA with innumerable WBC, occasional bacteria    Plan  Ceftriaxone 1 g daily  Cover enterococcus bacteremia with ampicillin  Follow blood and urine cultures  If no improvement would consider renal ultrasound  PT OT consulted, appreciate assistance

## 2024-02-17 NOTE — PROGRESS NOTES
Formerly [facility]  Progress Note  Name: Mathew Lozano I  MRN: [id number]  Unit/Bed#: [facility] 401-01 I Date of Admission: 2/16/2024   Date of Service: 2/17/2024 I Hospital Day: 1    Assessment/Plan   * Complicated UTI (urinary tract infection)  Assessment & Plan  Symptoms began the morning of 2/16 with weakness, hematuria, burning with urination, rigors  No prior history of UTI  UA with innumerable WBC, occasional bacteria    Plan  Ceftriaxone 1 g daily  Cover enterococcus bacteremia with ampicillin  Follow blood and urine cultures  If no improvement would consider renal ultrasound  PT OT consulted, appreciate assistance    Enterococcal bacteremia  Assessment & Plan  1/2 blood cultures positive for enterococcus faecalis and staph epidermidis  There is a possibility of contaminant as patient is not severely ill or septic appearing      Plan:  Cover for enterococcus with ampicillin 2 g twice daily given CKD  Follow-up with repeat blood cultures  Follow-up with first set of cultures  Monitor daily CBC      Hematuria  Assessment & Plan  Presents today without any prior episodes.  No history of smoking.  Most likely secondary to UTI.  Proven of hematuria with treatment of UTI, would recommend urology evaluation    Benign prostatic hyperplasia with nocturia  Assessment & Plan  Patient describing symptoms of BPH, having to get up every few hours at night to urinate  Check PVR  Consider initiation of tamsulosin if PVR elevated  Would recommend urology referral at discharge    Stage 3b chronic kidney disease (HCC)  Assessment & Plan  Lab Results   Component Value Date    EGFR 26 02/17/2024    EGFR 26 02/16/2024    EGFR 33 (L) 02/23/2023    CREATININE 2.24 (H) 02/17/2024    CREATININE 2.28 (H) 02/16/2024    CREATININE 2.04 (H) 02/23/2023     Baseline appears to be around 2.  Creatinine 2.28 on admission, not quite an ROSELYN.  See nephrology    Plan  Isolyte at 75 mL/hr x 10 hrs   Recommend nephrology  referral on discharge    Bandemia  Assessment & Plan  Present on admission with 9% bands, no other SIRS criteria.  Likely secondary to UTI    Essential hypertension  Assessment & Plan  Continue home metoprolol succinate 25 mg twice daily           VTE Pharmacologic Prophylaxis: VTE Score: 5 High Risk (Score >/= 5) - Pharmacological DVT Prophylaxis Ordered: heparin. Sequential Compression Devices Ordered.    Mobility:   Basic Mobility Inpatient Raw Score: 24  JH-HLM Goal: 8: Walk 250 feet or more  JH-HLM Achieved: 7: Walk 25 feet or more  HLM Goal achieved. Continue to encourage appropriate mobility.    Patient Centered Rounds: I performed bedside rounds with nursing staff today.  Discussions with Specialists or Other Care Team Provider: Attending    Education and Discussions with Family / Patient: Updated  (wife) via phone.    Current Length of Stay: 1 day(s)  Current Patient Status: Inpatient   Discharge Plan: Anticipate discharge in 48-72 hrs to pending PT and OT evaluation.    Code Status: Level 3 - DNAR and DNI    Subjective:   Patient seen and examined at the bedside this morning. No acute events overnight. Continues to endorse some dysuria but has not noticed any more blood in his urine. He does state he experiences urinary dribbling but his postvoid residual was normal.  He states he had a single episode of diarrhea this morning that was mixed with formed stool and was experience some ear ache, which could be attributed to antibiotic side effects. He denies any fever, chills, chest pain, shortness of breath, abdominal pain, nausea, vomiting.     Objective:     Vitals:   Temp (24hrs), Av.9 °F (37.2 °C), Min:98.1 °F (36.7 °C), Max:99.6 °F (37.6 °C)    Temp:  [98.1 °F (36.7 °C)-99.6 °F (37.6 °C)] 99.6 °F (37.6 °C)  HR:  [73-82] 82  Resp:  [18] 18  BP: (108-112)/(67-73) 112/73  SpO2:  [94 %] 94 %  There is no height or weight on file to calculate BMI.     Input and Output Summary (last 24  hours):     Intake/Output Summary (Last 24 hours) at 2/17/2024 1513  Last data filed at 2/17/2024 1228  Gross per 24 hour   Intake 480 ml   Output 174 ml   Net 306 ml       Physical Exam:   Physical Exam  Constitutional:       General: He is not in acute distress.     Appearance: He is not ill-appearing.   HENT:      Head: Normocephalic and atraumatic.      Right Ear: External ear normal.      Left Ear: External ear normal.      Nose: Nose normal.      Mouth/Throat:      Mouth: Mucous membranes are moist.      Pharynx: Oropharynx is clear.   Eyes:      Extraocular Movements: Extraocular movements intact.   Cardiovascular:      Rate and Rhythm: Normal rate and regular rhythm.      Pulses: Normal pulses.      Heart sounds: Normal heart sounds. No murmur heard.  Pulmonary:      Effort: Pulmonary effort is normal. No respiratory distress.      Breath sounds: Normal breath sounds.   Abdominal:      General: Abdomen is flat. Bowel sounds are normal. There is no distension.      Palpations: Abdomen is soft.      Tenderness: There is no abdominal tenderness. There is no right CVA tenderness or left CVA tenderness.   Musculoskeletal:      Right lower leg: No edema.      Left lower leg: No edema.   Skin:     General: Skin is warm and dry.   Neurological:      Mental Status: He is alert and oriented to person, place, and time. Mental status is at baseline.   Psychiatric:         Mood and Affect: Mood normal.         Behavior: Behavior normal. Behavior is cooperative.          Additional Data:     Labs:  Results from last 7 days   Lab Units 02/17/24  0443 02/16/24  0814   WBC Thousand/uL 8.14 10.46*   HEMOGLOBIN g/dL 11.0* 12.5   HEMATOCRIT % 34.0* 38.2   PLATELETS Thousands/uL 116* 140*   BANDS PCT %  --  9*   NEUTROS PCT % 87*  --    LYMPHS PCT % 8*  --    LYMPHO PCT %  --  5*   MONOS PCT % 4  --    MONO PCT %  --  4   EOS PCT % 1 0     Results from last 7 days   Lab Units 02/17/24  0443 02/16/24  0814   SODIUM mmol/L 136 139    POTASSIUM mmol/L 3.5 3.4*   CHLORIDE mmol/L 104 106   CO2 mmol/L 24 22   BUN mg/dL 30* 33*   CREATININE mg/dL 2.24* 2.28*   ANION GAP mmol/L 8 11   CALCIUM mg/dL 8.8 9.0   ALBUMIN g/dL  --  4.2   TOTAL BILIRUBIN mg/dL  --  0.67   ALK PHOS U/L  --  55   ALT U/L  --  8   AST U/L  --  14   GLUCOSE RANDOM mg/dL 129 136                       Lines/Drains:  Invasive Devices       Peripheral Intravenous Line  Duration             Peripheral IV 02/16/24 Proximal;Right;Ventral (anterior) Forearm 1 day    Peripheral IV 02/16/24 Left;Ventral (anterior) Forearm <1 day                          Imaging: Reviewed radiology reports from this admission including: chest xray    Recent Cultures (last 7 days):   Results from last 7 days   Lab Units 02/16/24  1149 02/16/24  0949 02/16/24  0813   GRAM STAIN RESULT  Gram positive cocci in pairs and chains*  --  Gram positive cocci in clusters*   URINE CULTURE   --  Culture too young- will reincubate  --        Last 24 Hours Medication List:   Current Facility-Administered Medications   Medication Dose Route Frequency Provider Last Rate    ampicillin  2,000 mg Intravenous Q12H Ervin Mujica DO 2,000 mg (02/17/24 0633)    cefTRIAXone  1,000 mg Intravenous Q24H Ab Gibson DO 1,000 mg (02/17/24 1129)    guaiFENesin  1,200 mg Oral Q12H PRN Ab Gibson DO      heparin (porcine)  5,000 Units Subcutaneous Q8H [facility] Ab Gibson DO      metoprolol succinate  25 mg Oral BID Ab Gibson DO      multi-electrolyte  75 mL/hr Intravenous Continuous Ervin Mujica DO 75 mL/hr (02/17/24 0908)    pantoprazole  20 mg Oral QPM Ab Gibson DO      tamsulosin  0.4 mg Oral Daily With Dinner Pilar Browning MD          Today, Patient Was Seen By: Ervin Mujica DO    **Please Note: This note may have been constructed using a voice recognition system.**

## 2024-02-17 NOTE — QUICK NOTE
Received results of blood cultures obtained on 2/16/24 showing gram positive cocci in pairs and chains. Patient is currently on ampicillin  and rocephin. A repeat blood culture x 2 different sites has been ordered.

## 2024-02-17 NOTE — ASSESSMENT & PLAN NOTE
1/2 blood cultures positive for enterococcus faecalis and staph epidermidis  There is a possibility of contaminant as patient is not severely ill or septic appearing      Plan:  Cover for enterococcus with ampicillin 2 g twice daily given CKD  Follow-up with repeat blood cultures  Follow-up with first set of cultures  Monitor daily CBC

## 2024-02-18 ENCOUNTER — APPOINTMENT (INPATIENT)
Dept: ULTRASOUND IMAGING | Facility: HOSPITAL | Age: 79
DRG: 690 | End: 2024-02-18
Payer: MEDICARE

## 2024-02-18 ENCOUNTER — APPOINTMENT (INPATIENT)
Dept: NON INVASIVE DIAGNOSTICS | Facility: HOSPITAL | Age: 79
DRG: 690 | End: 2024-02-18
Payer: MEDICARE

## 2024-02-18 LAB
ANION GAP SERPL CALCULATED.3IONS-SCNC: 10 MMOL/L
AORTIC ROOT: 3.6 CM
APICAL FOUR CHAMBER EJECTION FRACTION: 66 %
ASCENDING AORTA: 3.4 CM
ATRIAL RATE: 81 BPM
BSA FOR ECHO PROCEDURE: 2.01 M2
BUN SERPL-MCNC: 21 MG/DL (ref 5–25)
CALCIUM SERPL-MCNC: 8.8 MG/DL (ref 8.4–10.2)
CHLORIDE SERPL-SCNC: 106 MMOL/L (ref 96–108)
CO2 SERPL-SCNC: 22 MMOL/L (ref 21–32)
CREAT SERPL-MCNC: 2.14 MG/DL (ref 0.6–1.3)
E WAVE DECELERATION TIME: 189 MS
E/A RATIO: 0.83
ERYTHROCYTE [DISTWIDTH] IN BLOOD BY AUTOMATED COUNT: 13.2 % (ref 11.6–15.1)
FRACTIONAL SHORTENING: 27 (ref 28–44)
GFR SERPL CREATININE-BSD FRML MDRD: 28 ML/MIN/1.73SQ M
GLUCOSE SERPL-MCNC: 123 MG/DL (ref 65–140)
HCT VFR BLD AUTO: 35.1 % (ref 36.5–49.3)
HGB BLD-MCNC: 11.3 G/DL (ref 12–17)
INTERVENTRICULAR SEPTUM IN DIASTOLE (PARASTERNAL SHORT AXIS VIEW): 1.3 CM
INTERVENTRICULAR SEPTUM: 1.3 CM (ref 0.6–1.1)
LAAS-AP2: 23.2 CM2
LAAS-AP4: 25.1 CM2
LEFT ATRIUM SIZE: 2.5 CM
LEFT ATRIUM VOLUME (MOD BIPLANE): 64 ML
LEFT ATRIUM VOLUME INDEX (MOD BIPLANE): 31.8 ML/M2
LEFT INTERNAL DIMENSION IN SYSTOLE: 2.4 CM (ref 2.1–4)
LEFT VENTRICULAR INTERNAL DIMENSION IN DIASTOLE: 3.3 CM (ref 3.5–6)
LEFT VENTRICULAR POSTERIOR WALL IN END DIASTOLE: 1 CM
LEFT VENTRICULAR STROKE VOLUME: 26 ML
LVSV (TEICH): 26 ML
MCH RBC QN AUTO: 30 PG (ref 26.8–34.3)
MCHC RBC AUTO-ENTMCNC: 32.2 G/DL (ref 31.4–37.4)
MCV RBC AUTO: 93 FL (ref 82–98)
MV E'TISSUE VEL-SEP: 6 CM/S
MV PEAK A VEL: 0.71 M/S
MV PEAK E VEL: 59 CM/S
MV STENOSIS PRESSURE HALF TIME: 55 MS
MV VALVE AREA P 1/2 METHOD: 4
P AXIS: 15 DEGREES
PLATELET # BLD AUTO: 112 THOUSANDS/UL (ref 149–390)
PMV BLD AUTO: 9.5 FL (ref 8.9–12.7)
POTASSIUM SERPL-SCNC: 3.5 MMOL/L (ref 3.5–5.3)
PR INTERVAL: 176 MS
QRS AXIS: 6 DEGREES
QRSD INTERVAL: 98 MS
QT INTERVAL: 366 MS
QTC INTERVAL: 425 MS
RA PRESSURE ESTIMATED: 8 MMHG
RBC # BLD AUTO: 3.77 MILLION/UL (ref 3.88–5.62)
RIGHT ATRIUM AREA SYSTOLE A4C: 14.6 CM2
RIGHT VENTRICLE ID DIMENSION: 4 CM
RV PSP: 39 MMHG
SL CV LEFT ATRIUM LENGTH A2C: 7.6 CM
SL CV LV EF: 60
SL CV PED ECHO LEFT VENTRICLE DIASTOLIC VOLUME (MOD BIPLANE) 2D: 46 ML
SL CV PED ECHO LEFT VENTRICLE SYSTOLIC VOLUME (MOD BIPLANE) 2D: 19 ML
SODIUM SERPL-SCNC: 138 MMOL/L (ref 135–147)
T WAVE AXIS: 2 DEGREES
TR MAX PG: 31 MMHG
TR PEAK VELOCITY: 2.8 M/S
TRICUSPID ANNULAR PLANE SYSTOLIC EXCURSION: 2.2 CM
TRICUSPID VALVE PEAK REGURGITATION VELOCITY: 2.76 M/S
VENTRICULAR RATE: 81 BPM
WBC # BLD AUTO: 3.79 THOUSAND/UL (ref 4.31–10.16)

## 2024-02-18 PROCEDURE — 93010 ELECTROCARDIOGRAM REPORT: CPT | Performed by: INTERNAL MEDICINE

## 2024-02-18 PROCEDURE — 93306 TTE W/DOPPLER COMPLETE: CPT | Performed by: INTERNAL MEDICINE

## 2024-02-18 PROCEDURE — 85027 COMPLETE CBC AUTOMATED: CPT

## 2024-02-18 PROCEDURE — 93306 TTE W/DOPPLER COMPLETE: CPT

## 2024-02-18 PROCEDURE — 80048 BASIC METABOLIC PNL TOTAL CA: CPT

## 2024-02-18 PROCEDURE — 99232 SBSQ HOSP IP/OBS MODERATE 35: CPT | Performed by: INTERNAL MEDICINE

## 2024-02-18 PROCEDURE — 76775 US EXAM ABDO BACK WALL LIM: CPT

## 2024-02-18 RX ORDER — ACETAMINOPHEN 325 MG/1
650 TABLET ORAL EVERY 6 HOURS PRN
Status: DISCONTINUED | OUTPATIENT
Start: 2024-02-18 | End: 2024-02-21 | Stop reason: HOSPADM

## 2024-02-18 RX ORDER — SACCHAROMYCES BOULARDII 250 MG
250 CAPSULE ORAL 2 TIMES DAILY
Status: DISCONTINUED | OUTPATIENT
Start: 2024-02-18 | End: 2024-02-21 | Stop reason: HOSPADM

## 2024-02-18 RX ORDER — LOPERAMIDE HYDROCHLORIDE 2 MG/1
2 CAPSULE ORAL 4 TIMES DAILY PRN
Status: DISCONTINUED | OUTPATIENT
Start: 2024-02-18 | End: 2024-02-21 | Stop reason: HOSPADM

## 2024-02-18 RX ADMIN — ACETAMINOPHEN 650 MG: 325 TABLET ORAL at 18:14

## 2024-02-18 RX ADMIN — AMPICILLIN SODIUM 2000 MG: 2 INJECTION, POWDER, FOR SOLUTION INTRAVENOUS at 17:53

## 2024-02-18 RX ADMIN — Medication 250 MG: at 13:03

## 2024-02-18 RX ADMIN — METOPROLOL SUCCINATE 25 MG: 25 TABLET, EXTENDED RELEASE ORAL at 21:01

## 2024-02-18 RX ADMIN — TAMSULOSIN HYDROCHLORIDE 0.4 MG: 0.4 CAPSULE ORAL at 17:53

## 2024-02-18 RX ADMIN — PANTOPRAZOLE SODIUM 20 MG: 20 TABLET, DELAYED RELEASE ORAL at 21:01

## 2024-02-18 RX ADMIN — HEPARIN SODIUM 5000 UNITS: 5000 INJECTION INTRAVENOUS; SUBCUTANEOUS at 13:04

## 2024-02-18 RX ADMIN — METOPROLOL SUCCINATE 25 MG: 25 TABLET, EXTENDED RELEASE ORAL at 08:53

## 2024-02-18 RX ADMIN — CEFTRIAXONE SODIUM 1000 MG: 10 INJECTION, POWDER, FOR SOLUTION INTRAVENOUS at 12:19

## 2024-02-18 RX ADMIN — HEPARIN SODIUM 5000 UNITS: 5000 INJECTION INTRAVENOUS; SUBCUTANEOUS at 21:01

## 2024-02-18 RX ADMIN — Medication 250 MG: at 17:53

## 2024-02-18 RX ADMIN — AMPICILLIN SODIUM 2000 MG: 2 INJECTION, POWDER, FOR SOLUTION INTRAVENOUS at 05:15

## 2024-02-18 RX ADMIN — HEPARIN SODIUM 5000 UNITS: 5000 INJECTION INTRAVENOUS; SUBCUTANEOUS at 05:14

## 2024-02-18 NOTE — PLAN OF CARE
Problem: PAIN - ADULT  Goal: Verbalizes/displays adequate comfort level or baseline comfort level  Description: Interventions:  - Encourage patient to monitor pain and request assistance  - Assess pain using appropriate pain scale  - Administer analgesics based on type and severity of pain and evaluate response  - Implement non-pharmacological measures as appropriate and evaluate response  - Consider cultural and social influences on pain and pain management  - Notify physician/advanced practitioner if interventions unsuccessful or patient reports new pain  Outcome: Progressing     Problem: INFECTION - ADULT  Goal: Absence or prevention of progression during hospitalization  Description: INTERVENTIONS:  - Assess and monitor for signs and symptoms of infection  - Monitor lab/diagnostic results  - Monitor all insertion sites, i.e. indwelling lines, tubes, and drains  - Monitor endotracheal if appropriate and nasal secretions for changes in amount and color  - [place] appropriate cooling/warming therapies per order  - Administer medications as ordered  - Instruct and encourage patient and family to use good hand hygiene technique  - Identify and instruct in appropriate isolation precautions for identified infection/condition  Outcome: Progressing  Goal: Absence of fever/infection during neutropenic period  Description: INTERVENTIONS:  - Monitor WBC    Outcome: Progressing     Problem: SAFETY ADULT  Goal: Patient will remain free of falls  Description: INTERVENTIONS:  - Educate patient/family on patient safety including physical limitations  - Instruct patient to call for assistance with activity   - Consult OT/PT to assist with strengthening/mobility   - Keep Call bell within reach  - Keep bed low and locked with side rails adjusted as appropriate  - Keep care items and personal belongings within reach  - Initiate and maintain comfort rounds  - Make Fall Risk Sign visible to staff  - Offer Toileting every  Hours,  in advance of need  - Initiate/Maintain alarm  - Obtain necessary fall risk management equipment:  - Apply yellow socks and bracelet for high fall risk patients  - Consider moving patient to room near nurses station  Outcome: Progressing  Goal: Maintain or return to baseline ADL function  Description: INTERVENTIONS:  -  Assess patient's ability to carry out ADLs; assess patient's baseline for ADL function and identify physical deficits which impact ability to perform ADLs (bathing, care of mouth/teeth, toileting, grooming, dressing, etc.)  - Assess/evaluate cause of self-care deficits   - Assess range of motion  - Assess patient's mobility; develop plan if impaired  - Assess patient's need for assistive devices and provide as appropriate  - Encourage maximum independence but intervene and supervise when necessary  - Involve family in performance of ADLs  - Assess for home care needs following discharge   - Consider OT consult to assist with ADL evaluation and planning for discharge  - Provide patient education as appropriate  Outcome: Progressing  Goal: Maintains/Returns to pre admission functional level  Description: INTERVENTIONS:  - Perform AM-PAC 6 Click Basic Mobility/ Daily Activity assessment daily.  - Set and communicate daily mobility goal to care team and patient/family/caregiver.   - Collaborate with rehabilitation services on mobility goals if consulted  - Perform Range of Motion times a day.  - Reposition patient every  hours.  - Dangle patient  times a day  - Stand patient  times a day  - Ambulate patient  times a day  - Out of bed to chair  times a day   - Out of bed for meals  times a day  - Out of bed for toileting  - Record patient progress and toleration of activity level   Outcome: Progressing     Problem: DISCHARGE PLANNING  Goal: Discharge to home or other facility with appropriate resources  Description: INTERVENTIONS:  - Identify barriers to discharge w/patient and caregiver  - Arrange for  needed discharge resources and transportation as appropriate  - Identify discharge learning needs (meds, wound care, etc.)  - Arrange for interpretive services to assist at discharge as needed  - Refer to Case Management Department for coordinating discharge planning if the patient needs post-hospital services based on physician/advanced practitioner order or complex needs related to functional status, cognitive ability, or social support system  Outcome: Progressing     Problem: Knowledge Deficit  Goal: Patient/family/caregiver demonstrates understanding of disease process, treatment plan, medications, and discharge instructions  Description: Complete learning assessment and assess knowledge base.  Interventions:  - Provide teaching at level of understanding  - Provide teaching via preferred learning methods  Outcome: Progressing

## 2024-02-18 NOTE — PROGRESS NOTES
[facility]  Progress Note  Name: Mathew Lozano I  MRN: [id number]  Unit/Bed#: [facility] 401-01 I Date of Admission: 2/16/2024   Date of Service: 2/18/2024 I Hospital Day: 2    Assessment/Plan   * Complicated UTI (urinary tract infection)  Assessment & Plan  Symptoms began the morning of 2/16 with weakness, hematuria, burning with urination, rigors  No prior history of UTI  UA with innumerable WBC, occasional bacteria  2/18: Currently only complaining of urinary frequency. He goes about every 2 hours.     Plan  Ceftriaxone 1 g daily  Cover enterococcus bacteremia with ampicillin  Follow blood and urine cultures  If no improvement would consider renal ultrasound  PT OT consulted, appreciate assistance    Enterococcal bacteremia  Assessment & Plan  1/2 blood cultures positive for enterococcus faecalis and staph epidermidis  There is a possibility of contaminant as patient is not severely ill or septic appearing  WBC improved from 10.46 to 3.79       Plan:  Cover for enterococcus with ampicillin 2 g twice daily given CKD  Follow-up with repeat blood cultures  Follow-up with first set of cultures  Monitor daily CBC      Hematuria  Assessment & Plan  Presents today without any prior episodes.  No history of smoking.  Most likely secondary to UTI.      Benign prostatic hyperplasia with nocturia  Assessment & Plan  Patient describing symptoms of BPH, having to get up every few hours at night to urinate  Check PVR  Consider initiation of tamsulosin if PVR elevated  Would recommend urology referral at discharge    Stage 3b chronic kidney disease (HCC)  Assessment & Plan  Lab Results   Component Value Date    EGFR 28 02/18/2024    EGFR 26 02/17/2024    EGFR 26 02/16/2024    CREATININE 2.14 (H) 02/18/2024    CREATININE 2.24 (H) 02/17/2024    CREATININE 2.28 (H) 02/16/2024     Baseline appears to be around 2.  Creatinine 2.28 on admission, not quite an ROSELYN.  See nephrology    Plan  Continue to  monitor  Recommend nephrology referral on discharge    Bandemia  Assessment & Plan  Present on admission with 9% bands, no other SIRS criteria.  Likely secondary to UTI    Essential hypertension  Assessment & Plan  Continue home metoprolol succinate 25 mg twice daily         VTE Pharmacologic Prophylaxis: VTE Score: 5 High Risk (Score >/= 5) - Pharmacological DVT Prophylaxis Ordered: heparin. Sequential Compression Devices Ordered.    Mobility:   Basic Mobility Inpatient Raw Score: 24  JH-HLM Goal: 8: Walk 250 feet or more  JH-HLM Achieved: 7: Walk 25 feet or more  HLM Goal NOT achieved. Continue with multidisciplinary rounding and encourage appropriate mobility to improve upon HLM goals.    Patient Centered Rounds: I performed bedside rounds with nursing staff today.  Discussions with Specialists or Other Care Team Provider: None    Education and Discussions with Family / Patient: Attempted to update  (wife) via phone. Unable to contact.    Current Length of Stay: 2 day(s)  Current Patient Status: Inpatient   Discharge Plan: Anticipate discharge tomorrow to home.    Code Status: Level 3 - DNAR and DNI    Subjective:   No acute events ON.  Patient reports his weakness is improved.  Feels that he is 75% better.  He complains that he still is frequently urinating at night around every 2 hours.  He denies chest pain, shortness of breath, nausea, diarrhea, vomiting, constipation.    Objective:     Vitals:   Temp (24hrs), Av.6 °F (37 °C), Min:97.8 °F (36.6 °C), Max:99 °F (37.2 °C)    Temp:  [97.8 °F (36.6 °C)-99 °F (37.2 °C)] 97.8 °F (36.6 °C)  HR:  [70-82] 70  Resp:  [18] 18  BP: (110-119)/(68-74) 119/74  SpO2:  [94 %-95 %] 95 %  There is no height or weight on file to calculate BMI.     Input and Output Summary (last 24 hours):     Intake/Output Summary (Last 24 hours) at 2024 0826  Last data filed at 2024 0435  Gross per 24 hour   Intake 240 ml   Output 250 ml   Net -10 ml       Physical  Exam:   Physical Exam  Constitutional:       General: He is not in acute distress.     Appearance: He is normal weight. He is not ill-appearing or toxic-appearing.   HENT:      Head: Normocephalic and atraumatic.      Right Ear: External ear normal.      Left Ear: External ear normal.      Nose: Nose normal.      Mouth/Throat:      Mouth: Mucous membranes are moist.      Pharynx: Oropharynx is clear.   Eyes:      Extraocular Movements: Extraocular movements intact.      Conjunctiva/sclera: Conjunctivae normal.   Cardiovascular:      Rate and Rhythm: Normal rate and regular rhythm.      Pulses: Normal pulses.      Heart sounds: Normal heart sounds. No murmur heard.  Pulmonary:      Effort: Pulmonary effort is normal. No respiratory distress.      Breath sounds: Normal breath sounds. No wheezing, rhonchi or rales.   Abdominal:      General: Abdomen is flat. Bowel sounds are normal. There is no distension.      Palpations: Abdomen is soft.      Tenderness: There is no abdominal tenderness. There is no right CVA tenderness, left CVA tenderness, guarding or rebound.   Musculoskeletal:         General: No swelling or tenderness.      Right lower leg: No edema.      Left lower leg: No edema.   Skin:     General: Skin is warm and dry.      Capillary Refill: Capillary refill takes less than 2 seconds.   Neurological:      Mental Status: He is alert and oriented to person, place, and time. Mental status is at baseline.   Psychiatric:         Mood and Affect: Mood normal.         Behavior: Behavior normal. Behavior is cooperative.          Additional Data:     Labs:  Results from last 7 days   Lab Units 02/18/24  0429 02/17/24  0443 02/16/24  0814   WBC Thousand/uL 3.79* 8.14 10.46*   HEMOGLOBIN g/dL 11.3* 11.0* 12.5   HEMATOCRIT % 35.1* 34.0* 38.2   PLATELETS Thousands/uL 112* 116* 140*   BANDS PCT %  --   --  9*   NEUTROS PCT %  --  87*  --    LYMPHS PCT %  --  8*  --    LYMPHO PCT %  --   --  5*   MONOS PCT %  --  4  --     MONO PCT %  --   --  4   EOS PCT %  --  1 0     Results from last 7 days   Lab Units 02/18/24  0429 02/17/24  0443 02/16/24  0814   SODIUM mmol/L 138   < > 139   POTASSIUM mmol/L 3.5   < > 3.4*   CHLORIDE mmol/L 106   < > 106   CO2 mmol/L 22   < > 22   BUN mg/dL 21   < > 33*   CREATININE mg/dL 2.14*   < > 2.28*   ANION GAP mmol/L 10   < > 11   CALCIUM mg/dL 8.8   < > 9.0   ALBUMIN g/dL  --   --  4.2   TOTAL BILIRUBIN mg/dL  --   --  0.67   ALK PHOS U/L  --   --  55   ALT U/L  --   --  8   AST U/L  --   --  14   GLUCOSE RANDOM mg/dL 123   < > 136    < > = values in this interval not displayed.                       Lines/Drains:  Invasive Devices       Peripheral Intravenous Line  Duration             Peripheral IV 02/16/24 Left;Ventral (anterior) Forearm 1 day    Peripheral IV 02/16/24 Proximal;Right;Ventral (anterior) Forearm 1 day                          Imaging: No pertinent imaging reviewed.    Recent Cultures (last 7 days):   Results from last 7 days   Lab Units 02/17/24  0858 02/16/24  1149 02/16/24  0949 02/16/24  0813   BLOOD CULTURE  Received in Microbiology Lab. Culture in Progress.  Received in Microbiology Lab. Culture in Progress.  --   --   --    GRAM STAIN RESULT   --  Gram positive cocci in pairs and chains*  --  Gram positive cocci in clusters*   URINE CULTURE   --   --  Culture too young- will reincubate  --        Last 24 Hours Medication List:   Current Facility-Administered Medications   Medication Dose Route Frequency Provider Last Rate    ampicillin  2,000 mg Intravenous Q12H Ervin Mujica, DO 2,000 mg (02/18/24 0515)    cefTRIAXone  1,000 mg Intravenous Q24H Ab Gibson DO 1,000 mg (02/17/24 1129)    guaiFENesin  1,200 mg Oral Q12H PRN Ab Gibson DO      heparin (porcine)  5,000 Units Subcutaneous Q8H [facility] Ab Gibson DO      metoprolol succinate  25 mg Oral BID Ab Gibson DO      pantoprazole  20 mg Oral QPM Ab Guadalupe  DO Paige      tamsulosin  0.4 mg Oral Daily With Dinner Pilar Browning MD          Today, Patient Was Seen By: Dalton Cruz MD    **Please Note: This note may have been constructed using a voice recognition system.**

## 2024-02-18 NOTE — CASE MANAGEMENT
Case Management Discharge Planning Note    Patient name Mathew Sloan Jr.  Location W /W -01 MRN [id number]  : 1945 Date 2024       Current Admission Date: 2024  Current Admission Diagnosis:Complicated UTI (urinary tract infection)   Patient Active Problem List    Diagnosis Date Noted    Enterococcal bacteremia 2024    Complicated UTI (urinary tract infection) 2024    Bandemia 2024    Stage 3b chronic kidney disease (HCC) 2024    Benign prostatic hyperplasia with nocturia 2024    Hematuria 2024    Essential hypertension 2021    ROSELYN (acute kidney injury) (HCC) 2021    Elevated glucose level 2021    Palpitations 2021    YESENIA (obstructive sleep apnea) 2021    Closed nondisplaced fracture of greater tuberosity of right humerus 2019    Closed fracture of rim of glenoid fossa of right scapula 2019      LOS (days): 2  Geometric Mean LOS (GMLOS) (days): 2.9  Days to GMLOS:0.9     OBJECTIVE:  Risk of Unplanned Readmission Score: 9.43         Current admission status: Inpatient   Preferred Pharmacy:   [facility] [address] [place], [place] [address]  Phone: [phone] Fax: [phone]    Primary Care Provider: Nick Haskins DO    Primary Insurance: MEDICARE  Secondary Insurance: COMMERCIAL MISCELLANEOUS    DISCHARGE DETAILS:    Discharge planning discussed with:: Patient  Freedom of Choice: Yes  Comments - Freedom of Choice: CM met with Pt to discussed HHC services versus outpatient therapy. Pt reported he would prefer outpatient therapy to a location close to his home and his spouse will drive. Pt also reported his spouse will transport upon d/c.       Requested Home Health Care         Is the patient interested in HHC at discharge?: No    DME Referral Provided  Referral made for DME?: No    Treatment Team Recommendation: Home  Discharge Destination  Plan:: Home  Transport at Discharge : Family

## 2024-02-18 NOTE — ASSESSMENT & PLAN NOTE
Presents today without any prior episodes.  No history of smoking.  Most likely secondary to UTI.

## 2024-02-18 NOTE — ASSESSMENT & PLAN NOTE
Lab Results   Component Value Date    EGFR 28 02/18/2024    EGFR 26 02/17/2024    EGFR 26 02/16/2024    CREATININE 2.14 (H) 02/18/2024    CREATININE 2.24 (H) 02/17/2024    CREATININE 2.28 (H) 02/16/2024     Baseline appears to be around 2.  Creatinine 2.28 on admission, not quite an ROSELYN.  See nephrology    Plan  Continue to monitor  Recommend nephrology referral on discharge

## 2024-02-18 NOTE — PLAN OF CARE
Problem: PAIN - ADULT  Goal: Verbalizes/displays adequate comfort level or baseline comfort level  Description: Interventions:  - Encourage patient to monitor pain and request assistance  - Assess pain using appropriate pain scale  - Administer analgesics based on type and severity of pain and evaluate response  - Implement non-pharmacological measures as appropriate and evaluate response  - Consider cultural and social influences on pain and pain management  - Notify physician/advanced practitioner if interventions unsuccessful or patient reports new pain  Outcome: Progressing     Problem: INFECTION - ADULT  Goal: Absence or prevention of progression during hospitalization  Description: INTERVENTIONS:  - Assess and monitor for signs and symptoms of infection  - Monitor lab/diagnostic results  - Monitor all insertion sites, i.e. indwelling lines, tubes, and drains  - Monitor endotracheal if appropriate and nasal secretions for changes in amount and color  - [place] appropriate cooling/warming therapies per order  - Administer medications as ordered  - Instruct and encourage patient and family to use good hand hygiene technique  - Identify and instruct in appropriate isolation precautions for identified infection/condition  Outcome: Progressing  Goal: Absence of fever/infection during neutropenic period  Description: INTERVENTIONS:  - Monitor WBC    Outcome: Progressing     Problem: SAFETY ADULT  Goal: Patient will remain free of falls  Description: INTERVENTIONS:  - Educate patient/family on patient safety including physical limitations  - Instruct patient to call for assistance with activity   - Consult OT/PT to assist with strengthening/mobility   - Keep Call bell within reach  - Keep bed low and locked with side rails adjusted as appropriate  - Keep care items and personal belongings within reach  - Initiate and maintain comfort rounds  - Make Fall Risk Sign visible to staff  - Offer Toileting every  Hours,  in advance of need  - Initiate/Maintain alarm  - Obtain necessary fall risk management equipment:  - Apply yellow socks and bracelet for high fall risk patients  - Consider moving patient to room near nurses station  Outcome: Progressing  Goal: Maintain or return to baseline ADL function  Description: INTERVENTIONS:  -  Assess patient's ability to carry out ADLs; assess patient's baseline for ADL function and identify physical deficits which impact ability to perform ADLs (bathing, care of mouth/teeth, toileting, grooming, dressing, etc.)  - Assess/evaluate cause of self-care deficits   - Assess range of motion  - Assess patient's mobility; develop plan if impaired  - Assess patient's need for assistive devices and provide as appropriate  - Encourage maximum independence but intervene and supervise when necessary  - Involve family in performance of ADLs  - Assess for home care needs following discharge   - Consider OT consult to assist with ADL evaluation and planning for discharge  - Provide patient education as appropriate  Outcome: Progressing  Goal: Maintains/Returns to pre admission functional level  Description: INTERVENTIONS:  - Perform AM-PAC 6 Click Basic Mobility/ Daily Activity assessment daily.  - Set and communicate daily mobility goal to care team and patient/family/caregiver.   - Collaborate with rehabilitation services on mobility goals if consulted  - Perform Range of Motion times a day.  - Reposition patient every  hours.  - Dangle patient  times a day  - Stand patient  times a day  - Ambulate patient  times a day  - Out of bed to chair  times a day   - Out of bed for meals  times a day  - Out of bed for toileting  - Record patient progress and toleration of activity level   Outcome: Progressing     Problem: DISCHARGE PLANNING  Goal: Discharge to home or other facility with appropriate resources  Description: INTERVENTIONS:  - Identify barriers to discharge w/patient and caregiver  - Arrange for  needed discharge resources and transportation as appropriate  - Identify discharge learning needs (meds, wound care, etc.)  - Arrange for interpretive services to assist at discharge as needed  - Refer to Case Management Department for coordinating discharge planning if the patient needs post-hospital services based on physician/advanced practitioner order or complex needs related to functional status, cognitive ability, or social support system  Outcome: Progressing     Problem: Knowledge Deficit  Goal: Patient/family/caregiver demonstrates understanding of disease process, treatment plan, medications, and discharge instructions  Description: Complete learning assessment and assess knowledge base.  Interventions:  - Provide teaching at level of understanding  - Provide teaching via preferred learning methods  Outcome: Progressing

## 2024-02-18 NOTE — ASSESSMENT & PLAN NOTE
Symptoms began the morning of 2/16 with weakness, hematuria, burning with urination, rigors  No prior history of UTI  UA with innumerable WBC, occasional bacteria  2/18: Currently only complaining of urinary frequency. He goes about every 2 hours.     Plan  Ceftriaxone 1 g daily  Cover enterococcus bacteremia with ampicillin  Follow blood and urine cultures  If no improvement would consider renal ultrasound  PT OT consulted, appreciate assistance

## 2024-02-18 NOTE — ASSESSMENT & PLAN NOTE
1/2 blood cultures positive for enterococcus faecalis and staph epidermidis  There is a possibility of contaminant as patient is not severely ill or septic appearing  WBC improved from 10.46 to 3.79       Plan:  Cover for enterococcus with ampicillin 2 g twice daily given CKD  Follow-up with repeat blood cultures  Follow-up with first set of cultures  Monitor daily CBC

## 2024-02-19 PROBLEM — R31.9 HEMATURIA: Status: RESOLVED | Noted: 2024-02-16 | Resolved: 2024-02-19

## 2024-02-19 LAB
ANION GAP SERPL CALCULATED.3IONS-SCNC: 8 MMOL/L
BASOPHILS # BLD AUTO: 0.03 THOUSANDS/ÂΜL (ref 0–0.1)
BASOPHILS NFR BLD AUTO: 1 % (ref 0–1)
BUN SERPL-MCNC: 24 MG/DL (ref 5–25)
CALCIUM SERPL-MCNC: 8.6 MG/DL (ref 8.4–10.2)
CHLORIDE SERPL-SCNC: 106 MMOL/L (ref 96–108)
CO2 SERPL-SCNC: 23 MMOL/L (ref 21–32)
CREAT SERPL-MCNC: 2.14 MG/DL (ref 0.6–1.3)
EOSINOPHIL # BLD AUTO: 0.38 THOUSAND/ÂΜL (ref 0–0.61)
EOSINOPHIL NFR BLD AUTO: 9 % (ref 0–6)
ERYTHROCYTE [DISTWIDTH] IN BLOOD BY AUTOMATED COUNT: 13.1 % (ref 11.6–15.1)
GFR SERPL CREATININE-BSD FRML MDRD: 28 ML/MIN/1.73SQ M
GLUCOSE SERPL-MCNC: 119 MG/DL (ref 65–140)
HCT VFR BLD AUTO: 33.5 % (ref 36.5–49.3)
HGB BLD-MCNC: 10.9 G/DL (ref 12–17)
IMM GRANULOCYTES # BLD AUTO: 0.01 THOUSAND/UL (ref 0–0.2)
IMM GRANULOCYTES NFR BLD AUTO: 0 % (ref 0–2)
LYMPHOCYTES # BLD AUTO: 1.4 THOUSANDS/ÂΜL (ref 0.6–4.47)
LYMPHOCYTES NFR BLD AUTO: 34 % (ref 14–44)
MCH RBC QN AUTO: 30.3 PG (ref 26.8–34.3)
MCHC RBC AUTO-ENTMCNC: 32.5 G/DL (ref 31.4–37.4)
MCV RBC AUTO: 93 FL (ref 82–98)
MONOCYTES # BLD AUTO: 0.62 THOUSAND/ÂΜL (ref 0.17–1.22)
MONOCYTES NFR BLD AUTO: 15 % (ref 4–12)
NEUTROPHILS # BLD AUTO: 1.68 THOUSANDS/ÂΜL (ref 1.85–7.62)
NEUTS SEG NFR BLD AUTO: 41 % (ref 43–75)
NRBC BLD AUTO-RTO: 0 /100 WBCS
PLATELET # BLD AUTO: 119 THOUSANDS/UL (ref 149–390)
PMV BLD AUTO: 9.7 FL (ref 8.9–12.7)
POTASSIUM SERPL-SCNC: 3.4 MMOL/L (ref 3.5–5.3)
RBC # BLD AUTO: 3.6 MILLION/UL (ref 3.88–5.62)
SODIUM SERPL-SCNC: 137 MMOL/L (ref 135–147)
WBC # BLD AUTO: 4.12 THOUSAND/UL (ref 4.31–10.16)

## 2024-02-19 PROCEDURE — 80048 BASIC METABOLIC PNL TOTAL CA: CPT

## 2024-02-19 PROCEDURE — 99223 1ST HOSP IP/OBS HIGH 75: CPT | Performed by: INTERNAL MEDICINE

## 2024-02-19 PROCEDURE — 85025 COMPLETE CBC W/AUTO DIFF WBC: CPT

## 2024-02-19 PROCEDURE — 99232 SBSQ HOSP IP/OBS MODERATE 35: CPT | Performed by: HOSPITALIST

## 2024-02-19 RX ORDER — AMOXICILLIN AND CLAVULANATE POTASSIUM 875; 125 MG/1; MG/1
1 TABLET, FILM COATED ORAL EVERY 12 HOURS SCHEDULED
Qty: 14 TABLET | Refills: 0 | Status: CANCELLED | OUTPATIENT
Start: 2024-02-19 | End: 2024-02-26

## 2024-02-19 RX ORDER — POTASSIUM CHLORIDE 20 MEQ/1
40 TABLET, EXTENDED RELEASE ORAL ONCE
Status: COMPLETED | OUTPATIENT
Start: 2024-02-19 | End: 2024-02-19

## 2024-02-19 RX ADMIN — AMPICILLIN SODIUM 2000 MG: 2 INJECTION, POWDER, FOR SOLUTION INTRAVENOUS at 05:16

## 2024-02-19 RX ADMIN — METOPROLOL SUCCINATE 25 MG: 25 TABLET, EXTENDED RELEASE ORAL at 21:46

## 2024-02-19 RX ADMIN — PANTOPRAZOLE SODIUM 20 MG: 20 TABLET, DELAYED RELEASE ORAL at 21:46

## 2024-02-19 RX ADMIN — Medication 250 MG: at 18:25

## 2024-02-19 RX ADMIN — VANCOMYCIN HYDROCHLORIDE 2000 MG: 5 INJECTION, POWDER, LYOPHILIZED, FOR SOLUTION INTRAVENOUS at 18:26

## 2024-02-19 RX ADMIN — METOPROLOL SUCCINATE 25 MG: 25 TABLET, EXTENDED RELEASE ORAL at 08:13

## 2024-02-19 RX ADMIN — TAMSULOSIN HYDROCHLORIDE 0.4 MG: 0.4 CAPSULE ORAL at 18:26

## 2024-02-19 RX ADMIN — HEPARIN SODIUM 5000 UNITS: 5000 INJECTION INTRAVENOUS; SUBCUTANEOUS at 14:57

## 2024-02-19 RX ADMIN — HEPARIN SODIUM 5000 UNITS: 5000 INJECTION INTRAVENOUS; SUBCUTANEOUS at 05:16

## 2024-02-19 RX ADMIN — Medication 250 MG: at 08:13

## 2024-02-19 RX ADMIN — POTASSIUM CHLORIDE 40 MEQ: 1500 TABLET, EXTENDED RELEASE ORAL at 09:33

## 2024-02-19 NOTE — DISCHARGE SUMMARY
[facility]  Discharge- Mathew Sloan Jr. 1945, 78 y.o. male MRN: [id number]  Unit/Bed#: [facility] 401-01 Encounter: [id number]  Primary Care Provider: Nick Haskins DO   Date and time admitted to hospital: 2/16/2024  7:50 AM    * Complicated UTI (urinary tract infection)  Assessment & Plan  Symptoms began the morning of 2/16 with weakness, hematuria, burning with urination, rigors  No prior history of UTI  UA with innumerable WBC, occasional bacteria  2/18: Currently only complaining of urinary frequency. He goes about every 2 hours.   Ultrasound kidney and bladder unremarkable    Plan  IR to place tunneled IJ PICC today  Will need 14 days of antibiotics through infusions  Continue daptomycin on day 2      Bacteremia due to Enterococcus  Assessment & Plan  1/2 blood cultures positive for enterococcus faecalis and staph epidermidis  There is a possibility of contaminant as patient is not severely ill or septic appearing  WBC improved from 10.46 to 4.82      Plan:  See plan for complicated UTI      Benign prostatic hyperplasia with nocturia  Assessment & Plan  Patient describing symptoms of BPH, having to get up every few hours at night to urinate  Check PVR  Consider initiation of tamsulosin if PVR elevated  Would recommend urology referral at discharge    Stage 3b chronic kidney disease (HCC)  Assessment & Plan  Lab Results   Component Value Date    EGFR 30 02/21/2024    EGFR 30 02/20/2024    EGFR 28 02/19/2024    CREATININE 2.01 (H) 02/21/2024    CREATININE 2.03 (H) 02/20/2024    CREATININE 2.14 (H) 02/19/2024     Baseline appears to be around 2.  Creatinine 2.28 on admission, not quite an ROSELYN.  See nephrology    Plan  Continue to monitor  Recommend nephrology referral on discharge    Bandemia  Assessment & Plan  Present on admission with 9% bands, no other SIRS criteria.  Likely secondary to UTI    Essential hypertension  Assessment & Plan  Continue home metoprolol succinate 25 mg  twice daily      Medical Problems       Resolved Problems  Date Reviewed: 2/17/2024            Resolved    Hematuria 2/19/2024     Resolved by  Dalton Cruz MD        Discharging Resident: Dalton Cruz MD  Discharging Attending: Pilar Browning MD  PCP: Nick Haskins DO  Admission Date:   Admission Orders (From admission, onward)       Ordered        02/16/24 1151  INPATIENT ADMISSION  Once                          Discharge Date: 02/21/24    Consultations During Hospital Stay:  ID, IR    Procedures Performed:   None    Significant Findings / Test Results:   1/2 BC positive for Enterococcus faecalis    Incidental Findings:   None     Test Results Pending at Discharge (will require follow up):  None     Outpatient Tests Requested:  None    Complications:  None    Reason for Admission: Weakness and fatigue    Hospital Course:   Mathew Sloan Jr. is a 78 y.o. male patient who originally presented to the hospital on 2/16/2024 due to diagnosis of complicated UTI.  He was started on ceftriaxone and ampicillin due to positive Enterococcus in 1 out of 2 blood cultures.  While was unclear if this was a contaminant or not ampicillin was added on to cover for this organism.  Repeat blood culture showed no growth at 24 hours. Over the next few days he started feeling much better and stated his weakness and fatigue had improved drastically.  On the day of discharge he felt well and had no complaints of dysuria and expressed interest in returning home. On the day of discharge patient saw IR to get tunneled IJ catheter placed for prolonged 14 day antibiotic therapy with daptomycin.      Please see above list of diagnoses and related plan for additional information.     Condition at Discharge: stable    Discharge Day Visit / Exam:   Subjective: No acute events overnight.  Patient states he would like to go home.  He no longer complains of dysuria or hematuria.  He states his frequency is baseline for him.  Vitals: Blood  "Pressure: 122/77 (02/21/24 1406)  Pulse: 77 (02/21/24 1406)  Temperature: 97.5 °F (36.4 °C) (02/21/24 0713)  Temp Source: Oral (02/20/24 1500)  Respirations: 16 (02/21/24 1348)  Height: 5' 9\" (175.3 cm) (02/18/24 0919)  Weight - Scale: 85.3 kg (188 lb) (02/18/24 0919)  SpO2: 95 % (02/21/24 1406)  Exam:   Physical Exam  Constitutional:       General: He is not in acute distress.     Appearance: He is normal weight. He is not ill-appearing or toxic-appearing.   HENT:      Head: Normocephalic and atraumatic.      Right Ear: External ear normal.      Left Ear: External ear normal.      Nose: Nose normal.      Mouth/Throat:      Mouth: Mucous membranes are moist.      Pharynx: Oropharynx is clear.   Eyes:      Extraocular Movements: Extraocular movements intact.      Conjunctiva/sclera: Conjunctivae normal.   Cardiovascular:      Rate and Rhythm: Normal rate and regular rhythm.      Pulses: Normal pulses.      Heart sounds: Normal heart sounds. No murmur heard.  Pulmonary:      Effort: Pulmonary effort is normal. No respiratory distress.      Breath sounds: Normal breath sounds. No wheezing, rhonchi or rales.   Abdominal:      General: Abdomen is flat. Bowel sounds are normal. There is no distension.      Palpations: Abdomen is soft.      Tenderness: There is no abdominal tenderness. There is no right CVA tenderness, left CVA tenderness, guarding or rebound.   Musculoskeletal:         General: No swelling or tenderness.      Right lower leg: No edema.      Left lower leg: No edema.   Skin:     General: Skin is warm and dry.      Capillary Refill: Capillary refill takes less than 2 seconds.   Neurological:      Mental Status: He is alert and oriented to person, place, and time. Mental status is at baseline.   Psychiatric:         Mood and Affect: Mood normal.         Behavior: Behavior normal. Behavior is cooperative.         Discussion with Family: Attempted to update  (wife) via phone. Unable to " contact.    Discharge instructions/Information to patient and family:   See after visit summary for information provided to patient and family.      Provisions for Follow-Up Care:  See after visit summary for information related to follow-up care and any pertinent home health orders.      Mobility at time of Discharge:   Basic Mobility Inpatient Raw Score: 20  JH-HLM Goal: 6: Walk 10 steps or more  JH-HLM Achieved: 6: Walk 10 steps or more  HLM Goal NOT achieved. Continue to encourage mobility in post discharge setting.     Disposition:   Home    Planned Readmission: No    Discharge Medications:  See after visit summary for reconciled discharge medications provided to patient and/or family.      **Please Note: This note may have been constructed using a voice recognition system**

## 2024-02-19 NOTE — CASE MANAGEMENT
Case Management Assessment & Discharge Planning Note    Patient name Mathew Sloan Jr.  Location W /[facility] 401-01 MRN [id number]  : 1945 Date 2024       Current Admission Date: 2024  Current Admission Diagnosis:Complicated UTI (urinary tract infection)   Patient Active Problem List    Diagnosis Date Noted    Enterococcal bacteremia 2024    Complicated UTI (urinary tract infection) 2024    Bandemia 2024    Stage 3b chronic kidney disease (HCC) 2024    Benign prostatic hyperplasia with nocturia 2024    Essential hypertension 2021    ROSELYN (acute kidney injury) (HCC) 2021    Elevated glucose level 2021    Palpitations 2021    YESENIA (obstructive sleep apnea) 2021    Closed nondisplaced fracture of greater tuberosity of right humerus 2019    Closed fracture of rim of glenoid fossa of right scapula 2019      LOS (days): 3  Geometric Mean LOS (GMLOS) (days): 2.9  Days to GMLOS:-0.3     OBJECTIVE:    Risk of Unplanned Readmission Score: 9.85         Current admission status: Inpatient       Preferred Pharmacy:   [facility] [address] [place] [id number]  Phone: [phone] Fax: [phone]    Primary Care Provider: Nick Haskins DO    Primary Insurance: MEDICARE  Secondary Insurance: COMMERCIAL MISCELLANEOUS    ASSESSMENT:  Active Health Care Proxies    There are no active Health Care Proxies on file.    Readmission Root Cause  30 Day Readmission: No    Patient Information  Admitted from:: Home  Mental Status: Alert  During Assessment patient was accompanied by: Not accompanied during assessment  Assessment information provided by:: Patient  Primary Caregiver: Self  Support Systems: Spouse/significant other, Daughter  County of Residence: [place]  What city do you live in?: [place]  Home entry access options. Select all that apply.: Stairs  Number of steps  to enter home.: 5  Type of Current Residence: 2 story home  Upon entering residence, is there a bedroom on the main floor (no further steps)?: Yes  Upon entering residence, is there a bathroom on the main floor (no further steps)?: Yes  Living Arrangements: Lives w/ Spouse/significant other    Activities of Daily Living Prior to Admission  Functional Status: Independent  Completes ADLs independently?: Yes  Ambulates independently?: Yes  Does patient use assisted devices?: No  Does patient currently own DME?: Yes  What DME does the patient currently own?: Walker  Does patient have a history of Outpatient Therapy (PT/OT)?: Yes  Does the patient have a history of Short-Term Rehab?: No  Does patient have a history of HHC?: No  Does patient currently have HHC?: No    Patient Information Continued  Income Source: Pension/[employer]  Does patient have prescription coverage?: Yes  Does patient receive dialysis treatments?: No  Does patient have a history of substance abuse?: No    Means of Transportation  Means of Transport to Appts:: Drives Self    Social Determinants of Health (SDOH)      Flowsheet Row Most Recent Value   Housing Stability    In the last 12 months, was there a time when you were not able to pay the mortgage or rent on time? N   In the last 12 months, how many places have you lived? 1   In the last 12 months, was there a time when you did not have a steady place to sleep or slept in a shelter (including now)? N   Transportation Needs    In the past 12 months, has lack of transportation kept you from medical appointments or from getting medications? no   In the past 12 months, has lack of transportation kept you from meetings, work, or from getting things needed for daily living? No   Food Insecurity    Within the past 12 months, you worried that your food would run out before you got the money to buy more. Never true   Within the past 12 months, the food you bought just didn't last and you didn't have  money to get more. Never true   Utilities    In the past 12 months has the electric, gas, oil, or water company threatened to shut off services in your home? No     DISCHARGE DETAILS:    Discharge planning discussed with:: patient at bedside  Freedom of Choice: Yes  Comments - Freedom of Choice: OPPT    Would you like to participate in our [facility] service program?  : No - Declined     Additional Comments: CM met with patient at bedside to complete assessment/ discuss dcp. Patient lives with his wife in a 2 story home bed and bath on first floor- 5STE. Patient is independent at baseline with ADLs and ambulation- owns a walker. Patient has a history of OP therapy. Patient aware of rec for OP therapy upon d/c- location guide provided. Patient aware CM will follow up as able to assist with dcp pending SLIM and IDs plan.

## 2024-02-19 NOTE — PLAN OF CARE
Problem: PAIN - ADULT  Goal: Verbalizes/displays adequate comfort level or baseline comfort level  Description: Interventions:  - Encourage patient to monitor pain and request assistance  - Assess pain using appropriate pain scale  - Administer analgesics based on type and severity of pain and evaluate response  - Implement non-pharmacological measures as appropriate and evaluate response  - Consider cultural and social influences on pain and pain management  - Notify physician/advanced practitioner if interventions unsuccessful or patient reports new pain  2/19/2024 1030 by Jina Melton  Outcome: Progressing  2/19/2024 1024 by Jina Melton  Outcome: Progressing     Problem: INFECTION - ADULT  Goal: Absence or prevention of progression during hospitalization  Description: INTERVENTIONS:  - Assess and monitor for signs and symptoms of infection  - Monitor lab/diagnostic results  - Monitor all insertion sites, i.e. indwelling lines, tubes, and drains  - Monitor endotracheal if appropriate and nasal secretions for changes in amount and color  - [place] appropriate cooling/warming therapies per order  - Administer medications as ordered  - Instruct and encourage patient and family to use good hand hygiene technique  - Identify and instruct in appropriate isolation precautions for identified infection/condition  2/19/2024 1030 by Jina Melton  Outcome: Progressing  2/19/2024 1024 by Jina Melton  Outcome: Progressing  Goal: Absence of fever/infection during neutropenic period  Description: INTERVENTIONS:  - Monitor WBC    2/19/2024 1030 by Jina Melton  Outcome: Progressing  2/19/2024 1024 by Jina Melton  Outcome: Progressing     Problem: SAFETY ADULT  Goal: Patient will remain free of falls  Description: INTERVENTIONS:  - Educate patient/family on patient safety including physical limitations  - Instruct patient to call for assistance with activity   - Consult OT/PT to assist with strengthening/mobility   -  Keep Call bell within reach  - Keep bed low and locked with side rails adjusted as appropriate  - Keep care items and personal belongings within reach  - Initiate and maintain comfort rounds  - Make Fall Risk Sign visible to staff  - Offer Toileting every  Hours, in advance of need  - Initiate/Maintain alarm  - Obtain necessary fall risk management equipment:  - Apply yellow socks and bracelet for high fall risk patients  - Consider moving patient to room near nurses station  2/19/2024 1030 by Jina Melton  Outcome: Progressing  2/19/2024 1024 by Jina Melton  Outcome: Progressing  Goal: Maintain or return to baseline ADL function  Description: INTERVENTIONS:  -  Assess patient's ability to carry out ADLs; assess patient's baseline for ADL function and identify physical deficits which impact ability to perform ADLs (bathing, care of mouth/teeth, toileting, grooming, dressing, etc.)  - Assess/evaluate cause of self-care deficits   - Assess range of motion  - Assess patient's mobility; develop plan if impaired  - Assess patient's need for assistive devices and provide as appropriate  - Encourage maximum independence but intervene and supervise when necessary  - Involve family in performance of ADLs  - Assess for home care needs following discharge   - Consider OT consult to assist with ADL evaluation and planning for discharge  - Provide patient education as appropriate  2/19/2024 1030 by Jina Melton  Outcome: Progressing  2/19/2024 1024 by Jina Melton  Outcome: Progressing  Goal: Maintains/Returns to pre admission functional level  Description: INTERVENTIONS:  - Perform AM-PAC 6 Click Basic Mobility/ Daily Activity assessment daily.  - Set and communicate daily mobility goal to care team and patient/family/caregiver.   - Collaborate with rehabilitation services on mobility goals if consulted  - Perform Range of Motion times a day.  - Reposition patient every  hours.  - Dangle patient  times a day  - Stand  patient  times a day  - Ambulate patient  times a day  - Out of bed to chair  times a day   - Out of bed for meals  times a day  - Out of bed for toileting  - Record patient progress and toleration of activity level   2/19/2024 1030 by Jina Melton  Outcome: Progressing  2/19/2024 1024 by Jina Melton  Outcome: Progressing     Problem: DISCHARGE PLANNING  Goal: Discharge to home or other facility with appropriate resources  Description: INTERVENTIONS:  - Identify barriers to discharge w/patient and caregiver  - Arrange for needed discharge resources and transportation as appropriate  - Identify discharge learning needs (meds, wound care, etc.)  - Arrange for interpretive services to assist at discharge as needed  - Refer to Case Management Department for coordinating discharge planning if the patient needs post-hospital services based on physician/advanced practitioner order or complex needs related to functional status, cognitive ability, or social support system  2/19/2024 1030 by Jina Melton  Outcome: Progressing  2/19/2024 1024 by Jina Melton  Outcome: Progressing     Problem: Knowledge Deficit  Goal: Patient/family/caregiver demonstrates understanding of disease process, treatment plan, medications, and discharge instructions  Description: Complete learning assessment and assess knowledge base.  Interventions:  - Provide teaching at level of understanding  - Provide teaching via preferred learning methods  2/19/2024 1030 by Jina Melton  Outcome: Progressing  2/19/2024 1024 by Jina Melton  Outcome: Progressing

## 2024-02-19 NOTE — ASSESSMENT & PLAN NOTE
Lab Results   Component Value Date    EGFR 28 02/19/2024    EGFR 28 02/18/2024    EGFR 26 02/17/2024    CREATININE 2.14 (H) 02/19/2024    CREATININE 2.14 (H) 02/18/2024    CREATININE 2.24 (H) 02/17/2024     Baseline appears to be around 2.  Creatinine 2.28 on admission, not quite an ROSELYN.  See nephrology    Plan  Continue to monitor  Recommend nephrology referral on discharge

## 2024-02-19 NOTE — PLAN OF CARE
Problem: PAIN - ADULT  Goal: Verbalizes/displays adequate comfort level or baseline comfort level  Description: Interventions:  - Encourage patient to monitor pain and request assistance  - Assess pain using appropriate pain scale  - Administer analgesics based on type and severity of pain and evaluate response  - Implement non-pharmacological measures as appropriate and evaluate response  - Consider cultural and social influences on pain and pain management  - Notify physician/advanced practitioner if interventions unsuccessful or patient reports new pain  Outcome: Progressing     Problem: INFECTION - ADULT  Goal: Absence or prevention of progression during hospitalization  Description: INTERVENTIONS:  - Assess and monitor for signs and symptoms of infection  - Monitor lab/diagnostic results  - Monitor all insertion sites, i.e. indwelling lines, tubes, and drains  - Monitor endotracheal if appropriate and nasal secretions for changes in amount and color  - [place] appropriate cooling/warming therapies per order  - Administer medications as ordered  - Instruct and encourage patient and family to use good hand hygiene technique  - Identify and instruct in appropriate isolation precautions for identified infection/condition  Outcome: Progressing  Goal: Absence of fever/infection during neutropenic period  Description: INTERVENTIONS:  - Monitor WBC    Outcome: Progressing     Problem: SAFETY ADULT  Goal: Patient will remain free of falls  Description: INTERVENTIONS:  - Educate patient/family on patient safety including physical limitations  - Instruct patient to call for assistance with activity   - Consult OT/PT to assist with strengthening/mobility   - Keep Call bell within reach  - Keep bed low and locked with side rails adjusted as appropriate  - Keep care items and personal belongings within reach  - Initiate and maintain comfort rounds  - Make Fall Risk Sign visible to staff  - Offer Toileting every  Hours,  in advance of need  - Initiate/Maintain alarm  - Obtain necessary fall risk management equipment:  - Apply yellow socks and bracelet for high fall risk patients  - Consider moving patient to room near nurses station  Outcome: Progressing  Goal: Maintain or return to baseline ADL function  Description: INTERVENTIONS:  -  Assess patient's ability to carry out ADLs; assess patient's baseline for ADL function and identify physical deficits which impact ability to perform ADLs (bathing, care of mouth/teeth, toileting, grooming, dressing, etc.)  - Assess/evaluate cause of self-care deficits   - Assess range of motion  - Assess patient's mobility; develop plan if impaired  - Assess patient's need for assistive devices and provide as appropriate  - Encourage maximum independence but intervene and supervise when necessary  - Involve family in performance of ADLs  - Assess for home care needs following discharge   - Consider OT consult to assist with ADL evaluation and planning for discharge  - Provide patient education as appropriate  Outcome: Progressing  Goal: Maintains/Returns to pre admission functional level  Description: INTERVENTIONS:  - Perform AM-PAC 6 Click Basic Mobility/ Daily Activity assessment daily.  - Set and communicate daily mobility goal to care team and patient/family/caregiver.   - Collaborate with rehabilitation services on mobility goals if consulted  - Perform Range of Motion times a day.  - Reposition patient every  hours.  - Dangle patient  times a day  - Stand patient  times a day  - Ambulate patient  times a day  - Out of bed to chair  times a day   - Out of bed for meals  times a day  - Out of bed for toileting  - Record patient progress and toleration of activity level   Outcome: Progressing     Problem: DISCHARGE PLANNING  Goal: Discharge to home or other facility with appropriate resources  Description: INTERVENTIONS:  - Identify barriers to discharge w/patient and caregiver  - Arrange for  needed discharge resources and transportation as appropriate  - Identify discharge learning needs (meds, wound care, etc.)  - Arrange for interpretive services to assist at discharge as needed  - Refer to Case Management Department for coordinating discharge planning if the patient needs post-hospital services based on physician/advanced practitioner order or complex needs related to functional status, cognitive ability, or social support system  Outcome: Progressing     Problem: Knowledge Deficit  Goal: Patient/family/caregiver demonstrates understanding of disease process, treatment plan, medications, and discharge instructions  Description: Complete learning assessment and assess knowledge base.  Interventions:  - Provide teaching at level of understanding  - Provide teaching via preferred learning methods  Outcome: Progressing

## 2024-02-19 NOTE — PROGRESS NOTES
[facility]  Progress Note  Name: Mathew Lozano I  MRN: [id number]  Unit/Bed#: [facility] 401-01 I Date of Admission: 2/16/2024   Date of Service: 2/19/2024 I Hospital Day: 3    Assessment/Plan   * Complicated UTI (urinary tract infection)  Assessment & Plan  Symptoms began the morning of 2/16 with weakness, hematuria, burning with urination, rigors  No prior history of UTI  UA with innumerable WBC, occasional bacteria  2/18: Currently only complaining of urinary frequency. He goes about every 2 hours.   Ultrasound kidney and bladder unremarkable    Plan  Continue ampicillin 2 g BID for enterococcus bacteremia   Consult ID    Enterococcal bacteremia  Assessment & Plan  1/2 blood cultures positive for enterococcus faecalis and staph epidermidis  There is a possibility of contaminant as patient is not severely ill or septic appearing  WBC improved from 10.46 to 4.12      Plan:  Cover for enterococcus with ampicillin 2 g twice daily given CKD  Monitor daily CBC      Benign prostatic hyperplasia with nocturia  Assessment & Plan  Patient describing symptoms of BPH, having to get up every few hours at night to urinate  Check PVR  Consider initiation of tamsulosin if PVR elevated  Would recommend urology referral at discharge    Stage 3b chronic kidney disease (HCC)  Assessment & Plan  Lab Results   Component Value Date    EGFR 28 02/19/2024    EGFR 28 02/18/2024    EGFR 26 02/17/2024    CREATININE 2.14 (H) 02/19/2024    CREATININE 2.14 (H) 02/18/2024    CREATININE 2.24 (H) 02/17/2024     Baseline appears to be around 2.  Creatinine 2.28 on admission, not quite an ROSELYN.  See nephrology    Plan  Continue to monitor  Recommend nephrology referral on discharge    Bandemia  Assessment & Plan  Present on admission with 9% bands, no other SIRS criteria.  Likely secondary to UTI    Essential hypertension  Assessment & Plan  Continue home metoprolol succinate 25 mg twice daily    Hematuria-resolved as of  2024  Assessment & Plan  Presents today without any prior episodes.  No history of smoking.  Most likely secondary to UTI.           VTE Pharmacologic Prophylaxis: VTE Score: 5 High Risk (Score >/= 5) - Pharmacological DVT Prophylaxis Ordered: heparin. Sequential Compression Devices Ordered.    Mobility:   Basic Mobility Inpatient Raw Score: 24  JH-HLM Goal: 8: Walk 250 feet or more  JH-HLM Achieved: 6: Walk 10 steps or more  HLM Goal NOT achieved. Continue with multidisciplinary rounding and encourage appropriate mobility to improve upon HLM goals.    Patient Centered Rounds: I performed bedside rounds with nursing staff today.  Discussions with Specialists or Other Care Team Provider: ID    Education and Discussions with Family / Patient: Attempted to update  (wife) via phone. Unable to contact.    Current Length of Stay: 3 day(s)  Current Patient Status: Inpatient   Discharge Plan: Anticipate discharge in 24-48 hrs to home.    Code Status: Level 3 - DNAR and DNI    Subjective:   No acute events ON. Patient is feeling well.  Complaints of frequency but no longer has hematuria.    Objective:     Vitals:   Temp (24hrs), Av.8 °F (36.6 °C), Min:97.6 °F (36.4 °C), Max:98 °F (36.7 °C)    Temp:  [97.6 °F (36.4 °C)-98 °F (36.7 °C)] 97.8 °F (36.6 °C)  HR:  [75-79] 75  Resp:  [18] 18  BP: (120-124)/(72-75) 121/72  SpO2:  [92 %-94 %] 92 %  Body mass index is 27.76 kg/m².     Input and Output Summary (last 24 hours):     Intake/Output Summary (Last 24 hours) at 2024 1453  Last data filed at 2024 1826  Gross per 24 hour   Intake 240 ml   Output --   Net 240 ml       Physical Exam:   Physical Exam  Constitutional:       General: He is not in acute distress.     Appearance: He is normal weight. He is not ill-appearing or toxic-appearing.   HENT:      Head: Normocephalic and atraumatic.      Right Ear: External ear normal.      Left Ear: External ear normal.      Nose: Nose normal.       Mouth/Throat:      Mouth: Mucous membranes are moist.      Pharynx: Oropharynx is clear.   Eyes:      Extraocular Movements: Extraocular movements intact.      Conjunctiva/sclera: Conjunctivae normal.   Cardiovascular:      Rate and Rhythm: Normal rate and regular rhythm.      Pulses: Normal pulses.      Heart sounds: Normal heart sounds. No murmur heard.  Pulmonary:      Effort: Pulmonary effort is normal. No respiratory distress.      Breath sounds: Normal breath sounds. No wheezing, rhonchi or rales.   Abdominal:      General: Abdomen is flat. Bowel sounds are normal. There is no distension.      Palpations: Abdomen is soft.      Tenderness: There is no abdominal tenderness. There is no right CVA tenderness, left CVA tenderness, guarding or rebound.   Musculoskeletal:         General: No swelling or tenderness.      Right lower leg: No edema.      Left lower leg: No edema.   Skin:     General: Skin is warm and dry.      Capillary Refill: Capillary refill takes less than 2 seconds.   Neurological:      Mental Status: He is alert and oriented to person, place, and time. Mental status is at baseline.   Psychiatric:         Mood and Affect: Mood normal.         Behavior: Behavior normal. Behavior is cooperative.          Additional Data:     Labs:  Results from last 7 days   Lab Units 02/19/24 0426 02/17/24 0443 02/16/24  0814   WBC Thousand/uL 4.12*   < > 10.46*   HEMOGLOBIN g/dL 10.9*   < > 12.5   HEMATOCRIT % 33.5*   < > 38.2   PLATELETS Thousands/uL 119*   < > 140*   BANDS PCT %  --   --  9*   NEUTROS PCT % 41*   < >  --    LYMPHS PCT % 34   < >  --    LYMPHO PCT %  --   --  5*   MONOS PCT % 15*   < >  --    MONO PCT %  --   --  4   EOS PCT % 9*   < > 0    < > = values in this interval not displayed.     Results from last 7 days   Lab Units 02/19/24 0426 02/17/24 0443 02/16/24  0814   SODIUM mmol/L 137   < > 139   POTASSIUM mmol/L 3.4*   < > 3.4*   CHLORIDE mmol/L 106   < > 106   CO2 mmol/L 23   < > 22   BUN  mg/dL 24   < > 33*   CREATININE mg/dL 2.14*   < > 2.28*   ANION GAP mmol/L 8   < > 11   CALCIUM mg/dL 8.6   < > 9.0   ALBUMIN g/dL  --   --  4.2   TOTAL BILIRUBIN mg/dL  --   --  0.67   ALK PHOS U/L  --   --  55   ALT U/L  --   --  8   AST U/L  --   --  14   GLUCOSE RANDOM mg/dL 119   < > 136    < > = values in this interval not displayed.                       Lines/Drains:  Invasive Devices       Peripheral Intravenous Line  Duration             Peripheral IV 02/16/24 Proximal;Right;Ventral (anterior) Forearm 3 days    Peripheral IV 02/16/24 Left;Ventral (anterior) Forearm 2 days                          Imaging: Reviewed radiology reports from this admission including: ultrasound(s)    Recent Cultures (last 7 days):   Results from last 7 days   Lab Units 02/17/24  0858 02/16/24  1149 02/16/24  0949 02/16/24  0813   BLOOD CULTURE  No Growth at 24 hrs.  No Growth at 24 hrs. Enterococcus faecalis*  --  Staphylococcus epidermidis*   GRAM STAIN RESULT   --  Gram positive cocci in pairs and chains*  --  Gram positive cocci in clusters*   URINE CULTURE   --   --  50,000-59,000 cfu/ml Enterococcus faecalis*  30,000-39,000 cfu/ml Staphylococcus coagulase negative*  --        Last 24 Hours Medication List:   Current Facility-Administered Medications   Medication Dose Route Frequency Provider Last Rate    acetaminophen  650 mg Oral Q6H PRN Susan Schroeder DO      ampicillin  2,000 mg Intravenous Q12H Ervin Mujica, DO 2,000 mg (02/19/24 0516)    guaiFENesin  1,200 mg Oral Q12H PRN Ab Gibson DO      heparin (porcine)  5,000 Units Subcutaneous Q8H [facility] Ab Gibson DO      loperamide  2 mg Oral 4x Daily PRN Burke Verde MD      metoprolol succinate  25 mg Oral BID Ab Gibson DO      pantoprazole  20 mg Oral QPM Ab Gibson DO      saccharomyces boulardii  250 mg Oral BID Burke Verde MD      tamsulosin  0.4 mg Oral Daily With Dinner Pilar Browning MD           Today, Patient Was Seen By: Dalton Cruz MD    **Please Note: This note may have been constructed using a voice recognition system.**

## 2024-02-19 NOTE — ASSESSMENT & PLAN NOTE
1/2 blood cultures positive for enterococcus faecalis and staph epidermidis  There is a possibility of contaminant as patient is not severely ill or septic appearing  WBC improved from 10.46 to 4.12      Plan:  Cover for enterococcus with ampicillin 2 g twice daily given CKD  Monitor daily CBC

## 2024-02-19 NOTE — ASSESSMENT & PLAN NOTE
Symptoms began the morning of 2/16 with weakness, hematuria, burning with urination, rigors  No prior history of UTI  UA with innumerable WBC, occasional bacteria  2/18: Currently only complaining of urinary frequency. He goes about every 2 hours.   Ultrasound kidney and bladder unremarkable    Plan  Continue ampicillin 2 g BID for enterococcus bacteremia   Consult ID

## 2024-02-19 NOTE — CONSULTS
Consultation - Infectious Disease   Mathew Sloan Jr. 78 y.o. male MRN: [id number]  Unit/Bed#: W -01 Encounter: [id number]      IMPRESSION & RECOMMENDATIONS:   1.  Gram-positive bacteremia.  With Enterococcus isolated in 1 out of 2 sets, and Staphylococcus epidermidis isolated in 2 sets.  Both of these organisms have been isolated in the urinary tract.  This suggest the possibility the patient was suffering from retention and now has a complicated UTI with complicating bacteremia.  Fortunately the patient remains hemodynamically stable and has improved.  Consideration for the possibility of an endovascular infection although this is very unlikely with the transthoracic echocardiogram negative and repeat blood cultures negative thus far.  -Discontinue ampicillin  -Begin vancomycin 1.25 g IV every 24 hours to cover both of these organisms  -Consult pharmacy for vancomycin dose management.  -Follow-up repeat blood cultures  -Recheck CBC with differential and BMP to make sure no developing toxicities  -Baseline CPK in case need to use daptomycin as below  -If blood cultures remain negative by tomorrow, likely transition to daptomycin to allow for once daily treatment at the infusion center to complete 2 weeks total treatment    2.  Complicated urinary tract infection.  In a patient who presented with urinary symptoms, rigors, and abnormal urinalysis and a positive urine culture.  Symptoms much improved with antibiotics.  -Antibiotics as above  -Monitor symptomatology  -Recommend urology evaluation  -Monitor postvoid residual    3.  Chronic kidney disease.  Stage IIIb.  Renal function now relatively stable.  -Dose adjusted antibiotics  -Recheck BMP to make sure no further dose adjustment needs to be done  -Volume management    Discussed with the primary service the plan to change the antibiotics to vancomycin for now.  They agree with the plan.    Extensive review of the medical records in epic including review  of the notes, radiographs, and laboratory results     HISTORY OF PRESENT ILLNESS:  Reason for Consult: Bacteremia  HPI: Mathew Sloan Jr. is a 78 y.o. year old male with hypertension, chronic kidney disease, admitted to [facility] with dysuria, hematuria, and global weakness and the development of shaking chills who we are asked to assist with management of gram-positive bacteremia in the setting of a UTI.  The patient never had a UTI in the past.  He had been doing reasonably well but had started to feel weak on the day prior to admission.  He had struggled with nocturia over the years but without other symptomatology.  He woke around 2 to 3 AM the morning of admission when he developed rigors and described difficulty urinating with blood and pain with urination.  Therefore was brought to the hospital for further evaluation.  In the emergency department the patient was found to be afebrile but on laboratory evaluation had a bandemia and a urinalysis consistent with UTI.  He had blood cultures and urine cultures obtained and was started on ceftriaxone and admitted for further management.  One of his blood cultures and a urine culture initially came up positive for Enterococcus.  Subsequently the urine culture and blood culture in 2 sets has come out positive for Staphylococcus epidermidis.  The patient's symptomatology has significantly improved.  He denies any nausea vomiting or diarrhea, denies any cough or shortness of breath, denies any sore throat rhinorrhea or nasal congestion, denies any rash or skin lesions.    REVIEW OF SYSTEMS:  A complete review of systems is negative other than that noted in the HPI.    PAST MEDICAL HISTORY:  Past Medical History:   Diagnosis Date    Asthma     CKD (chronic kidney disease)     Hypertension     Paroxysmal A-fib (HCC)     Paroxysmal SVT (supraventricular tachycardia)      Past Surgical History:   Procedure Laterality Date    CYST REMOVAL       TONSILLECTOMY         FAMILY HISTORY:  Non-contributory    SOCIAL HISTORY:  Social History   Social History     Substance and Sexual Activity   Alcohol Use Yes    Comment: social     Social History     Substance and Sexual Activity   Drug Use Never     Social History     Tobacco Use   Smoking Status Never   Smokeless Tobacco Never       ALLERGIES:  Allergies   Allergen Reactions    Chlorpheniramine Other (See Comments)    Phenylephrine Other (See Comments)       MEDICATIONS:  All current active medications have been reviewed.  Antibiotics: Ampicillin    PHYSICAL EXAM:  Temp:  [97.6 °F (36.4 °C)-97.9 °F (36.6 °C)] 97.8 °F (36.6 °C)  HR:  [75-79] 75  Resp:  [18] 18  BP: (121-124)/(72-75) 121/72  SpO2:  [92 %-94 %] 92 %  Temp (24hrs), Av.8 °F (36.6 °C), Min:97.6 °F (36.4 °C), Max:97.9 °F (36.6 °C)  Current: Temperature: 97.8 °F (36.6 °C)    Intake/Output Summary (Last 24 hours) at 2024 1640  Last data filed at 2024 1826  Gross per 24 hour   Intake 240 ml   Output --   Net 240 ml       General Appearance:  Appearing well, nontoxic, and in no distress   Head:  Normocephalic, without obvious abnormality, atraumatic   Eyes:  Conjunctiva pink and sclera anicteric, both eyes   Nose: Nares normal, mucosa normal, no drainage   Throat: Oropharynx moist without lesions   Neck: Supple, symmetrical, no adenopathy, no tenderness/mass/nodules   Back:   Symmetric, no curvature, ROM normal, no CVA tenderness   Lungs:   Clear to auscultation bilaterally, respirations unlabored   Chest Wall:  No tenderness or deformity   Heart:  RRR; no murmur, rub or gallop   Abdomen:   Soft, non-tender, non-distended, positive bowel sounds    Extremities: No cyanosis, clubbing or edema   Skin: No rashes or lesions. No draining wounds noted.   Lymph nodes: Cervical, supraclavicular nodes normal   Neurologic: Alert and oriented times 3, extremity strength 5/5 and symmetric       LABS, IMAGING, & OTHER STUDIES:  Lab Results:  I have  personally reviewed pertinent labs.  Results from last 7 days   Lab Units 02/19/24  0426 02/18/24  0429 02/17/24  0443   WBC Thousand/uL 4.12* 3.79* 8.14   HEMOGLOBIN g/dL 10.9* 11.3* 11.0*   PLATELETS Thousands/uL 119* 112* 116*     Results from last 7 days   Lab Units 02/19/24  0426 02/18/24  0429 02/17/24  0443 02/16/24  0814   SODIUM mmol/L 137 138 136 139   POTASSIUM mmol/L 3.4* 3.5 3.5 3.4*   CHLORIDE mmol/L 106 106 104 106   CO2 mmol/L 23 22 24 22   BUN mg/dL 24 21 30* 33*   CREATININE mg/dL 2.14* 2.14* 2.24* 2.28*   EGFR ml/min/1.73sq m 28 28 26 26   CALCIUM mg/dL 8.6 8.8 8.8 9.0   AST U/L  --   --   --  14   ALT U/L  --   --   --  8   ALK PHOS U/L  --   --   --  55     Results from last 7 days   Lab Units 02/17/24  0858 02/16/24  1149 02/16/24  0949 02/16/24  0813   BLOOD CULTURE  No Growth at 48 hrs.  No Growth at 48 hrs. Enterococcus faecalis*  Staphylococcus epidermidis*  --  Staphylococcus epidermidis*   GRAM STAIN RESULT   --  Gram positive cocci in pairs and chains*  --  Gram positive cocci in clusters*   URINE CULTURE   --   --  50,000-59,000 cfu/ml Enterococcus faecalis*  30,000-39,000 cfu/ml Staphylococcus coagulase negative*  --                        Imaging Studies:     Renal ultrasound.  No hydronephrosis.  No focal thickening or mass lesions.    Images personally reviewed by me in PACS

## 2024-02-19 NOTE — DISCHARGE INSTR - AVS FIRST PAGE
Dear Mathew Sloan Jr.,     It was our pleasure to care for you here at [facility].  It is our hope that we were always able to exceed the expected standards for your care during your stay.  You were hospitalized due to weakness and hematuria.  You were cared for on the fourth floor by Dalton Cruz MD under the service of Pilar Browning MD with the [facility] who covers for your primary care physician (PCP), Nick Haskins DO, while you were hospitalized.  If you have any questions or concerns related to this hospitalization, you may contact us at .  For follow up as well as any medication refills, we recommend that you follow up with your primary care physician.  A registered nurse will reach out to you by phone within a few days after your discharge to answer any additional questions that you may have after going home.  However, at this time we provide for you here, the most important instructions / recommendations at discharge:     Notable Medication Adjustments -   Start receiving daptomycin infusion at [facility] infusion center. Continue daptomycin through 3/1/2024 to complete 2 weeks of treatment   Testing Required after Discharge -   None  Important follow up information -   None  Please follow up with your primary care physician after discharge  Other Instructions -   Should your symptoms return, or you develop new concerning symptoms, please call your doctor and/or go to your nearest Emergency Department.  Please review this entire after visit summary as additional general instructions including medication list, appointments, activity, diet, any pertinent wound care, and other additional recommendations from your care team that may be provided for you.      Sincerely,     Dalton Cruz MD

## 2024-02-19 NOTE — PLAN OF CARE
Problem: PAIN - ADULT  Goal: Verbalizes/displays adequate comfort level or baseline comfort level  Description: Interventions:  - Encourage patient to monitor pain and request assistance  - Assess pain using appropriate pain scale  - Administer analgesics based on type and severity of pain and evaluate response  - Implement non-pharmacological measures as appropriate and evaluate response  - Consider cultural and social influences on pain and pain management  - Notify physician/advanced practitioner if interventions unsuccessful or patient reports new pain  Outcome: Progressing     Problem: INFECTION - ADULT  Goal: Absence or prevention of progression during hospitalization  Description: INTERVENTIONS:  - Assess and monitor for signs and symptoms of infection  - Monitor lab/diagnostic results  - Monitor all insertion sites, i.e. indwelling lines, tubes, and drains  - Monitor endotracheal if appropriate and nasal secretions for changes in amount and color  - [place] appropriate cooling/warming therapies per order  - Administer medications as ordered  - Instruct and encourage patient and family to use good hand hygiene technique  - Identify and instruct in appropriate isolation precautions for identified infection/condition  Outcome: Progressing  Goal: Absence of fever/infection during neutropenic period  Description: INTERVENTIONS:  - Monitor WBC    Outcome: Progressing     Problem: SAFETY ADULT  Goal: Patient will remain free of falls  Description: INTERVENTIONS:  - Educate patient/family on patient safety including physical limitations  - Instruct patient to call for assistance with activity   - Consult OT/PT to assist with strengthening/mobility   - Keep Call bell within reach  - Keep bed low and locked with side rails adjusted as appropriate  - Keep care items and personal belongings within reach  - Initiate and maintain comfort rounds  - Make Fall Risk Sign visible to staff  - Offer Toileting every  Hours,  in advance of need  - Initiate/Maintain alarm  - Obtain necessary fall risk management equipment:  - Apply yellow socks and bracelet for high fall risk patients  - Consider moving patient to room near nurses station  Outcome: Progressing  Goal: Maintain or return to baseline ADL function  Description: INTERVENTIONS:  -  Assess patient's ability to carry out ADLs; assess patient's baseline for ADL function and identify physical deficits which impact ability to perform ADLs (bathing, care of mouth/teeth, toileting, grooming, dressing, etc.)  - Assess/evaluate cause of self-care deficits   - Assess range of motion  - Assess patient's mobility; develop plan if impaired  - Assess patient's need for assistive devices and provide as appropriate  - Encourage maximum independence but intervene and supervise when necessary  - Involve family in performance of ADLs  - Assess for home care needs following discharge   - Consider OT consult to assist with ADL evaluation and planning for discharge  - Provide patient education as appropriate  Outcome: Progressing  Goal: Maintains/Returns to pre admission functional level  Description: INTERVENTIONS:  - Perform AM-PAC 6 Click Basic Mobility/ Daily Activity assessment daily.  - Set and communicate daily mobility goal to care team and patient/family/caregiver.   - Collaborate with rehabilitation services on mobility goals if consulted  - Perform Range of Motion times a day.  - Reposition patient every  hours.  - Dangle patient  times a day  - Stand patient  times a day  - Ambulate patient  times a day  - Out of bed to chair  times a day   - Out of bed for meals  times a day  - Out of bed for toileting  - Record patient progress and toleration of activity level   Outcome: Progressing     Problem: DISCHARGE PLANNING  Goal: Discharge to home or other facility with appropriate resources  Description: INTERVENTIONS:  - Identify barriers to discharge w/patient and caregiver  - Arrange for  needed discharge resources and transportation as appropriate  - Identify discharge learning needs (meds, wound care, etc.)  - Arrange for interpretive services to assist at discharge as needed  - Refer to Case Management Department for coordinating discharge planning if the patient needs post-hospital services based on physician/advanced practitioner order or complex needs related to functional status, cognitive ability, or social support system  Outcome: Progressing     Problem: Knowledge Deficit  Goal: Patient/family/caregiver demonstrates understanding of disease process, treatment plan, medications, and discharge instructions  Description: Complete learning assessment and assess knowledge base.  Interventions:  - Provide teaching at level of understanding  - Provide teaching via preferred learning methods  Outcome: Progressing

## 2024-02-20 PROBLEM — R78.81 BACTEREMIA DUE TO STAPHYLOCOCCUS: Status: ACTIVE | Noted: 2024-02-20

## 2024-02-20 PROBLEM — B95.8 BACTEREMIA DUE TO STAPHYLOCOCCUS: Status: ACTIVE | Noted: 2024-02-20

## 2024-02-20 LAB
ANION GAP SERPL CALCULATED.3IONS-SCNC: 7 MMOL/L
BASOPHILS # BLD AUTO: 0.04 THOUSANDS/ÂΜL (ref 0–0.1)
BASOPHILS NFR BLD AUTO: 1 % (ref 0–1)
BUN SERPL-MCNC: 24 MG/DL (ref 5–25)
CALCIUM SERPL-MCNC: 9.2 MG/DL (ref 8.4–10.2)
CHLORIDE SERPL-SCNC: 108 MMOL/L (ref 96–108)
CK SERPL-CCNC: 41 U/L (ref 39–308)
CO2 SERPL-SCNC: 22 MMOL/L (ref 21–32)
CREAT SERPL-MCNC: 2.03 MG/DL (ref 0.6–1.3)
EOSINOPHIL # BLD AUTO: 0.48 THOUSAND/ÂΜL (ref 0–0.61)
EOSINOPHIL NFR BLD AUTO: 10 % (ref 0–6)
ERYTHROCYTE [DISTWIDTH] IN BLOOD BY AUTOMATED COUNT: 13.1 % (ref 11.6–15.1)
GFR SERPL CREATININE-BSD FRML MDRD: 30 ML/MIN/1.73SQ M
GLUCOSE SERPL-MCNC: 117 MG/DL (ref 65–140)
HCT VFR BLD AUTO: 37 % (ref 36.5–49.3)
HGB BLD-MCNC: 11.9 G/DL (ref 12–17)
IMM GRANULOCYTES # BLD AUTO: 0.01 THOUSAND/UL (ref 0–0.2)
IMM GRANULOCYTES NFR BLD AUTO: 0 % (ref 0–2)
LYMPHOCYTES # BLD AUTO: 1.84 THOUSANDS/ÂΜL (ref 0.6–4.47)
LYMPHOCYTES NFR BLD AUTO: 37 % (ref 14–44)
MCH RBC QN AUTO: 30.1 PG (ref 26.8–34.3)
MCHC RBC AUTO-ENTMCNC: 32.2 G/DL (ref 31.4–37.4)
MCV RBC AUTO: 93 FL (ref 82–98)
MONOCYTES # BLD AUTO: 0.47 THOUSAND/ÂΜL (ref 0.17–1.22)
MONOCYTES NFR BLD AUTO: 9 % (ref 4–12)
NEUTROPHILS # BLD AUTO: 2.17 THOUSANDS/ÂΜL (ref 1.85–7.62)
NEUTS SEG NFR BLD AUTO: 43 % (ref 43–75)
NRBC BLD AUTO-RTO: 0 /100 WBCS
PLATELET # BLD AUTO: 144 THOUSANDS/UL (ref 149–390)
PMV BLD AUTO: 9.3 FL (ref 8.9–12.7)
POTASSIUM SERPL-SCNC: 4 MMOL/L (ref 3.5–5.3)
RBC # BLD AUTO: 3.96 MILLION/UL (ref 3.88–5.62)
SODIUM SERPL-SCNC: 137 MMOL/L (ref 135–147)
WBC # BLD AUTO: 5.01 THOUSAND/UL (ref 4.31–10.16)

## 2024-02-20 PROCEDURE — 80048 BASIC METABOLIC PNL TOTAL CA: CPT

## 2024-02-20 PROCEDURE — 85025 COMPLETE CBC W/AUTO DIFF WBC: CPT

## 2024-02-20 PROCEDURE — 99232 SBSQ HOSP IP/OBS MODERATE 35: CPT | Performed by: INTERNAL MEDICINE

## 2024-02-20 PROCEDURE — 99232 SBSQ HOSP IP/OBS MODERATE 35: CPT | Performed by: HOSPITALIST

## 2024-02-20 PROCEDURE — 82550 ASSAY OF CK (CPK): CPT | Performed by: INTERNAL MEDICINE

## 2024-02-20 RX ORDER — SODIUM CHLORIDE 9 MG/ML
20 INJECTION, SOLUTION INTRAVENOUS ONCE
Status: CANCELLED | OUTPATIENT
Start: 2024-02-22

## 2024-02-20 RX ADMIN — TAMSULOSIN HYDROCHLORIDE 0.4 MG: 0.4 CAPSULE ORAL at 16:57

## 2024-02-20 RX ADMIN — METOPROLOL SUCCINATE 25 MG: 25 TABLET, EXTENDED RELEASE ORAL at 08:31

## 2024-02-20 RX ADMIN — METOPROLOL SUCCINATE 25 MG: 25 TABLET, EXTENDED RELEASE ORAL at 20:56

## 2024-02-20 RX ADMIN — HEPARIN SODIUM 5000 UNITS: 5000 INJECTION INTRAVENOUS; SUBCUTANEOUS at 20:56

## 2024-02-20 RX ADMIN — HEPARIN SODIUM 5000 UNITS: 5000 INJECTION INTRAVENOUS; SUBCUTANEOUS at 04:58

## 2024-02-20 RX ADMIN — HEPARIN SODIUM 5000 UNITS: 5000 INJECTION INTRAVENOUS; SUBCUTANEOUS at 14:12

## 2024-02-20 RX ADMIN — Medication 250 MG: at 08:31

## 2024-02-20 RX ADMIN — DAPTOMYCIN 500 MG: 500 INJECTION, POWDER, LYOPHILIZED, FOR SOLUTION INTRAVENOUS at 14:12

## 2024-02-20 RX ADMIN — Medication 250 MG: at 17:00

## 2024-02-20 RX ADMIN — PANTOPRAZOLE SODIUM 20 MG: 20 TABLET, DELAYED RELEASE ORAL at 20:56

## 2024-02-20 NOTE — PROGRESS NOTES
[facility]  Progress Note  Name: Mathew Lozano I  MRN: [id number]  Unit/Bed#: [facility] 401-01 I Date of Admission: 2/16/2024   Date of Service: 2/20/2024 I Hospital Day: 4    Assessment/Plan   * Complicated UTI (urinary tract infection)  Assessment & Plan  Symptoms began the morning of 2/16 with weakness, hematuria, burning with urination, rigors  No prior history of UTI  UA with innumerable WBC, occasional bacteria  2/18: Currently only complaining of urinary frequency. He goes about every 2 hours.   Ultrasound kidney and bladder unremarkable    Plan  Consult IR for tunneled IJ Picc given patient's poor renal function with GFR of 30  Will need 14 days of antibiotics through infusions  Transition to daptomycin today and monitor for side effects such as rhabdomyolysis       Enterococcal bacteremia  Assessment & Plan  1/2 blood cultures positive for enterococcus faecalis and staph epidermidis  There is a possibility of contaminant as patient is not severely ill or septic appearing  WBC improved from 10.46 to 5.01      Plan:  See plan for complicated UTI      Benign prostatic hyperplasia with nocturia  Assessment & Plan  Patient describing symptoms of BPH, having to get up every few hours at night to urinate  Check PVR  Consider initiation of tamsulosin if PVR elevated  Would recommend urology referral at discharge    Stage 3b chronic kidney disease (HCC)  Assessment & Plan  Lab Results   Component Value Date    EGFR 30 02/20/2024    EGFR 28 02/19/2024    EGFR 28 02/18/2024    CREATININE 2.03 (H) 02/20/2024    CREATININE 2.14 (H) 02/19/2024    CREATININE 2.14 (H) 02/18/2024     Baseline appears to be around 2.  Creatinine 2.28 on admission, not quite an ROSELYN.  See nephrology    Plan  Continue to monitor  Recommend nephrology referral on discharge    Bandemia  Assessment & Plan  Present on admission with 9% bands, no other SIRS criteria.  Likely secondary to UTI    Essential  hypertension  Assessment & Plan  Continue home metoprolol succinate 25 mg twice daily           VTE Pharmacologic Prophylaxis: VTE Score: 5 High Risk (Score >/= 5) - Pharmacological DVT Prophylaxis Ordered: heparin. Sequential Compression Devices Ordered.    Mobility:   Basic Mobility Inpatient Raw Score: 24  JH-HLM Goal: 8: Walk 250 feet or more  JH-HLM Achieved: 6: Walk 10 steps or more  HLM Goal NOT achieved. Continue with multidisciplinary rounding and encourage appropriate mobility to improve upon HLM goals.    Patient Centered Rounds: I performed bedside rounds with nursing staff today.  Discussions with Specialists or Other Care Team Provider: ID, IR, Nephrology    Education and Discussions with Family / Patient: Updated  (wife) at bedside.    Current Length of Stay: 4 day(s)  Current Patient Status: Inpatient   Discharge Plan: Anticipate discharge tomorrow to home.    Code Status: Level 3 - DNAR and DNI    Subjective:   No acute events ON. Patient had no complaints.    Objective:     Vitals:   Temp (24hrs), Av.5 °F (36.4 °C), Min:97.2 °F (36.2 °C), Max:97.8 °F (36.6 °C)    Temp:  [97.2 °F (36.2 °C)-97.8 °F (36.6 °C)] 97.8 °F (36.6 °C)  HR:  [72-73] 73  Resp:  [18] 18  BP: (138-144)/(83-85) 138/83  SpO2:  [92 %-94 %] 94 %  Body mass index is 27.76 kg/m².     Input and Output Summary (last 24 hours):   No intake or output data in the 24 hours ending 24 1510    Physical Exam:   Physical Exam  Constitutional:       General: He is not in acute distress.     Appearance: He is normal weight. He is not ill-appearing or toxic-appearing.   HENT:      Head: Normocephalic and atraumatic.      Right Ear: External ear normal.      Left Ear: External ear normal.      Nose: Nose normal.      Mouth/Throat:      Mouth: Mucous membranes are moist.      Pharynx: Oropharynx is clear.   Eyes:      Extraocular Movements: Extraocular movements intact.      Conjunctiva/sclera: Conjunctivae normal.    Cardiovascular:      Rate and Rhythm: Normal rate and regular rhythm.      Pulses: Normal pulses.      Heart sounds: Normal heart sounds. No murmur heard.  Pulmonary:      Effort: Pulmonary effort is normal. No respiratory distress.      Breath sounds: Normal breath sounds. No wheezing, rhonchi or rales.   Abdominal:      General: Abdomen is flat. Bowel sounds are normal. There is no distension.      Palpations: Abdomen is soft.      Tenderness: There is no abdominal tenderness. There is no right CVA tenderness, left CVA tenderness, guarding or rebound.   Musculoskeletal:         General: No swelling or tenderness.      Right lower leg: No edema.      Left lower leg: No edema.   Skin:     General: Skin is warm and dry.      Capillary Refill: Capillary refill takes less than 2 seconds.   Neurological:      Mental Status: He is alert and oriented to person, place, and time. Mental status is at baseline.   Psychiatric:         Mood and Affect: Mood normal.         Behavior: Behavior normal. Behavior is cooperative.          Additional Data:     Labs:  Results from last 7 days   Lab Units 02/20/24 0456 02/17/24 0443 02/16/24 0814   WBC Thousand/uL 5.01   < > 10.46*   HEMOGLOBIN g/dL 11.9*   < > 12.5   HEMATOCRIT % 37.0   < > 38.2   PLATELETS Thousands/uL 144*   < > 140*   BANDS PCT %  --   --  9*   NEUTROS PCT % 43   < >  --    LYMPHS PCT % 37   < >  --    LYMPHO PCT %  --   --  5*   MONOS PCT % 9   < >  --    MONO PCT %  --   --  4   EOS PCT % 10*   < > 0    < > = values in this interval not displayed.     Results from last 7 days   Lab Units 02/20/24 0456 02/17/24 0443 02/16/24  0814   SODIUM mmol/L 137   < > 139   POTASSIUM mmol/L 4.0   < > 3.4*   CHLORIDE mmol/L 108   < > 106   CO2 mmol/L 22   < > 22   BUN mg/dL 24   < > 33*   CREATININE mg/dL 2.03*   < > 2.28*   ANION GAP mmol/L 7   < > 11   CALCIUM mg/dL 9.2   < > 9.0   ALBUMIN g/dL  --   --  4.2   TOTAL BILIRUBIN mg/dL  --   --  0.67   ALK PHOS U/L  --    --  55   ALT U/L  --   --  8   AST U/L  --   --  14   GLUCOSE RANDOM mg/dL 117   < > 136    < > = values in this interval not displayed.                       Lines/Drains:  Invasive Devices       Peripheral Intravenous Line  Duration             Peripheral IV 02/20/24 Left Antecubital <1 day                          Imaging: No pertinent imaging reviewed.    Recent Cultures (last 7 days):   Results from last 7 days   Lab Units 02/17/24  0858 02/16/24  1149 02/16/24  0949 02/16/24  0813   BLOOD CULTURE  No Growth at 72 hrs.  No Growth at 72 hrs. Enterococcus faecalis*  Staphylococcus epidermidis*  --  Staphylococcus epidermidis*   GRAM STAIN RESULT   --  Gram positive cocci in pairs and chains*  --  Gram positive cocci in clusters*   URINE CULTURE   --   --  50,000-59,000 cfu/ml Enterococcus faecalis*  30,000-39,000 cfu/ml Staphylococcus coagulase negative*  --        Last 24 Hours Medication List:   Current Facility-Administered Medications   Medication Dose Route Frequency Provider Last Rate    acetaminophen  650 mg Oral Q6H PRN Susan Schroeder DO      DAPTOmycin  6 mg/kg Intravenous Q24H Susan Mayberry PA-C 500 mg (02/20/24 1412)    guaiFENesin  1,200 mg Oral Q12H PRN Ab Gibson DO      heparin (porcine)  5,000 Units Subcutaneous Q8H [facility] Ab Gibson DO      loperamide  2 mg Oral 4x Daily PRN Burke Verde MD      metoprolol succinate  25 mg Oral BID Ab Gibson DO      pantoprazole  20 mg Oral QPM Ab Gibson DO      saccharomyces boulardii  250 mg Oral BID Burke Verde MD      tamsulosin  0.4 mg Oral Daily With Dinner Pilar Browning MD          Today, Patient Was Seen By: Dalton Cruz MD    **Please Note: This note may have been constructed using a voice recognition system.**

## 2024-02-20 NOTE — CASE MANAGEMENT
Case Management Discharge Planning Note    Patient name Mathew Sloan Jr.  Location W /W -01 MRN [id number]  : 1945 Date 2024       Current Admission Date: 2024  Current Admission Diagnosis:Complicated UTI (urinary tract infection)   Patient Active Problem List    Diagnosis Date Noted    Bacteremia due to Staphylococcus 2024    Bacteremia due to Enterococcus 2024    Complicated UTI (urinary tract infection) 2024    Bandemia 2024    Stage 3b chronic kidney disease (HCC) 2024    Benign prostatic hyperplasia with nocturia 2024    Essential hypertension 2021    ROSELYN (acute kidney injury) (HCC) 2021    Elevated glucose level 2021    Palpitations 2021    YESENIA (obstructive sleep apnea) 2021    Closed nondisplaced fracture of greater tuberosity of right humerus 2019    Closed fracture of rim of glenoid fossa of right scapula 2019      LOS (days): 4  Geometric Mean LOS (GMLOS) (days): 2.9  Days to GMLOS:-1.3     OBJECTIVE:  Risk of Unplanned Readmission Score: 10.01         Current admission status: Inpatient   Preferred Pharmacy:   [facility] [address] [place] [address]  Phone: [phone] Fax: [phone]    Primary Care Provider: Nick Haskins DO    Primary Insurance: MEDICARE  Secondary Insurance: COMMERCIAL MISCELLANEOUS    DISCHARGE DETAILS:    Discharge planning discussed with:: patient adn wife, Zena at bedside  Freedom of Choice: Yes  Comments - Freedom of Choice: [facility]  CM contacted family/caregiver?: Yes  Were Treatment Team discharge recommendations reviewed with patient/caregiver?: Yes  Did patient/caregiver verbalize understanding of patient care needs?: Yes  Were patient/caregiver advised of the risks associated with not following Treatment Team discharge recommendations?: Yes    Contacts  Patient Contacts:  Zahra  Relationship to Patient:: Family (wife)  Contact Method: In Person  Reason/Outcome: Continuity of Care, Emergency Contact, Discharge Planning    Requested Home Health Care         Is the patient interested in HHC at discharge?: No    DME Referral Provided  Referral made for DME?: No    Other Referral/Resources/Interventions Provided:  Interventions: Other (Specify) (Infusion Center)  Referral Comments: CM met with patient and wife Zena at bedside to discuss dcp/ OP infusion. Both wife and patient stating they would prefer [facility] infusion center. CM able to establish first SOC date for 2/22 at 1:00PM. Info added to AVS and patient and wife aware. CM to follow up as needed to continue with dcp.    Would you like to participate in our [facility] service program?  : No - Declined    Treatment Team Recommendation: Home  Discharge Destination Plan:: Home  Transport at Discharge : Family

## 2024-02-20 NOTE — PLAN OF CARE
Problem: PAIN - ADULT  Goal: Verbalizes/displays adequate comfort level or baseline comfort level  Description: Interventions:  - Encourage patient to monitor pain and request assistance  - Assess pain using appropriate pain scale  - Administer analgesics based on type and severity of pain and evaluate response  - Implement non-pharmacological measures as appropriate and evaluate response  - Consider cultural and social influences on pain and pain management  - Notify physician/advanced practitioner if interventions unsuccessful or patient reports new pain  Outcome: Progressing     Problem: INFECTION - ADULT  Goal: Absence or prevention of progression during hospitalization  Description: INTERVENTIONS:  - Assess and monitor for signs and symptoms of infection  - Monitor lab/diagnostic results  - Monitor all insertion sites, i.e. indwelling lines, tubes, and drains  - Monitor endotracheal if appropriate and nasal secretions for changes in amount and color  - [place] appropriate cooling/warming therapies per order  - Administer medications as ordered  - Instruct and encourage patient and family to use good hand hygiene technique  - Identify and instruct in appropriate isolation precautions for identified infection/condition  Outcome: Progressing  Goal: Absence of fever/infection during neutropenic period  Description: INTERVENTIONS:  - Monitor WBC    Outcome: Progressing     Problem: SAFETY ADULT  Goal: Patient will remain free of falls  Description: INTERVENTIONS:  - Educate patient/family on patient safety including physical limitations  - Instruct patient to call for assistance with activity   - Consult OT/PT to assist with strengthening/mobility   - Keep Call bell within reach  - Keep bed low and locked with side rails adjusted as appropriate  - Keep care items and personal belongings within reach  - Initiate and maintain comfort rounds  - Make Fall Risk Sign visible to staff  - Offer Toileting every  Hours,  in advance of need  - Initiate/Maintain alarm  - Obtain necessary fall risk management equipment:  - Apply yellow socks and bracelet for high fall risk patients  - Consider moving patient to room near nurses station  Outcome: Progressing  Goal: Maintain or return to baseline ADL function  Description: INTERVENTIONS:  -  Assess patient's ability to carry out ADLs; assess patient's baseline for ADL function and identify physical deficits which impact ability to perform ADLs (bathing, care of mouth/teeth, toileting, grooming, dressing, etc.)  - Assess/evaluate cause of self-care deficits   - Assess range of motion  - Assess patient's mobility; develop plan if impaired  - Assess patient's need for assistive devices and provide as appropriate  - Encourage maximum independence but intervene and supervise when necessary  - Involve family in performance of ADLs  - Assess for home care needs following discharge   - Consider OT consult to assist with ADL evaluation and planning for discharge  - Provide patient education as appropriate  Outcome: Progressing  Goal: Maintains/Returns to pre admission functional level  Description: INTERVENTIONS:  - Perform AM-PAC 6 Click Basic Mobility/ Daily Activity assessment daily.  - Set and communicate daily mobility goal to care team and patient/family/caregiver.   - Collaborate with rehabilitation services on mobility goals if consulted  - Perform Range of Motion times a day.  - Reposition patient every  hours.  - Dangle patient  times a day  - Stand patient  times a day  - Ambulate patient  times a day  - Out of bed to chair  times a day   - Out of bed for meals  times a day  - Out of bed for toileting  - Record patient progress and toleration of activity level   Outcome: Progressing     Problem: DISCHARGE PLANNING  Goal: Discharge to home or other facility with appropriate resources  Description: INTERVENTIONS:  - Identify barriers to discharge w/patient and caregiver  - Arrange for  needed discharge resources and transportation as appropriate  - Identify discharge learning needs (meds, wound care, etc.)  - Arrange for interpretive services to assist at discharge as needed  - Refer to Case Management Department for coordinating discharge planning if the patient needs post-hospital services based on physician/advanced practitioner order or complex needs related to functional status, cognitive ability, or social support system  Outcome: Progressing     Problem: Knowledge Deficit  Goal: Patient/family/caregiver demonstrates understanding of disease process, treatment plan, medications, and discharge instructions  Description: Complete learning assessment and assess knowledge base.  Interventions:  - Provide teaching at level of understanding  - Provide teaching via preferred learning methods  Outcome: Progressing

## 2024-02-20 NOTE — PROGRESS NOTES
Mathew Sloan Jr. is a 78 y.o. male who is currently ordered Vancomycin IV with management by the Pharmacy Consult service.  Relevant clinical data and objective / subjective history reviewed.  Vancomycin Assessment:  Indication and Goal AUC/Trough: Bacteremia (goal -600, trough >10)  Clinical Status:  new  Micro:     Renal Function:  SCr: 2.14 mg/dL  CrCl: 29 mL/min  Renal replacement: Not on dialysis  Days of Therapy: 1  Current Dose: 2000 mg IV loading dose given  Vancomycin Plan:  New Dosing: pulse dosing 15 mg/kg (1250 mg) IV as needed for random level < or = 15  Next Level: 2/20 @ 1400  Renal Function Monitoring: Daily BMP and UOP  Pharmacy will continue to follow closely for s/sx of nephrotoxicity, infusion reactions and appropriateness of therapy.  BMP and CBC will be ordered per protocol. We will continue to follow the patient’s culture results and clinical progress daily.    Shira Levin, Pharmacist

## 2024-02-20 NOTE — PROGRESS NOTES
Progress Note - Infectious Disease   Mathew Sloan Jr. 78 y.o. male MRN: [id number]  Unit/Bed#: [facility] 401-01 Encounter: [id number]      Impression/Plan:  1.  Gram-positive bacteremia.  With Enterococcus isolated in 1 out of 2 sets, and Staphylococcus epidermidis isolated in 2 sets.  Both of these organisms have been isolated in the urinary tract.  This suggest the possibility the patient was suffering from retention and now has a complicated UTI with complicating bacteremia.  Fortunately the patient remains hemodynamically stable and has improved.  Consideration for the possibility of an endovascular infection although this is very unlikely with the transthoracic echocardiogram negative and repeat blood cultures negative thus far. Baseline CK 41.  -Stop vancomycin  -Start IV daptomycin 6mg/kg q24h to complete 2 weeks total treatment (through 3/1/24)  -Continue to monitor renal function. Patient's CrCl is on the cusp of needing a dose reduction  -Repeat blood cultures will be NGTD x 72h today. OK for PICC placement.  -Weekly CBCD, BMP, and CK while on IV daptomycin  -Patient prefers infusion center. Will place [place] orders  -Does not need formal outpatient ID follow-up given short duration of therapy.  -Monitoring for potential antimicrobial toxicity by following CBCD and BMP with AM labs. Weekly CK.      2.  Complicated urinary tract infection.  In a patient who presented with urinary symptoms, rigors, and abnormal urinalysis and a positive urine culture.  Symptoms much improved with antibiotics.  -Antibiotics as above  -Monitor symptomatology  -Recommend urology evaluation  -Monitor postvoid residual     3.  Chronic kidney disease.  Stage IIIb.  Renal function now relatively stable.  -Dose adjusted antibiotics  -Recheck BMP to make sure no further dose adjustment needs to be done  -Volume management    Above plan was discussed in detail with patient at bedside.   Above plan was discussed in detail with primary  service, who agrees with the plan to transition to IV daptomycin to complete the course as above.    Antibiotics:  Vancomycin  Antibiotic D4 from clearance of cultures    Subjective:  Patient eager to go home. States he wants to take a shower. Reports that he doesn't currently have any complaints. Denies having fever, chills, sweats, shakes; no nausea, vomiting, abdominal pain, diarrhea, or dysuria; no cough, shortness of breath, or chest pain. No new symptoms.    Objective:  Vitals:  Temp:  [97.2 °F (36.2 °C)-97.8 °F (36.6 °C)] 97.2 °F (36.2 °C)  HR:  [72-75] 72  Resp:  [18] 18  BP: (121-144)/(72-85) 140/84  SpO2:  [92 %] 92 %  Temp (24hrs), Av.5 °F (36.4 °C), Min:97.2 °F (36.2 °C), Max:97.8 °F (36.6 °C)  Current: Temperature: (!) 97.2 °F (36.2 °C)    Physical Exam:   General Appearance:  Alert, interactive, nontoxic, no acute distress. Sitting upright in chair next to bed, eating lunch   Throat: Oropharynx moist without lesions.    Lungs:   Clear to auscultation bilaterally; no wheezes, rhonchi or rales; respirations unlabored. On room air.   Heart:  RRR; no murmur, rub or gallop.   Abdomen:   Soft, non-tender, non-distended, positive bowel sounds.     Extremities: No clubbing or cyanosis, no edema.   Skin: No new rashes, lesions, or draining wounds noted on exposed skin.     Labs, Imaging, & Other studies:   All pertinent labs and imaging studies were personally reviewed  Results from last 7 days   Lab Units 24  0456 24  0426 24  0429   WBC Thousand/uL 5.01 4.12* 3.79*   HEMOGLOBIN g/dL 11.9* 10.9* 11.3*   PLATELETS Thousands/uL 144* 119* 112*     Results from last 7 days   Lab Units 24  0456 24  0443 24  0814   POTASSIUM mmol/L 4.0   < > 3.4*   CHLORIDE mmol/L 108   < > 106   CO2 mmol/L 22   < > 22   BUN mg/dL 24   < > 33*   CREATININE mg/dL 2.03*   < > 2.28*   EGFR ml/min/1.73sq m 30   < > 26   CALCIUM mg/dL 9.2   < > 9.0   AST U/L  --   --  14   ALT U/L  --   --  8   ALK  PHOS U/L  --   --  55    < > = values in this interval not displayed.     Results from last 7 days   Lab Units 02/17/24  0858 02/16/24  1149 02/16/24  0949 02/16/24  0813   BLOOD CULTURE  No Growth at 48 hrs.  No Growth at 48 hrs. Enterococcus faecalis*  Staphylococcus epidermidis*  --  Staphylococcus epidermidis*   GRAM STAIN RESULT   --  Gram positive cocci in pairs and chains*  --  Gram positive cocci in clusters*   URINE CULTURE   --   --  50,000-59,000 cfu/ml Enterococcus faecalis*  30,000-39,000 cfu/ml Staphylococcus coagulase negative*  --                          Imaging Studies:  No new imaging studies for review       Susan Mayberry PA-C  [facility]

## 2024-02-20 NOTE — ASSESSMENT & PLAN NOTE
Symptoms began the morning of 2/16 with weakness, hematuria, burning with urination, rigors  No prior history of UTI  UA with innumerable WBC, occasional bacteria  2/18: Currently only complaining of urinary frequency. He goes about every 2 hours.   Ultrasound kidney and bladder unremarkable    Plan  Consult IR for tunneled IJ Picc given patient's poor renal function with GFR of 30  Will need 14 days of antibiotics through infusions  Transition to daptomycin today and monitor for side effects such as rhabdomyolysis

## 2024-02-20 NOTE — PLAN OF CARE
Problem: PAIN - ADULT  Goal: Verbalizes/displays adequate comfort level or baseline comfort level  Description: Interventions:  - Encourage patient to monitor pain and request assistance  - Assess pain using appropriate pain scale  - Administer analgesics based on type and severity of pain and evaluate response  - Implement non-pharmacological measures as appropriate and evaluate response  - Consider cultural and social influences on pain and pain management  - Notify physician/advanced practitioner if interventions unsuccessful or patient reports new pain  Outcome: Progressing     Problem: INFECTION - ADULT  Goal: Absence or prevention of progression during hospitalization  Description: INTERVENTIONS:  - Assess and monitor for signs and symptoms of infection  - Monitor lab/diagnostic results  - Monitor all insertion sites, i.e. indwelling lines, tubes, and drains  - Monitor endotracheal if appropriate and nasal secretions for changes in amount and color  - [place] appropriate cooling/warming therapies per order  - Administer medications as ordered  - Instruct and encourage patient and family to use good hand hygiene technique  - Identify and instruct in appropriate isolation precautions for identified infection/condition  Outcome: Progressing  Goal: Absence of fever/infection during neutropenic period  Description: INTERVENTIONS:  - Monitor WBC    Outcome: Progressing     Problem: SAFETY ADULT  Goal: Patient will remain free of falls  Description: INTERVENTIONS:  - Educate patient/family on patient safety including physical limitations  - Instruct patient to call for assistance with activity   - Consult OT/PT to assist with strengthening/mobility   - Keep Call bell within reach  - Keep bed low and locked with side rails adjusted as appropriate  - Keep care items and personal belongings within reach  - Initiate and maintain comfort rounds  - Make Fall Risk Sign visible to staff  - Offer Toileting every  Hours,  in advance of need  - Initiate/Maintain alarm  - Obtain necessary fall risk management equipment:  - Apply yellow socks and bracelet for high fall risk patients  - Consider moving patient to room near nurses station  Outcome: Progressing  Goal: Maintain or return to baseline ADL function  Description: INTERVENTIONS:  -  Assess patient's ability to carry out ADLs; assess patient's baseline for ADL function and identify physical deficits which impact ability to perform ADLs (bathing, care of mouth/teeth, toileting, grooming, dressing, etc.)  - Assess/evaluate cause of self-care deficits   - Assess range of motion  - Assess patient's mobility; develop plan if impaired  - Assess patient's need for assistive devices and provide as appropriate  - Encourage maximum independence but intervene and supervise when necessary  - Involve family in performance of ADLs  - Assess for home care needs following discharge   - Consider OT consult to assist with ADL evaluation and planning for discharge  - Provide patient education as appropriate  Outcome: Progressing  Goal: Maintains/Returns to pre admission functional level  Description: INTERVENTIONS:  - Perform AM-PAC 6 Click Basic Mobility/ Daily Activity assessment daily.  - Set and communicate daily mobility goal to care team and patient/family/caregiver.   - Collaborate with rehabilitation services on mobility goals if consulted  - Perform Range of Motion times a day.  - Reposition patient every  hours.  - Dangle patient  times a day  - Stand patient  times a day  - Ambulate patient  times a day  - Out of bed to chair  times a day   - Out of bed for meals  times a day  - Out of bed for toileting  - Record patient progress and toleration of activity level   Outcome: Progressing     Problem: DISCHARGE PLANNING  Goal: Discharge to home or other facility with appropriate resources  Description: INTERVENTIONS:  - Identify barriers to discharge w/patient and caregiver  - Arrange for  needed discharge resources and transportation as appropriate  - Identify discharge learning needs (meds, wound care, etc.)  - Arrange for interpretive services to assist at discharge as needed  - Refer to Case Management Department for coordinating discharge planning if the patient needs post-hospital services based on physician/advanced practitioner order or complex needs related to functional status, cognitive ability, or social support system  Outcome: Progressing     Problem: Knowledge Deficit  Goal: Patient/family/caregiver demonstrates understanding of disease process, treatment plan, medications, and discharge instructions  Description: Complete learning assessment and assess knowledge base.  Interventions:  - Provide teaching at level of understanding  - Provide teaching via preferred learning methods  Outcome: Progressing

## 2024-02-20 NOTE — ASSESSMENT & PLAN NOTE
1/2 blood cultures positive for enterococcus faecalis and staph epidermidis  There is a possibility of contaminant as patient is not severely ill or septic appearing  WBC improved from 10.46 to 5.01      Plan:  See plan for complicated UTI

## 2024-02-20 NOTE — ASSESSMENT & PLAN NOTE
Present on admission with 9% bands, no other SIRS criteria.  Likely secondary to UTI   Detail Level: Zone Initiate Treatment: Skin care regimen\\nAM routine\\n1. Cleanser\\n2. Vitamin C E Ferulic solution (samples provided as we ran out of the trade-size bottle today)\\n3. Moisturizer \\n4. Tinted sunscreen (pt purchased Revision Intellishade Original Tinted sunscreen today)\\n\\nPM routine\\n1. Cleanser\\n2. SkinCeuticals Discoloration Defense (ok around eyes)\\n3. Moisturizer \\n4. Tretinoin 0.05% topical cream applied to face, avoiding ocular skin, qHS\\n5. Moisturizer Samples Given: SkinCeuticals CE Ferulic, LRP Double Toleriance facial moisturizer, LRP gentle cleanser, Cerave gentle cleanser and foaming cleanser, Islizet Kayefotona ageless, Cerave tinted mineral sunscreen.

## 2024-02-20 NOTE — TREATMENT PLAN
Renal Short note-patient is being requested for peripheral PICC line.  I reviewed the patient's lab work.  He has CKD stage III-IV.  Stable.  Would recommend tunneled IJ PICC and avoid peripheral PICC.  Reviewed with primary team resident.

## 2024-02-20 NOTE — ASSESSMENT & PLAN NOTE
Lab Results   Component Value Date    EGFR 30 02/20/2024    EGFR 28 02/19/2024    EGFR 28 02/18/2024    CREATININE 2.03 (H) 02/20/2024    CREATININE 2.14 (H) 02/19/2024    CREATININE 2.14 (H) 02/18/2024     Baseline appears to be around 2.  Creatinine 2.28 on admission, not quite an ROSELYN.  See nephrology    Plan  Continue to monitor  Recommend nephrology referral on discharge

## 2024-02-21 ENCOUNTER — APPOINTMENT (INPATIENT)
Dept: RADIOLOGY | Facility: HOSPITAL | Age: 79
DRG: 690 | End: 2024-02-21
Attending: RADIOLOGY
Payer: MEDICARE

## 2024-02-21 VITALS
RESPIRATION RATE: 18 BRPM | HEART RATE: 69 BPM | DIASTOLIC BLOOD PRESSURE: 74 MMHG | OXYGEN SATURATION: 95 % | WEIGHT: 188 LBS | HEIGHT: 69 IN | SYSTOLIC BLOOD PRESSURE: 118 MMHG | BODY MASS INDEX: 27.85 KG/M2 | TEMPERATURE: 97.6 F

## 2024-02-21 PROBLEM — N39.0 COMPLICATED UTI (URINARY TRACT INFECTION): Status: RESOLVED | Noted: 2024-02-16 | Resolved: 2024-02-21

## 2024-02-21 LAB
ANION GAP SERPL CALCULATED.3IONS-SCNC: 7 MMOL/L
BACTERIA BLD CULT: ABNORMAL
BACTERIA UR CULT: ABNORMAL
BACTERIA UR CULT: ABNORMAL
BASOPHILS # BLD AUTO: 0.03 THOUSANDS/ÂΜL (ref 0–0.1)
BASOPHILS NFR BLD AUTO: 1 % (ref 0–1)
BUN SERPL-MCNC: 31 MG/DL (ref 5–25)
CALCIUM SERPL-MCNC: 9.2 MG/DL (ref 8.4–10.2)
CHLORIDE SERPL-SCNC: 106 MMOL/L (ref 96–108)
CO2 SERPL-SCNC: 25 MMOL/L (ref 21–32)
CREAT SERPL-MCNC: 2.01 MG/DL (ref 0.6–1.3)
E FAECALIS DNA BLD POS QL NAA+NON-PROBE: DETECTED
EOSINOPHIL # BLD AUTO: 0.47 THOUSAND/ÂΜL (ref 0–0.61)
EOSINOPHIL NFR BLD AUTO: 10 % (ref 0–6)
ERYTHROCYTE [DISTWIDTH] IN BLOOD BY AUTOMATED COUNT: 13 % (ref 11.6–15.1)
GFR SERPL CREATININE-BSD FRML MDRD: 30 ML/MIN/1.73SQ M
GLUCOSE SERPL-MCNC: 118 MG/DL (ref 65–140)
GRAM STN SPEC: ABNORMAL
GRAM STN SPEC: ABNORMAL
HCT VFR BLD AUTO: 34 % (ref 36.5–49.3)
HGB BLD-MCNC: 11.1 G/DL (ref 12–17)
IMM GRANULOCYTES # BLD AUTO: 0.03 THOUSAND/UL (ref 0–0.2)
IMM GRANULOCYTES NFR BLD AUTO: 1 % (ref 0–2)
INR PPP: 0.98 (ref 0.84–1.19)
LYMPHOCYTES # BLD AUTO: 1.82 THOUSANDS/ÂΜL (ref 0.6–4.47)
LYMPHOCYTES NFR BLD AUTO: 38 % (ref 14–44)
MCH RBC QN AUTO: 30.4 PG (ref 26.8–34.3)
MCHC RBC AUTO-ENTMCNC: 32.6 G/DL (ref 31.4–37.4)
MCV RBC AUTO: 93 FL (ref 82–98)
MONOCYTES # BLD AUTO: 0.43 THOUSAND/ÂΜL (ref 0.17–1.22)
MONOCYTES NFR BLD AUTO: 9 % (ref 4–12)
NEUTROPHILS # BLD AUTO: 2.04 THOUSANDS/ÂΜL (ref 1.85–7.62)
NEUTS SEG NFR BLD AUTO: 41 % (ref 43–75)
NRBC BLD AUTO-RTO: 0 /100 WBCS
PLATELET # BLD AUTO: 145 THOUSANDS/UL (ref 149–390)
PMV BLD AUTO: 9.7 FL (ref 8.9–12.7)
POTASSIUM SERPL-SCNC: 3.8 MMOL/L (ref 3.5–5.3)
PROTHROMBIN TIME: 13.6 SECONDS (ref 11.6–14.5)
RBC # BLD AUTO: 3.65 MILLION/UL (ref 3.88–5.62)
S EPIDERMIDIS DNA BLD POS QL NAA+NON-PRB: DETECTED
SODIUM SERPL-SCNC: 138 MMOL/L (ref 135–147)
WBC # BLD AUTO: 4.82 THOUSAND/UL (ref 4.31–10.16)

## 2024-02-21 PROCEDURE — 80048 BASIC METABOLIC PNL TOTAL CA: CPT

## 2024-02-21 PROCEDURE — 77001 FLUOROGUIDE FOR VEIN DEVICE: CPT | Performed by: RADIOLOGY

## 2024-02-21 PROCEDURE — C1751 CATH, INF, PER/CENT/MIDLINE: HCPCS

## 2024-02-21 PROCEDURE — 76937 US GUIDE VASCULAR ACCESS: CPT

## 2024-02-21 PROCEDURE — 85610 PROTHROMBIN TIME: CPT | Performed by: RADIOLOGY

## 2024-02-21 PROCEDURE — 97163 PT EVAL HIGH COMPLEX 45 MIN: CPT

## 2024-02-21 PROCEDURE — 36558 INSERT TUNNELED CV CATH: CPT

## 2024-02-21 PROCEDURE — 99152 MOD SED SAME PHYS/QHP 5/>YRS: CPT | Performed by: RADIOLOGY

## 2024-02-21 PROCEDURE — 85025 COMPLETE CBC W/AUTO DIFF WBC: CPT

## 2024-02-21 PROCEDURE — NC001 PR NO CHARGE: Performed by: RADIOLOGY

## 2024-02-21 PROCEDURE — 76937 US GUIDE VASCULAR ACCESS: CPT | Performed by: RADIOLOGY

## 2024-02-21 PROCEDURE — 77001 FLUOROGUIDE FOR VEIN DEVICE: CPT

## 2024-02-21 PROCEDURE — 99238 HOSP IP/OBS DSCHRG MGMT 30/<: CPT | Performed by: HOSPITALIST

## 2024-02-21 PROCEDURE — 36558 INSERT TUNNELED CV CATH: CPT | Performed by: RADIOLOGY

## 2024-02-21 PROCEDURE — 99232 SBSQ HOSP IP/OBS MODERATE 35: CPT | Performed by: INTERNAL MEDICINE

## 2024-02-21 PROCEDURE — 02HV33Z INSERTION OF INFUSION DEVICE INTO SUPERIOR VENA CAVA, PERCUTANEOUS APPROACH: ICD-10-PCS | Performed by: RADIOLOGY

## 2024-02-21 RX ORDER — FENTANYL CITRATE 50 UG/ML
INJECTION, SOLUTION INTRAMUSCULAR; INTRAVENOUS AS NEEDED
Status: COMPLETED | OUTPATIENT
Start: 2024-02-21 | End: 2024-02-21

## 2024-02-21 RX ORDER — MIDAZOLAM HYDROCHLORIDE 2 MG/2ML
INJECTION, SOLUTION INTRAMUSCULAR; INTRAVENOUS AS NEEDED
Status: COMPLETED | OUTPATIENT
Start: 2024-02-21 | End: 2024-02-21

## 2024-02-21 RX ADMIN — HEPARIN SODIUM 5000 UNITS: 5000 INJECTION INTRAVENOUS; SUBCUTANEOUS at 05:54

## 2024-02-21 RX ADMIN — MIDAZOLAM 1 MG: 1 INJECTION INTRAMUSCULAR; INTRAVENOUS at 13:24

## 2024-02-21 RX ADMIN — FENTANYL CITRATE 50 MCG: 50 INJECTION INTRAMUSCULAR; INTRAVENOUS at 13:24

## 2024-02-21 RX ADMIN — METOPROLOL SUCCINATE 25 MG: 25 TABLET, EXTENDED RELEASE ORAL at 08:10

## 2024-02-21 RX ADMIN — DAPTOMYCIN 500 MG: 500 INJECTION, POWDER, LYOPHILIZED, FOR SOLUTION INTRAVENOUS at 14:25

## 2024-02-21 RX ADMIN — HEPARIN SODIUM 5000 UNITS: 5000 INJECTION INTRAVENOUS; SUBCUTANEOUS at 14:26

## 2024-02-21 RX ADMIN — Medication 250 MG: at 08:10

## 2024-02-21 RX ADMIN — TAMSULOSIN HYDROCHLORIDE 0.4 MG: 0.4 CAPSULE ORAL at 16:25

## 2024-02-21 RX ADMIN — Medication 5 ML: at 13:27

## 2024-02-21 RX ADMIN — FENTANYL CITRATE 50 MCG: 50 INJECTION INTRAMUSCULAR; INTRAVENOUS at 13:17

## 2024-02-21 RX ADMIN — MIDAZOLAM 1 MG: 1 INJECTION INTRAMUSCULAR; INTRAVENOUS at 13:17

## 2024-02-21 NOTE — PROCEDURES
Central Line Insertion    Date/Time: 2/21/2024 1:51 PM    Performed by: Carlos Balderas MD  Authorized by: Carlos Balderas MD    Consent:     Consent obtained:  Written    Consent given by:  Patient    Risks discussed:  Bleeding and infection  Universal protocol:     Procedure explained and questions answered to patient or proxy's satisfaction: yes      Relevant documents present and verified: yes      Test results available and properly labeled: yes      Radiology Images displayed and confirmed.  If images not available, report reviewed: yes      Required blood products, implants, devices, and special equipment available: yes      Site/side marked: yes      Immediately prior to procedure, a time out was called: yes      Patient identity confirmed:  Verbally with patient  Pre-procedure details:     Hand hygiene: Hand hygiene performed prior to insertion      Sterile barrier technique: All elements of maximal sterile technique followed      Skin preparation:  2% chlorhexidine  Indications:     Central line indications: medications requiring central line    Sedation:     Sedation type:  Moderate (conscious) sedation  Anesthesia (see MAR for exact dosages):     Anesthesia method:  Local infiltration    Local anesthetic:  Lidocaine 1% WITH epi  Procedure details:     Location:  Right internal jugular    Vessel type: vein      Laterality:  Right    Approach: percutaneous technique used      Patient position:  Flat    Catheter type:  Single lumen    Catheter size:  5 Fr    Landmarks identified: yes      Ultrasound guidance: yes      Ultrasound image availability:  Images available in PACS    Sterile ultrasound techniques: Sterile gel and sterile probe covers were used      Manometry confirmation: yes      Number of attempts:  1    Successful placement: yes      Catheter tip vessel location: atriocaval junction    Post-procedure details:     Post-procedure:  Dressing applied and line sutured    Assessment:   Blood return through all ports, no pneumothorax on x-ray, free fluid flow and placement verified by x-ray    Patient tolerance of procedure:  Tolerated well, no immediate complications

## 2024-02-21 NOTE — ASSESSMENT & PLAN NOTE
Symptoms began the morning of 2/16 with weakness, hematuria, burning with urination, rigors  No prior history of UTI  UA with innumerable WBC, occasional bacteria  2/18: Currently only complaining of urinary frequency. He goes about every 2 hours.   Ultrasound kidney and bladder unremarkable    Plan  IR to place tunneled IJ PICC today  Will need 14 days of antibiotics through infusions  Continue daptomycin on day 2

## 2024-02-21 NOTE — PHYSICAL THERAPY NOTE
PT EVALUATION    Patient is not in need of further skilled inpatient PT services at this time.  Patient encouraged to ambulate ad debra. in his room and on the nursing unit with or without the roller walker as indicated.  Will DC skilled inpatient PT services.    NAME:  Mathew Sloan Jr.  AGE:   78 y.o.  Mrn:   [id number]  Length Of Stay: 5    ADMIT DX:  UTI (urinary tract infection) [N39.0]  Generalized weakness [R53.1]  Unspecified multiple injuries, initial encounter [T07.XXXA]    Past Medical History:   Diagnosis Date    Asthma     CKD (chronic kidney disease)     Hypertension     Paroxysmal A-fib (HCC)     Paroxysmal SVT (supraventricular tachycardia)      Past Surgical History:   Procedure Laterality Date    CYST REMOVAL      TONSILLECTOMY         Performed at least 2 patient identifiers during session: Name, Birthday, and ID bracelet     02/21/24 0935   PT Last Visit   PT Visit Date 02/21/24   Note Type   Note type Evaluation   Pain Assessment   Pain Assessment Tool 0-10   Pain Score No Pain   Restrictions/Precautions   Other Precautions Fall Risk;Bed Alarm;Chair Alarm   Home Living   Type of Home House   Home Layout One level;Able to live on main level with bedroom/bathroom;Performs ADLs on one level;Stairs to enter with rails  (Walking track in the basement and bookshelves he likes to frequent; 2 steps to enter; 13 steps to the basement)   Bathroom Shower/Tub Walk-in shower   Bathroom Toilet Raised   Bathroom Equipment Built-in shower seat   Bathroom Accessibility Accessible   Home Equipment Cane   Prior Function   Level of [place] Independent with ADLs;Independent with functional mobility;Independent with IADLS   Lives With Spouse   Receives Help From Family   IADLs Independent with driving;Independent with meal prep;Independent with medication management   Falls in the last 6 months 1 to 4  (1 fall day of admission, stated he could not get off the raised toilet and ended up on the floor)  "  Vocational Retired   Comments Patient normally ambulates without an assistive device prior to admission   General   Additional Pertinent History Patient is admitted with weakness, hematuria, dysuria and rigors.   Family/Caregiver Present No   Cognition   Overall Cognitive Status WFL   Arousal/Participation Cooperative   Attention Within functional limits   Orientation Level Oriented X4   Following Commands Follows all commands and directions without difficulty   Subjective   Subjective \"Getting tired of waiting for them to take me for a test\"   RLE Assessment   RLE Assessment WFL  (Grossly 3+/5)   LLE Assessment   LLE Assessment WFL  (Grossly 3+/5)   Vision-Basic Assessment   Current Vision Wears glasses only for reading   Bed Mobility   Supine to Sit 7  Independent   Additional items Bedrails;HOB elevated   Sit to Supine 7  Independent   Additional items Bedrails   Transfers   Sit to Stand 5  Supervision   Additional items Assist x 1;Verbal cues;Increased time required   Stand to Sit 5  Supervision   Additional items Assist x 1;Verbal cues;Increased time required   Ambulation/Elevation   Gait pattern Short stride;Step through pattern;Decreased foot clearance;Decreased heel strike  (Initial, generalized multidirectional unsteadiness that improved with ambulation trials)   Gait Assistance 4  Minimal assist  (Min assist/CGA initially progressing to supervision)   Additional items Assist x 1;Verbal cues;Tactile cues   Assistive Device None;Rolling walker   Distance Patient ambulated 30 feet without an assistive device with change in direction;rest; patient ambulated 30 feet with a roller walker with change in direction;rest; patient ambulated up third time x 60 feet with change in direction without an assistive device.  Gait progressively improving with ambulation trials.   Balance   Static Sitting Good   Static Standing Fair   Ambulatory   (Initially P+/F- without an assistive device, fair with roller walker then, " fair to occasional F- without an assistive device)   Activity Tolerance   Activity Tolerance Patient limited by fatigue   Assessment   Problem List Decreased strength;Decreased endurance;Impaired balance;Decreased mobility;Decreased coordination;Decreased safety awareness   Assessment Patient seen for Physical Therapy evaluation. Patient admitted with Complicated UTI (urinary tract infection).  Comorbidities affecting patient's physical performance include: Essential hypertension, CKD 3, BPH with nocturia, closed nondisplaced fracture of greater tuberosity right humerus, closed fracture of rim of glenoid fossa of right scapula, YESENIA, palpitations.  Personal factors affecting patient at time of initial evaluation include: lives in 1 story house, stairs to enter home, inability to navigate community distances, inability to navigate level surfaces without external assistance, inability to perform dynamic tasks in community, and positive fall history. Prior to admission, patient was independent with functional mobility without assistive device, independent with ADLS, independent with IADLS, living with spouse in a 1 level home with 2 steps to enter, ambulating household distance, and ambulating community distances.  Please find objective findings from Physical Therapy assessment regarding body systems outlined above with impairments and limitations including weakness, impaired balance, decreased endurance, gait deviations, decreased activity tolerance, decreased functional mobility tolerance, decreased safety awareness, and fall risk.  Patient's clinical presentation is currently unstable/unpredictable as seen in patient's presentation of increased fall risk, new onset of impairment of functional mobility, decreased endurance, and new onset of weakness.  Patient is not in need of continued skilled inpatient PT services at this time.  Patient encouraged to ambulate with and without the roller walker while admitted in his  room and in the hallway.  Will DC skilled inpatient PT services. As demonstrated by objective findings, the assigned level of complexity for this evaluation is high.    The patient's AM-[facility] Basic Mobility Inpatient Short Form Raw Score is 20. A Raw score of greater than 16 suggests the patient may benefit from discharge to home. Please also refer to the recommendation of the Physical Therapist for safe discharge planning.   Goals   Patient Goals To go home   STG Expiration Date 03/01/24   Short Term Goal #1 Patient will: Increase bilateral LE strength 1 grade to facilitate independent mobility, Perform all transfers independently consistently from various height surfaces in order to improve I w/ engagement w/ real-world environments/situations, Ambulate at least 500+ ft. without assistive device independently w/o LOB to facilitate return and engagement w/ previous living environment, Navigate flight of stairs independently with unilateral handrail to either improve independence w/ entering home and/or so patient can fully access living areas in home, Increase ambulatory and static and dynamic standing balance 1 grade to decrease risk for falls, Tolerate at least 15 consecutive minutes of activity to demonstrate improved activity tolerance and endurance, and Tolerate 3 hr OOB to faciliate upright tolerance   Plan   Treatment/Interventions ADL retraining;Functional transfer training;LE strengthening/ROM;Elevations;Therapeutic exercise;Endurance training;Patient/family training;Equipment eval/education;Bed mobility;Gait training;Compensatory technique education;Spoke to case management   PT Frequency Other (Comment)  (1 visit only)   Discharge Recommendation   Rehab Resource Intensity Level, PT No post-acute rehabilitation needs   Equipment Recommended   (Patient has a cane if needed)   AM-[facility] Basic Mobility Inpatient   Turning in Flat Bed Without Bedrails 4   Lying on Back to Sitting on Edge of Flat Bed Without  Bedrails 4   Moving Bed to Chair 3   Standing Up From Chair Using Arms 3   Walk in Room 3   Climb 3-5 Stairs With Railing 3   Basic Mobility Inpatient Raw Score 20   Basic Mobility Standardized Score 43.99   Highest Level Of Mobility   -[facility] Goal 6: Walk 10 steps or more   End of Consult   Patient Position at End of Consult Supine;All needs within reach;Bed/Chair alarm activated   Licensure   [place] License Number  Jasmyn L JERARDO Mancuso   Portions of the documentation may have been created using voice recognition software. Occasional wrong word or sound alike substitutions may have occurred due to the inherent limitation of the voice recognition software. Read the chart carefully and recognize, using context, where substitutions have occurred.

## 2024-02-21 NOTE — CASE MANAGEMENT
Case Management Discharge Planning Note    Patient name Mathew Sloan Jr.  Location W /W -01 MRN [id number]  : 1945 Date 2024       Current Admission Date: 2024  Current Admission Diagnosis:Complicated UTI (urinary tract infection)   Patient Active Problem List    Diagnosis Date Noted    Bacteremia due to Staphylococcus 2024    Bacteremia due to Enterococcus 2024    Complicated UTI (urinary tract infection) 2024    Bandemia 2024    Stage 3b chronic kidney disease (HCC) 2024    Benign prostatic hyperplasia with nocturia 2024    Essential hypertension 2021    ROSELYN (acute kidney injury) (HCC) 2021    Elevated glucose level 2021    Palpitations 2021    YESENIA (obstructive sleep apnea) 2021    Closed nondisplaced fracture of greater tuberosity of right humerus 2019    Closed fracture of rim of glenoid fossa of right scapula 2019      LOS (days): 5  Geometric Mean LOS (GMLOS) (days): 2.9  Days to GMLOS:-2.3     OBJECTIVE:  Risk of Unplanned Readmission Score: 11.4         Current admission status: Inpatient   Preferred Pharmacy:   [facility] [address] [place] [address]  Phone: [phone] Fax: [phone]    Primary Care Provider: Nick Haskins DO    Primary Insurance: MEDICARE  Secondary Insurance: COMMERCIAL MISCELLANEOUS    DISCHARGE DETAILS:    IMM Given (Date):: 24  IMM Given to:: Patient (copy provided)

## 2024-02-21 NOTE — ASSESSMENT & PLAN NOTE
Lab Results   Component Value Date    EGFR 30 02/21/2024    EGFR 30 02/20/2024    EGFR 28 02/19/2024    CREATININE 2.01 (H) 02/21/2024    CREATININE 2.03 (H) 02/20/2024    CREATININE 2.14 (H) 02/19/2024     Baseline appears to be around 2.  Creatinine 2.28 on admission, not quite an ROSELYN.  See nephrology    Plan  Continue to monitor  Recommend nephrology referral on discharge

## 2024-02-21 NOTE — PROGRESS NOTES
Progress Note - Infectious Disease   Mathew Sloan Jr. 78 y.o. male MRN: [id number]  Unit/Bed#: [facility] 401-01 Encounter: [id number]      Impression/Plan:  1.  Gram-positive bacteremia.  With Enterococcus isolated in 1 out of 2 sets, and Staphylococcus epidermidis isolated in 2 sets.  Both of these organisms have been isolated in the urinary tract.  This suggest the possibility the patient was suffering from retention and now has a complicated UTI with complicating bacteremia.  Fortunately the patient remains hemodynamically stable and has improved.  Consideration for the possibility of an endovascular infection although this is very unlikely with the transthoracic echocardiogram negative and repeat blood cultures negative thus far. Baseline CK 41. IR consulted and planning for tunneled IJ placement.  -Continue IV daptomycin 500mg q24h to complete 2 weeks total treatment (through 3/1/24)  -Continue to monitor renal function while on IV daptomycin  -Repeat blood cultures remain NGTD x 4d today. OK for line placement for antibiotics.   -Weekly CBCD, BMP, and CK while on IV daptomycin  -[facility] orders placed for patient to do daily antibiotic therapy at [facility]. Scheduled to start tomorrow (2/22)  -OK for tunneled RIJ to be removed at completion of IV antibiotics.   -Does not need formal outpatient ID follow-up given short duration of therapy.  -Monitoring for potential antimicrobial toxicity by following CBCD and BMP with AM labs. Weekly CK.      2.  Complicated urinary tract infection.  In a patient who presented with urinary symptoms, rigors, and abnormal urinalysis and a positive urine culture.  Symptoms much improved with antibiotics.  -Antibiotics as above  -Monitor symptomatology  -Recommend urology evaluation  -Monitor postvoid residual     3.  Chronic kidney disease.  Stage IIIb.  Renal function now relatively stable.  -Dose adjusted antibiotics  -Recheck BMP to make sure no further dose  adjustment needs to be done  -Volume management    Above plan was discussed in detail with patient at bedside.   Above plan was discussed in detail with primary service, who agrees with the plan to continue IV daptomycin to complete the course as above.    OK for discharge from ID standpoint.     Antibiotics:  Vancomycin  Antibiotic D5 from clearance of cultures    Subjective:  Patient states he feels well. It up and walking around the room. Reports that he is hungry because he is NPO before his line placement. No fevers, chills, N/V/D, abdominal pain, urinary symptoms. No new symptoms. Discussed his first appointment at the infusion center starting tomorrow. Wife at bedside.     Objective:  Vitals:  Temp:  [97.5 °F (36.4 °C)-97.8 °F (36.6 °C)] 97.5 °F (36.4 °C)  HR:  [72-81] 72  Resp:  [16-18] 16  BP: (114-147)/(74-90) 114/74  SpO2:  [94 %-96 %] 95 %  Temp (24hrs), Av.7 °F (36.5 °C), Min:97.5 °F (36.4 °C), Max:97.8 °F (36.6 °C)  Current: Temperature: 97.5 °F (36.4 °C)    Physical Exam:   General Appearance:  Alert, interactive, nontoxic, no acute distress. Up and walking around the room, wife at bedside.   Throat: Oropharynx moist without lesions.    Lungs:   Clear to auscultation bilaterally; no wheezes, rhonchi or rales; respirations unlabored. On room air.   Heart:  RRR; no murmur, rub or gallop.   Abdomen:   Soft, non-tender, non-distended, positive bowel sounds.     Extremities: No clubbing or cyanosis, no edema.   Skin: No new rashes, lesions, or draining wounds noted on exposed skin.     Labs, Imaging, & Other studies:   All pertinent labs and imaging studies were personally reviewed  Results from last 7 days   Lab Units 24  0557 24  0456 24  0426   WBC Thousand/uL 4.82 5.01 4.12*   HEMOGLOBIN g/dL 11.1* 11.9* 10.9*   PLATELETS Thousands/uL 145* 144* 119*     Results from last 7 days   Lab Units 24  0557 24  0443 24  0814   POTASSIUM mmol/L 3.8   < > 3.4*   CHLORIDE  mmol/L 106   < > 106   CO2 mmol/L 25   < > 22   BUN mg/dL 31*   < > 33*   CREATININE mg/dL 2.01*   < > 2.28*   EGFR ml/min/1.73sq m 30   < > 26   CALCIUM mg/dL 9.2   < > 9.0   AST U/L  --   --  14   ALT U/L  --   --  8   ALK PHOS U/L  --   --  55    < > = values in this interval not displayed.     Results from last 7 days   Lab Units 02/17/24  0858 02/16/24  1149 02/16/24  0949 02/16/24  0813   BLOOD CULTURE  No Growth at 72 hrs.  No Growth at 72 hrs. Enterococcus faecalis*  Staphylococcus epidermidis*  --  Staphylococcus epidermidis*   GRAM STAIN RESULT   --  Gram positive cocci in pairs and chains*  --  Gram positive cocci in clusters*   URINE CULTURE   --   --  50,000-59,000 cfu/ml Enterococcus faecalis*  30,000-39,000 cfu/ml Staphylococcus coagulase negative*  --                          Imaging Studies:  No new imaging studies for review       Susan Mayberry PA-C  [facility]

## 2024-02-21 NOTE — DISCHARGE INSTRUCTIONS
Tunneled Central Lines in Adult   WHAT YOU NEED TO KNOW:     A tunneled central line is a type of IV catheter. A catheter is a flexible tube used to give treatments and to take blood. A tunneled central line is a catheter you can see under your skin before it enters a vein near your heart. A tunneled central line can stay in for several months.  DISCHARGE INSTRUCTIONS:   Prevent catheter-associated infections:  The area around your catheter may get infected, or you may get an infection in your bloodstream. A catheter-associated infection is caused by bacteria getting into your bloodstream through your catheter. Infections from catheters can lead to severe illness. The following are ways you can help prevent an infection:  Wash your hands:  Use soap or an alcohol-based hand rub to clean your hands. Clean your hands before and after you touch the catheter or the catheter site. Ask your healthcare provider for information on how to wash your hands. Remind anyone who cares for your catheter to wash their hands.     Wear medical gloves:  Wear clean medical gloves when you touch your catheter or change bandages.     Limit contact:  Do not touch or handle your catheter unless you need to care for it. Do not pull, push on, or move the catheter when you clean your skin or change the bandage.    .     Check for infection:  Check your skin every day for signs of infection, such as pain, redness, swelling, and oozing. Contact your healthcare provider if you see these signs.    Cover the area:  Keep a sterile bandage over the catheter site for as long as your healthcare provider directs.      Keep the area dry:  Do not let your catheter or catheter site get wet.Cover with plastic and seal with medical tape before you bathe. Ask if you should take showers instead of bath.     Follow up with your healthcare provider as directed:  Your healthcare provider may need to take out the stitches used to keep the catheter in place. Write  down your questions so you remember to ask them during your visits.    Call 911 for any of the following:   You feel lightheaded, short of breath, and have chest pain.    Your catheter comes out    Contact your healthcare provider if:  You have a fever.     The area around where the catheter enters your skin is red, warm, painful, or oozing fluid.    You see blood on your bandage and the amount is increasing.     The veins in your neck or chest bulge. .     You see that the catheter is getting shorter, or it falls out. Put pressure over the site with a clean towel.    You see a hole or a crack in your catheter. Clamp your catheter above the damage before you contact your healthcare provider.     You have questions about how to care for your catheter.         Contact Interventional Radiology at [phone] ([facility] PATIENTS: Contact Interventional Radiology at [phone]) ([facility] PATIENTS: Contact Interventional Radiology at [phone]) if:  Blood soaks through your bandage.   You have new swelling in your arm, neck, face, or chest on your right side.  Your catheter gets wet.    Your bruises or pain get worse.   You have a fever or chills.  Persistent nausea or vomiting.   Your incision is red, swollen, or draining pus.   You have questions or concerns about your condition or care.  Self-care:     Resume your normal diet.  Keep your dressings dry. Do not take a shower or swim. You may take a tub bath, but do not get your dressings wet. Water in your wound can cause bacteria to grow and cause an infection. If your dressing gets wet, dry it off and cover it with dry sterile gauze. Call your healthcare provider. Do not use soaps or ointments.  Follow up with your healthcare provider as directed: Write down your questions so you remember to ask them during your visits.

## 2024-02-21 NOTE — ASSESSMENT & PLAN NOTE
1/2 blood cultures positive for enterococcus faecalis and staph epidermidis  There is a possibility of contaminant as patient is not severely ill or septic appearing  WBC improved from 10.46 to 4.82      Plan:  See plan for complicated UTI

## 2024-02-21 NOTE — PLAN OF CARE
Problem: PAIN - ADULT  Goal: Verbalizes/displays adequate comfort level or baseline comfort level  Description: Interventions:  - Encourage patient to monitor pain and request assistance  - Assess pain using appropriate pain scale  - Administer analgesics based on type and severity of pain and evaluate response  - Implement non-pharmacological measures as appropriate and evaluate response  - Consider cultural and social influences on pain and pain management  - Notify physician/advanced practitioner if interventions unsuccessful or patient reports new pain  Outcome: Progressing     Problem: INFECTION - ADULT  Goal: Absence or prevention of progression during hospitalization  Description: INTERVENTIONS:  - Assess and monitor for signs and symptoms of infection  - Monitor lab/diagnostic results  - Monitor all insertion sites, i.e. indwelling lines, tubes, and drains  - Monitor endotracheal if appropriate and nasal secretions for changes in amount and color  - [place] appropriate cooling/warming therapies per order  - Administer medications as ordered  - Instruct and encourage patient and family to use good hand hygiene technique  - Identify and instruct in appropriate isolation precautions for identified infection/condition  Outcome: Progressing  Goal: Absence of fever/infection during neutropenic period  Description: INTERVENTIONS:  - Monitor WBC    Outcome: Progressing     Problem: SAFETY ADULT  Goal: Patient will remain free of falls  Description: INTERVENTIONS:  - Educate patient/family on patient safety including physical limitations  - Instruct patient to call for assistance with activity   - Consult OT/PT to assist with strengthening/mobility   - Keep Call bell within reach  - Keep bed low and locked with side rails adjusted as appropriate  - Keep care items and personal belongings within reach  - Initiate and maintain comfort rounds  - Make Fall Risk Sign visible to staff  - Offer Toileting every  Hours,  in advance of need  - Initiate/Maintain alarm  - Obtain necessary fall risk management equipment:  - Apply yellow socks and bracelet for high fall risk patients  - Consider moving patient to room near nurses station  Outcome: Progressing  Goal: Maintain or return to baseline ADL function  Description: INTERVENTIONS:  -  Assess patient's ability to carry out ADLs; assess patient's baseline for ADL function and identify physical deficits which impact ability to perform ADLs (bathing, care of mouth/teeth, toileting, grooming, dressing, etc.)  - Assess/evaluate cause of self-care deficits   - Assess range of motion  - Assess patient's mobility; develop plan if impaired  - Assess patient's need for assistive devices and provide as appropriate  - Encourage maximum independence but intervene and supervise when necessary  - Involve family in performance of ADLs  - Assess for home care needs following discharge   - Consider OT consult to assist with ADL evaluation and planning for discharge  - Provide patient education as appropriate  Outcome: Progressing  Goal: Maintains/Returns to pre admission functional level  Description: INTERVENTIONS:  - Perform AM-PAC 6 Click Basic Mobility/ Daily Activity assessment daily.  - Set and communicate daily mobility goal to care team and patient/family/caregiver.   - Collaborate with rehabilitation services on mobility goals if consulted  - Perform Range of Motion times a day.  - Reposition patient every  hours.  - Dangle patient  times a day  - Stand patient  times a day  - Ambulate patient  times a day  - Out of bed to chair  times a day   - Out of bed for meals  times a day  - Out of bed for toileting  - Record patient progress and toleration of activity level   Outcome: Progressing     Problem: DISCHARGE PLANNING  Goal: Discharge to home or other facility with appropriate resources  Description: INTERVENTIONS:  - Identify barriers to discharge w/patient and caregiver  - Arrange for  needed discharge resources and transportation as appropriate  - Identify discharge learning needs (meds, wound care, etc.)  - Arrange for interpretive services to assist at discharge as needed  - Refer to Case Management Department for coordinating discharge planning if the patient needs post-hospital services based on physician/advanced practitioner order or complex needs related to functional status, cognitive ability, or social support system  Outcome: Progressing     Problem: Knowledge Deficit  Goal: Patient/family/caregiver demonstrates understanding of disease process, treatment plan, medications, and discharge instructions  Description: Complete learning assessment and assess knowledge base.  Interventions:  - Provide teaching at level of understanding  - Provide teaching via preferred learning methods  Outcome: Progressing

## 2024-02-21 NOTE — CASE MANAGEMENT
Case Management Progress Note    Patient name Mathew Sloan Jr.  Location W /W -01 MRN [id number]  : 1945 Date 2024       LOS (days): 5  Geometric Mean LOS (GMLOS) (days): 2.9  Days to GMLOS:-2.2        OBJECTIVE:        Current admission status: Inpatient  Preferred Pharmacy:   [facility] [address] [place] [id number]  Phone: [phone] Fax: [phone]    Primary Care Provider: Nick Haskins DO    Primary Insurance: MEDICARE  Secondary Insurance: COMMERCIAL MISCELLANEOUS    PROGRESS NOTE:    Weekly Care Management Length of Stay Review     Current LOS: 5 Days    Most Recent Labs:     Lab Results   Component Value Date/Time    WBC 4.82 2024 05:57 AM    HGB 11.1 (L) 2024 05:57 AM    HCT 34.0 (L) 2024 05:57 AM     (L) 2024 05:57 AM    SODIUM 138 2024 05:57 AM    K 3.8 2024 05:57 AM     2024 05:57 AM    CO2 25 2024 05:57 AM    BUN 31 (H) 2024 05:57 AM    CREATININE 2.01 (H) 2024 05:57 AM    GLUC 118 2024 05:57 AM    INR 0.98 2024 08:33 AM       Most Recent Vitals:   Vitals:    24 0713   BP: 114/74   Pulse: 72   Resp: 16   Temp: 97.5 °F (36.4 °C)   SpO2: 95%        Identified Barriers to Discharge/Discharge Goals/Care Management Interventions: UTI, IV ABX, 2 wks of ABX needed    Intended Discharge Disposition: OP infusion centers     Expected Discharge Date: today vs 24hrs

## 2024-02-21 NOTE — SEDATION DOCUMENTATION
Procedure ended. Tunneled central line placed by Dr. Balderas. Dressing to site. Report and care assumed by primary RN

## 2024-02-21 NOTE — PLAN OF CARE
Problem: PAIN - ADULT  Goal: Verbalizes/displays adequate comfort level or baseline comfort level  Description: Interventions:  - Encourage patient to monitor pain and request assistance  - Assess pain using appropriate pain scale  - Administer analgesics based on type and severity of pain and evaluate response  - Implement non-pharmacological measures as appropriate and evaluate response  - Consider cultural and social influences on pain and pain management  - Notify physician/advanced practitioner if interventions unsuccessful or patient reports new pain  Outcome: Progressing     Problem: INFECTION - ADULT  Goal: Absence or prevention of progression during hospitalization  Description: INTERVENTIONS:  - Assess and monitor for signs and symptoms of infection  - Monitor lab/diagnostic results  - Monitor all insertion sites, i.e. indwelling lines, tubes, and drains  - Monitor endotracheal if appropriate and nasal secretions for changes in amount and color  - [place] appropriate cooling/warming therapies per order  - Administer medications as ordered  - Instruct and encourage patient and family to use good hand hygiene technique  - Identify and instruct in appropriate isolation precautions for identified infection/condition  Outcome: Progressing  Goal: Absence of fever/infection during neutropenic period  Description: INTERVENTIONS:  - Monitor WBC    Outcome: Progressing     Problem: SAFETY ADULT  Goal: Patient will remain free of falls  Description: INTERVENTIONS:  - Educate patient/family on patient safety including physical limitations  - Instruct patient to call for assistance with activity   - Consult OT/PT to assist with strengthening/mobility   - Keep Call bell within reach  - Keep bed low and locked with side rails adjusted as appropriate  - Keep care items and personal belongings within reach  - Initiate and maintain comfort rounds  - Make Fall Risk Sign visible to staff  - Offer Toileting every  Hours,  in advance of need  - Initiate/Maintain alarm  - Obtain necessary fall risk management equipment:  - Apply yellow socks and bracelet for high fall risk patients  - Consider moving patient to room near nurses station  Outcome: Progressing  Goal: Maintain or return to baseline ADL function  Description: INTERVENTIONS:  -  Assess patient's ability to carry out ADLs; assess patient's baseline for ADL function and identify physical deficits which impact ability to perform ADLs (bathing, care of mouth/teeth, toileting, grooming, dressing, etc.)  - Assess/evaluate cause of self-care deficits   - Assess range of motion  - Assess patient's mobility; develop plan if impaired  - Assess patient's need for assistive devices and provide as appropriate  - Encourage maximum independence but intervene and supervise when necessary  - Involve family in performance of ADLs  - Assess for home care needs following discharge   - Consider OT consult to assist with ADL evaluation and planning for discharge  - Provide patient education as appropriate  Outcome: Progressing  Goal: Maintains/Returns to pre admission functional level  Description: INTERVENTIONS:  - Perform AM-PAC 6 Click Basic Mobility/ Daily Activity assessment daily.  - Set and communicate daily mobility goal to care team and patient/family/caregiver.   - Collaborate with rehabilitation services on mobility goals if consulted  - Perform Range of Motion times a day.  - Reposition patient every  hours.  - Dangle patient  times a day  - Stand patient  times a day  - Ambulate patient  times a day  - Out of bed to chair  times a day   - Out of bed for meals  times a day  - Out of bed for toileting  - Record patient progress and toleration of activity level   Outcome: Progressing     Problem: DISCHARGE PLANNING  Goal: Discharge to home or other facility with appropriate resources  Description: INTERVENTIONS:  - Identify barriers to discharge w/patient and caregiver  - Arrange for  needed discharge resources and transportation as appropriate  - Identify discharge learning needs (meds, wound care, etc.)  - Arrange for interpretive services to assist at discharge as needed  - Refer to Case Management Department for coordinating discharge planning if the patient needs post-hospital services based on physician/advanced practitioner order or complex needs related to functional status, cognitive ability, or social support system  Outcome: Progressing     Problem: Knowledge Deficit  Goal: Patient/family/caregiver demonstrates understanding of disease process, treatment plan, medications, and discharge instructions  Description: Complete learning assessment and assess knowledge base.  Interventions:  - Provide teaching at level of understanding  - Provide teaching via preferred learning methods  Outcome: Progressing

## 2024-02-22 ENCOUNTER — HOSPITAL ENCOUNTER (OUTPATIENT)
Dept: INFUSION CENTER | Facility: CLINIC | Age: 79
Discharge: HOME | End: 2024-02-22
Payer: MEDICARE

## 2024-02-22 VITALS
HEART RATE: 69 BPM | DIASTOLIC BLOOD PRESSURE: 75 MMHG | OXYGEN SATURATION: 96 % | RESPIRATION RATE: 18 BRPM | SYSTOLIC BLOOD PRESSURE: 130 MMHG | TEMPERATURE: 98.1 F

## 2024-02-22 DIAGNOSIS — R78.81 BACTEREMIA DUE TO STAPHYLOCOCCUS: ICD-10-CM

## 2024-02-22 DIAGNOSIS — B95.2 BACTEREMIA DUE TO ENTEROCOCCUS: Primary | ICD-10-CM

## 2024-02-22 DIAGNOSIS — R78.81 BACTEREMIA DUE TO ENTEROCOCCUS: Primary | ICD-10-CM

## 2024-02-22 DIAGNOSIS — B95.8 BACTEREMIA DUE TO STAPHYLOCOCCUS: ICD-10-CM

## 2024-02-22 LAB
BACTERIA BLD CULT: NORMAL
BACTERIA BLD CULT: NORMAL

## 2024-02-22 PROCEDURE — 96365 THER/PROPH/DIAG IV INF INIT: CPT

## 2024-02-22 RX ORDER — SODIUM CHLORIDE 9 MG/ML
20 INJECTION, SOLUTION INTRAVENOUS ONCE
Status: COMPLETED | OUTPATIENT
Start: 2024-02-22 | End: 2024-02-22

## 2024-02-22 RX ORDER — SODIUM CHLORIDE 9 MG/ML
20 INJECTION, SOLUTION INTRAVENOUS ONCE
Status: CANCELLED | OUTPATIENT
Start: 2024-02-23

## 2024-02-22 RX ADMIN — SODIUM CHLORIDE 20 ML/HR: 0.9 INJECTION, SOLUTION INTRAVENOUS at 13:08

## 2024-02-22 RX ADMIN — DAPTOMYCIN 500 MG: 500 INJECTION, POWDER, LYOPHILIZED, FOR SOLUTION INTRAVENOUS at 13:12

## 2024-02-22 NOTE — PROGRESS NOTES
Patient is here for IV antibiotics. He offers no complaints at this time. Labs ordered weekly and dated for 2/23/24

## 2024-02-22 NOTE — PROGRESS NOTES
Patient tolerated his treatment without any adverse reactions. Right chest central line dressing changed per protocol. Next appointment confirmed 2/23/24 at 1230 at [facility]. Patient declined avs

## 2024-02-23 ENCOUNTER — HOSPITAL ENCOUNTER (OUTPATIENT)
Dept: INFUSION CENTER | Facility: CLINIC | Age: 79
End: 2024-02-23
Payer: MEDICARE

## 2024-02-23 DIAGNOSIS — R78.81 BACTEREMIA DUE TO ENTEROCOCCUS: Primary | ICD-10-CM

## 2024-02-23 DIAGNOSIS — B95.2 BACTEREMIA DUE TO ENTEROCOCCUS: Primary | ICD-10-CM

## 2024-02-23 DIAGNOSIS — B95.8 BACTEREMIA DUE TO STAPHYLOCOCCUS: ICD-10-CM

## 2024-02-23 DIAGNOSIS — R78.81 BACTEREMIA DUE TO STAPHYLOCOCCUS: ICD-10-CM

## 2024-02-23 LAB
ALBUMIN SERPL BCP-MCNC: 4.1 G/DL (ref 3.5–5)
ALP SERPL-CCNC: 48 U/L (ref 34–104)
ALT SERPL W P-5'-P-CCNC: 61 U/L (ref 7–52)
ANION GAP SERPL CALCULATED.3IONS-SCNC: 4 MMOL/L
AST SERPL W P-5'-P-CCNC: 41 U/L (ref 13–39)
BASOPHILS # BLD AUTO: 0.05 THOUSANDS/ÂΜL (ref 0–0.1)
BASOPHILS NFR BLD AUTO: 1 % (ref 0–1)
BILIRUB SERPL-MCNC: 0.51 MG/DL (ref 0.2–1)
BUN SERPL-MCNC: 29 MG/DL (ref 5–25)
CALCIUM SERPL-MCNC: 9.2 MG/DL (ref 8.4–10.2)
CHLORIDE SERPL-SCNC: 107 MMOL/L (ref 96–108)
CK SERPL-CCNC: 52 U/L (ref 39–308)
CO2 SERPL-SCNC: 26 MMOL/L (ref 21–32)
CREAT SERPL-MCNC: 2.14 MG/DL (ref 0.6–1.3)
EOSINOPHIL # BLD AUTO: 0.46 THOUSAND/ÂΜL (ref 0–0.61)
EOSINOPHIL NFR BLD AUTO: 7 % (ref 0–6)
ERYTHROCYTE [DISTWIDTH] IN BLOOD BY AUTOMATED COUNT: 13.2 % (ref 11.6–15.1)
GFR SERPL CREATININE-BSD FRML MDRD: 28 ML/MIN/1.73SQ M
GLUCOSE SERPL-MCNC: 98 MG/DL (ref 65–140)
HCT VFR BLD AUTO: 36.5 % (ref 36.5–49.3)
HGB BLD-MCNC: 11.7 G/DL (ref 12–17)
IMM GRANULOCYTES # BLD AUTO: 0.03 THOUSAND/UL (ref 0–0.2)
IMM GRANULOCYTES NFR BLD AUTO: 1 % (ref 0–2)
LYMPHOCYTES # BLD AUTO: 1.77 THOUSANDS/ÂΜL (ref 0.6–4.47)
LYMPHOCYTES NFR BLD AUTO: 28 % (ref 14–44)
MCH RBC QN AUTO: 30.3 PG (ref 26.8–34.3)
MCHC RBC AUTO-ENTMCNC: 32.1 G/DL (ref 31.4–37.4)
MCV RBC AUTO: 95 FL (ref 82–98)
MONOCYTES # BLD AUTO: 0.51 THOUSAND/ÂΜL (ref 0.17–1.22)
MONOCYTES NFR BLD AUTO: 8 % (ref 4–12)
NEUTROPHILS # BLD AUTO: 3.44 THOUSANDS/ÂΜL (ref 1.85–7.62)
NEUTS SEG NFR BLD AUTO: 55 % (ref 43–75)
NRBC BLD AUTO-RTO: 0 /100 WBCS
PLATELET # BLD AUTO: 171 THOUSANDS/UL (ref 149–390)
PMV BLD AUTO: 9.1 FL (ref 8.9–12.7)
POTASSIUM SERPL-SCNC: 4 MMOL/L (ref 3.5–5.3)
PROT SERPL-MCNC: 7.5 G/DL (ref 6.4–8.4)
RBC # BLD AUTO: 3.86 MILLION/UL (ref 3.88–5.62)
SODIUM SERPL-SCNC: 137 MMOL/L (ref 135–147)
WBC # BLD AUTO: 6.26 THOUSAND/UL (ref 4.31–10.16)

## 2024-02-23 PROCEDURE — 85025 COMPLETE CBC W/AUTO DIFF WBC: CPT | Performed by: INTERNAL MEDICINE

## 2024-02-23 PROCEDURE — 80053 COMPREHEN METABOLIC PANEL: CPT | Performed by: INTERNAL MEDICINE

## 2024-02-23 PROCEDURE — 96365 THER/PROPH/DIAG IV INF INIT: CPT

## 2024-02-23 PROCEDURE — 82550 ASSAY OF CK (CPK): CPT | Performed by: INTERNAL MEDICINE

## 2024-02-23 RX ORDER — SODIUM CHLORIDE 9 MG/ML
20 INJECTION, SOLUTION INTRAVENOUS ONCE
Status: COMPLETED | OUTPATIENT
Start: 2024-02-23 | End: 2024-02-23

## 2024-02-23 RX ORDER — SODIUM CHLORIDE 9 MG/ML
20 INJECTION, SOLUTION INTRAVENOUS ONCE
Status: CANCELLED | OUTPATIENT
Start: 2024-02-24

## 2024-02-23 RX ADMIN — DAPTOMYCIN 500 MG: 500 INJECTION, POWDER, LYOPHILIZED, FOR SOLUTION INTRAVENOUS at 12:25

## 2024-02-23 RX ADMIN — SODIUM CHLORIDE 20 ML/HR: 0.9 INJECTION, SOLUTION INTRAVENOUS at 12:15

## 2024-02-23 NOTE — PROGRESS NOTES
Tolerated infusion without incident: No adverse reactions noted: Verified follow up appt with patient ( 02/24/24 ): AVS offered and declined

## 2024-02-23 NOTE — PROGRESS NOTES
Patient to Infusion Center for Lab Testing / Daptomycin: Offers no complaints at present time: Right CVC noted: Good blood return: Flushes without difficulty: Labs drawn per MD order

## 2024-02-24 ENCOUNTER — HOSPITAL ENCOUNTER (OUTPATIENT)
Dept: INFUSION CENTER | Facility: CLINIC | Age: 79
Discharge: HOME | End: 2024-02-24
Payer: MEDICARE

## 2024-02-24 VITALS
OXYGEN SATURATION: 96 % | RESPIRATION RATE: 18 BRPM | DIASTOLIC BLOOD PRESSURE: 83 MMHG | SYSTOLIC BLOOD PRESSURE: 122 MMHG | HEART RATE: 58 BPM | TEMPERATURE: 98.2 F

## 2024-02-24 DIAGNOSIS — B95.8 BACTEREMIA DUE TO STAPHYLOCOCCUS: ICD-10-CM

## 2024-02-24 DIAGNOSIS — R78.81 BACTEREMIA DUE TO STAPHYLOCOCCUS: ICD-10-CM

## 2024-02-24 DIAGNOSIS — B95.2 BACTEREMIA DUE TO ENTEROCOCCUS: Primary | ICD-10-CM

## 2024-02-24 DIAGNOSIS — R78.81 BACTEREMIA DUE TO ENTEROCOCCUS: Primary | ICD-10-CM

## 2024-02-24 RX ORDER — SODIUM CHLORIDE 9 MG/ML
20 INJECTION, SOLUTION INTRAVENOUS ONCE
Status: CANCELLED | OUTPATIENT
Start: 2024-02-25

## 2024-02-24 RX ORDER — SODIUM CHLORIDE 9 MG/ML
20 INJECTION, SOLUTION INTRAVENOUS ONCE
Status: COMPLETED | OUTPATIENT
Start: 2024-02-24 | End: 2024-02-24

## 2024-02-24 RX ADMIN — DAPTOMYCIN 500 MG: 500 INJECTION, POWDER, LYOPHILIZED, FOR SOLUTION INTRAVENOUS at 07:55

## 2024-02-24 RX ADMIN — SODIUM CHLORIDE 20 ML/HR: 0.9 INJECTION, SOLUTION INTRAVENOUS at 07:55

## 2024-02-24 NOTE — PROGRESS NOTES
Pt tolerated Antibiotic treatment well. Aware of next appt 2/25/24 at 0900. AVS declined. Pt has mychart.

## 2024-02-24 NOTE — PROGRESS NOTES
R chest tunneled PICC remains with brisk blood return, flushed well. Small amount serosanguinous drainage noted to insertion site. No redness, patient offers no acute complaints. IV antibiotics infusing. Patient resting on recliner chair, call bell within reach.

## 2024-02-25 ENCOUNTER — HOSPITAL ENCOUNTER (OUTPATIENT)
Dept: INFUSION CENTER | Facility: CLINIC | Age: 79
Discharge: HOME | End: 2024-02-25
Payer: MEDICARE

## 2024-02-25 VITALS
SYSTOLIC BLOOD PRESSURE: 145 MMHG | HEART RATE: 67 BPM | OXYGEN SATURATION: 96 % | RESPIRATION RATE: 18 BRPM | DIASTOLIC BLOOD PRESSURE: 82 MMHG | TEMPERATURE: 97.9 F

## 2024-02-25 DIAGNOSIS — B95.8 BACTEREMIA DUE TO STAPHYLOCOCCUS: ICD-10-CM

## 2024-02-25 DIAGNOSIS — R78.81 BACTEREMIA DUE TO ENTEROCOCCUS: Primary | ICD-10-CM

## 2024-02-25 DIAGNOSIS — B95.2 BACTEREMIA DUE TO ENTEROCOCCUS: Primary | ICD-10-CM

## 2024-02-25 DIAGNOSIS — R78.81 BACTEREMIA DUE TO STAPHYLOCOCCUS: ICD-10-CM

## 2024-02-25 RX ORDER — SODIUM CHLORIDE 9 MG/ML
20 INJECTION, SOLUTION INTRAVENOUS ONCE
Status: COMPLETED | OUTPATIENT
Start: 2024-02-25 | End: 2024-02-25

## 2024-02-25 RX ORDER — SODIUM CHLORIDE 9 MG/ML
20 INJECTION, SOLUTION INTRAVENOUS ONCE
Status: CANCELLED | OUTPATIENT
Start: 2024-02-26

## 2024-02-25 RX ADMIN — SODIUM CHLORIDE 20 ML/HR: 0.9 INJECTION, SOLUTION INTRAVENOUS at 08:34

## 2024-02-25 RX ADMIN — DAPTOMYCIN 500 MG: 500 INJECTION, POWDER, LYOPHILIZED, FOR SOLUTION INTRAVENOUS at 08:34

## 2024-02-25 NOTE — PROGRESS NOTES
Patient to infusion for IV Antibiotics.  He offers no complaints.  PICC flushed, good blood return noted.  Small amount serosanguinous drainage again noted. He tolerated treatment today, next appointment confirmed tomorrow 1pm, he declined AVS

## 2024-02-25 NOTE — PLAN OF CARE
Problem: Knowledge Deficit  Goal: Patient/family/caregiver demonstrates understanding of disease process, treatment plan, medications, and discharge instructions  Description: Complete learning assessment and assess knowledge base.  Interventions:  - Provide teaching at level of understanding  - Provide teaching via preferred learning methods  Outcome: Progressing      [id number], [id number], BEART, [id number]     Type & Screen (If Applicable):  No results found for: LABABO, LABRH    Drug/Infectious Status (If Applicable):  No results found for: HIV, HEPCAB    COVID-19 Screening (If Applicable): No results found for: COVID19        Anesthesia Evaluation   no history of anesthetic complications:   Airway: Mallampati: III  TM distance: >3 FB     Mouth opening: > = 3 FB   Dental:      Comment: Denies loose teeth    Pulmonary:       (-) shortness of breath, rhonchi, wheezes, rales and not a current smoker          Patient did not smoke on day of surgery. Cardiovascular:    (+) hypertension:,     (-) pacemaker, past MI, CABG/stent,  angina, weak pulses and peripheral edema      Rhythm: regular  Rate: normal           Beta Blocker:  Not on Beta Blocker         Neuro/Psych:      (-) seizures and CVA           GI/Hepatic/Renal: Neg GI/Hepatic/Renal ROS       (-) liver disease and no renal disease       Endo/Other:    (+) hypothyroidism::., .    (-) diabetes mellitus, hyperthyroidism               Abdominal:             Vascular: Other Findings: Hard of hearing, well-appearing. Anesthesia Plan      MAC     ASA 2     (Patient is very hard of hearing. Tolerated procedure on left eye with 2mg midazolam.  NPO appropriate. Mr. Sonya Dunn denies active nausea / reflux.)        Anesthetic plan and risks discussed with patient. Plan discussed with CRNA. This pre-anesthesia assessment may be used as a history and physical.    DOS STAFF ADDENDUM:    Pt seen and examined, chart reviewed (including anesthesia, drug and allergy history). No interval changes to history and physical examination. Anesthetic plan, risks, benefits, alternatives, and personnel involved discussed with patient. Patient verbalized an understanding and agrees to proceed.       Abraham Rome MD  March 23, 2023  1:55 PM

## 2024-02-26 ENCOUNTER — HOSPITAL ENCOUNTER (OUTPATIENT)
Dept: INFUSION CENTER | Facility: CLINIC | Age: 79
Discharge: HOME | End: 2024-02-26
Payer: MEDICARE

## 2024-02-26 VITALS
SYSTOLIC BLOOD PRESSURE: 147 MMHG | RESPIRATION RATE: 20 BRPM | DIASTOLIC BLOOD PRESSURE: 74 MMHG | TEMPERATURE: 97.2 F | HEART RATE: 69 BPM

## 2024-02-26 DIAGNOSIS — R78.81 BACTEREMIA DUE TO ENTEROCOCCUS: Primary | ICD-10-CM

## 2024-02-26 DIAGNOSIS — B95.2 BACTEREMIA DUE TO ENTEROCOCCUS: Primary | ICD-10-CM

## 2024-02-26 DIAGNOSIS — R78.81 BACTEREMIA DUE TO STAPHYLOCOCCUS: ICD-10-CM

## 2024-02-26 DIAGNOSIS — B95.8 BACTEREMIA DUE TO STAPHYLOCOCCUS: ICD-10-CM

## 2024-02-26 DIAGNOSIS — Z45.2 PICC (PERIPHERALLY INSERTED CENTRAL CATHETER) REMOVAL: Primary | ICD-10-CM

## 2024-02-26 PROCEDURE — 96365 THER/PROPH/DIAG IV INF INIT: CPT

## 2024-02-26 RX ORDER — SODIUM CHLORIDE 9 MG/ML
20 INJECTION, SOLUTION INTRAVENOUS ONCE
Status: CANCELLED | OUTPATIENT
Start: 2024-02-27

## 2024-02-26 RX ORDER — SODIUM CHLORIDE 9 MG/ML
20 INJECTION, SOLUTION INTRAVENOUS ONCE
Status: COMPLETED | OUTPATIENT
Start: 2024-02-26 | End: 2024-02-26

## 2024-02-26 RX ADMIN — SODIUM CHLORIDE 20 ML/HR: 0.9 INJECTION, SOLUTION INTRAVENOUS at 12:54

## 2024-02-26 RX ADMIN — DAPTOMYCIN 500 MG: 500 INJECTION, POWDER, LYOPHILIZED, FOR SOLUTION INTRAVENOUS at 12:54

## 2024-02-26 NOTE — PROGRESS NOTES
Spoke with IR to confirm that on Friday 3/1/24 after patient's last ordered dosing of Cubicin, patient will proceed to IR to have tunneled PICC line removed as per treatment plan ordered. Patient appt time with us adjusted to 1400 to ensure enough time in IR.

## 2024-02-26 NOTE — PATIENT INSTRUCTIONS
February 2024 Sunday Monday Tuesday Wednesday Thursday Friday Saturday                       1     2     3                4     5     6     7     8     9     10                11     12     13     14     15     16    Admission   7:50 AM   Shae Howard MD   [facility]   (Discharge: 2/21/2024)    XR GENERAL PROCEDURE   8:20 AM   (15 min.)   AN XR ED 1   formerly [facility] Radiology 17                18    US KIDNEY AND BLADDER   7:25 AM   (30 min.)   AN US 4   formerly [facility] Ultrasound    ECHO COMPLETE   9:00 AM   (60 min.)   AN ECHO PORT 1   formerly [facility] Cardiology 19     20     21    IR TUNNELED CENTRAL LINE PLACEMENT     (75 min.)   AN IR 1   formerly [facility] Interventional Radiology 22    INF THERAPY PLAN   1:00 PM   (60 min.)   AN INF CHAIR 15   [facility] 23    INF THERAPY PLAN  12:30 PM   (60 min.)   AN INF CHAIR 3   [facility] 24    INF THERAPY PLAN   8:00 AM   (60 min.)   AN INF CHAIR 3   [facility]       Cycle 1, Treatment 1 Cycle 1, Treatment 2 Cycle 1, Treatment 3   25    INF THERAPY PLAN   9:00 AM   (60 min.)   AN INF WEEKEND/HOLIDAY CHAIR   [facility] 26    INF THERAPY PLAN   1:00 PM   (60 min.)   AN INF CHAIR 10   [facility] 27    INF THERAPY PLAN   1:00 PM   (60 min.)   AN INF CHAIR 14   [facility] 28    INF THERAPY PLAN   3:30 PM   (60 min.)   AN INF CHAIR 16   [facility] 29    INF THERAPY PLAN   3:30 PM   (60 min.)   AN INF CHAIR 16   [facility]             Cycle 1, Treatment 4 Cycle 1, Treatment 5               Treatment Details         2/22/2024 - Cycle 1, Treatment 1      Supportive Care: DAPTOMYCIN IVPB IN 50  ML    2/23/2024 - Cycle 1, Treatment 2      Supportive Care: DAPTOMYCIN IVPB IN 50 ML    2/24/2024 - Cycle 1, Treatment 3      Supportive Care: DAPTOMYCIN IVPB IN 50 ML    2/25/2024 - Cycle 1, Treatment 4      Supportive Care: DAPTOMYCIN IVPB IN 50 ML    2/26/2024 - Cycle 1, Treatment 5      Supportive Care: DAPTOMYCIN IVPB IN 50 ML        March 2024 Sunday Monday Tuesday Wednesday Thursday Friday Saturday                            1    INF THERAPY PLAN   3:00 PM   (60 min.)   AN INF CHAIR 17   [facility] 2                3     4     5     6     7     8     9                10     11     12     13     14     15     16                17     18     19     20     21     22     23                24     25     26     27     28     29     30                31

## 2024-02-26 NOTE — PLAN OF CARE
Problem: Potential for Falls  Goal: Patient will remain free of falls  Description: INTERVENTIONS:  - Educate patient/family on patient safety including physical limitations  - Instruct patient to call for assistance with activity   - Consult OT/PT to assist with strengthening/mobility   - Keep Call bell within reach  - Keep bed low and locked with side rails adjusted as appropriate  - Keep care items and personal belongings within reach  - Initiate and maintain comfort rounds  - Make Fall Risk Sign visible to staff  Apply yellow socks and bracelet for high fall risk patients  - Consider moving patient to room near nurses station  Outcome: Progressing     Problem: Knowledge Deficit  Goal: Patient/family/caregiver demonstrates understanding of disease process, treatment plan, medications, and discharge instructions  Description: Complete learning assessment and assess knowledge base.  Interventions:  - Provide teaching at level of understanding  - Provide teaching via preferred learning methods  Outcome: Progressing

## 2024-02-26 NOTE — PROGRESS NOTES
Patient here for daily antibiotic dosing and is well, no c/o or changes to report, antibx infusing as ordered, labs due on Friday 3/1/24, PICC dressing change due 2/29/24.

## 2024-02-27 ENCOUNTER — HOSPITAL ENCOUNTER (OUTPATIENT)
Dept: INFUSION CENTER | Facility: CLINIC | Age: 79
Discharge: HOME | End: 2024-02-27
Payer: MEDICARE

## 2024-02-27 VITALS
OXYGEN SATURATION: 95 % | HEART RATE: 76 BPM | TEMPERATURE: 97.5 F | RESPIRATION RATE: 18 BRPM | DIASTOLIC BLOOD PRESSURE: 64 MMHG | SYSTOLIC BLOOD PRESSURE: 128 MMHG

## 2024-02-27 DIAGNOSIS — R78.81 BACTEREMIA DUE TO STAPHYLOCOCCUS: ICD-10-CM

## 2024-02-27 DIAGNOSIS — R78.81 BACTEREMIA DUE TO ENTEROCOCCUS: Primary | ICD-10-CM

## 2024-02-27 DIAGNOSIS — B95.8 BACTEREMIA DUE TO STAPHYLOCOCCUS: ICD-10-CM

## 2024-02-27 DIAGNOSIS — B95.2 BACTEREMIA DUE TO ENTEROCOCCUS: Primary | ICD-10-CM

## 2024-02-27 PROCEDURE — 96365 THER/PROPH/DIAG IV INF INIT: CPT

## 2024-02-27 RX ORDER — SODIUM CHLORIDE 9 MG/ML
20 INJECTION, SOLUTION INTRAVENOUS ONCE
Status: COMPLETED | OUTPATIENT
Start: 2024-02-27 | End: 2024-02-27

## 2024-02-27 RX ORDER — SODIUM CHLORIDE 9 MG/ML
20 INJECTION, SOLUTION INTRAVENOUS ONCE
Status: CANCELLED | OUTPATIENT
Start: 2024-02-28

## 2024-02-27 RX ADMIN — DAPTOMYCIN 500 MG: 500 INJECTION, POWDER, LYOPHILIZED, FOR SOLUTION INTRAVENOUS at 12:50

## 2024-02-27 RX ADMIN — SODIUM CHLORIDE 20 ML/HR: 9 INJECTION, SOLUTION INTRAVENOUS at 12:51

## 2024-02-27 NOTE — PROGRESS NOTES
Patient tolerated antibiotic infusion without incident. He will return tomorrow at 3:30, declined AVS.

## 2024-02-28 ENCOUNTER — HOSPITAL ENCOUNTER (OUTPATIENT)
Dept: INFUSION CENTER | Facility: CLINIC | Age: 79
Discharge: HOME | End: 2024-02-28
Payer: MEDICARE

## 2024-02-28 VITALS
OXYGEN SATURATION: 95 % | SYSTOLIC BLOOD PRESSURE: 133 MMHG | DIASTOLIC BLOOD PRESSURE: 79 MMHG | TEMPERATURE: 97.7 F | RESPIRATION RATE: 18 BRPM | HEART RATE: 68 BPM

## 2024-02-28 DIAGNOSIS — R78.81 BACTEREMIA DUE TO ENTEROCOCCUS: Primary | ICD-10-CM

## 2024-02-28 DIAGNOSIS — B95.8 BACTEREMIA DUE TO STAPHYLOCOCCUS: ICD-10-CM

## 2024-02-28 DIAGNOSIS — R78.81 BACTEREMIA DUE TO STAPHYLOCOCCUS: ICD-10-CM

## 2024-02-28 DIAGNOSIS — B95.2 BACTEREMIA DUE TO ENTEROCOCCUS: Primary | ICD-10-CM

## 2024-02-28 PROCEDURE — 96365 THER/PROPH/DIAG IV INF INIT: CPT

## 2024-02-28 RX ORDER — SODIUM CHLORIDE 9 MG/ML
20 INJECTION, SOLUTION INTRAVENOUS ONCE
Status: CANCELLED | OUTPATIENT
Start: 2024-02-29

## 2024-02-28 RX ORDER — SODIUM CHLORIDE 9 MG/ML
20 INJECTION, SOLUTION INTRAVENOUS ONCE
Status: COMPLETED | OUTPATIENT
Start: 2024-02-28 | End: 2024-02-28

## 2024-02-28 RX ADMIN — DAPTOMYCIN 500 MG: 500 INJECTION, POWDER, LYOPHILIZED, FOR SOLUTION INTRAVENOUS at 15:11

## 2024-02-28 RX ADMIN — SODIUM CHLORIDE 20 ML/HR: 0.9 INJECTION, SOLUTION INTRAVENOUS at 15:10

## 2024-02-29 ENCOUNTER — HOSPITAL ENCOUNTER (OUTPATIENT)
Dept: INFUSION CENTER | Facility: CLINIC | Age: 79
Discharge: HOME | End: 2024-02-29
Payer: MEDICARE

## 2024-02-29 VITALS
TEMPERATURE: 97.8 F | RESPIRATION RATE: 18 BRPM | OXYGEN SATURATION: 96 % | SYSTOLIC BLOOD PRESSURE: 123 MMHG | HEART RATE: 65 BPM | DIASTOLIC BLOOD PRESSURE: 79 MMHG

## 2024-02-29 DIAGNOSIS — R78.81 BACTEREMIA DUE TO ENTEROCOCCUS: Primary | ICD-10-CM

## 2024-02-29 DIAGNOSIS — R78.81 BACTEREMIA DUE TO STAPHYLOCOCCUS: ICD-10-CM

## 2024-02-29 DIAGNOSIS — B95.2 BACTEREMIA DUE TO ENTEROCOCCUS: Primary | ICD-10-CM

## 2024-02-29 DIAGNOSIS — B95.8 BACTEREMIA DUE TO STAPHYLOCOCCUS: ICD-10-CM

## 2024-02-29 PROCEDURE — 96365 THER/PROPH/DIAG IV INF INIT: CPT

## 2024-02-29 RX ORDER — SODIUM CHLORIDE 9 MG/ML
20 INJECTION, SOLUTION INTRAVENOUS ONCE
Status: COMPLETED | OUTPATIENT
Start: 2024-02-29 | End: 2024-02-29

## 2024-02-29 RX ORDER — SODIUM CHLORIDE 9 MG/ML
20 INJECTION, SOLUTION INTRAVENOUS ONCE
Status: CANCELLED | OUTPATIENT
Start: 2024-03-01

## 2024-02-29 RX ADMIN — DAPTOMYCIN 500 MG: 500 INJECTION, POWDER, LYOPHILIZED, FOR SOLUTION INTRAVENOUS at 15:43

## 2024-02-29 RX ADMIN — SODIUM CHLORIDE 20 ML/HR: 0.9 INJECTION, SOLUTION INTRAVENOUS at 15:43

## 2024-02-29 NOTE — PROGRESS NOTES
Patient tolerated treatment today without complications. 7 day central line dressing changed. Patient confirmed appointment for last treatment tomorrow at 2pm. Print out declined.

## 2024-03-01 ENCOUNTER — HOSPITAL ENCOUNTER (OUTPATIENT)
Dept: INFUSION CENTER | Facility: CLINIC | Age: 79
End: 2024-03-01
Payer: MEDICARE

## 2024-03-01 VITALS
OXYGEN SATURATION: 98 % | HEART RATE: 65 BPM | SYSTOLIC BLOOD PRESSURE: 126 MMHG | DIASTOLIC BLOOD PRESSURE: 76 MMHG | TEMPERATURE: 97.3 F | RESPIRATION RATE: 18 BRPM

## 2024-03-01 DIAGNOSIS — B95.2 BACTEREMIA DUE TO ENTEROCOCCUS: Primary | ICD-10-CM

## 2024-03-01 DIAGNOSIS — B95.8 BACTEREMIA DUE TO STAPHYLOCOCCUS: ICD-10-CM

## 2024-03-01 DIAGNOSIS — R78.81 BACTEREMIA DUE TO ENTEROCOCCUS: Primary | ICD-10-CM

## 2024-03-01 DIAGNOSIS — R78.81 BACTEREMIA DUE TO STAPHYLOCOCCUS: ICD-10-CM

## 2024-03-01 LAB
ALBUMIN SERPL BCP-MCNC: 4.1 G/DL (ref 3.5–5)
ALP SERPL-CCNC: 50 U/L (ref 34–104)
ALT SERPL W P-5'-P-CCNC: 15 U/L (ref 7–52)
ANION GAP SERPL CALCULATED.3IONS-SCNC: 7 MMOL/L
AST SERPL W P-5'-P-CCNC: 13 U/L (ref 13–39)
BASOPHILS # BLD AUTO: 0.06 THOUSANDS/ÂΜL (ref 0–0.1)
BASOPHILS NFR BLD AUTO: 1 % (ref 0–1)
BILIRUB SERPL-MCNC: 0.47 MG/DL (ref 0.2–1)
BUN SERPL-MCNC: 26 MG/DL (ref 5–25)
CALCIUM SERPL-MCNC: 9.1 MG/DL (ref 8.4–10.2)
CHLORIDE SERPL-SCNC: 105 MMOL/L (ref 96–108)
CK SERPL-CCNC: 34 U/L (ref 39–308)
CO2 SERPL-SCNC: 26 MMOL/L (ref 21–32)
CREAT SERPL-MCNC: 2.06 MG/DL (ref 0.6–1.3)
EOSINOPHIL # BLD AUTO: 0.48 THOUSAND/ÂΜL (ref 0–0.61)
EOSINOPHIL NFR BLD AUTO: 7 % (ref 0–6)
ERYTHROCYTE [DISTWIDTH] IN BLOOD BY AUTOMATED COUNT: 13 % (ref 11.6–15.1)
GFR SERPL CREATININE-BSD FRML MDRD: 29 ML/MIN/1.73SQ M
GLUCOSE SERPL-MCNC: 125 MG/DL (ref 65–140)
HCT VFR BLD AUTO: 36.9 % (ref 36.5–49.3)
HGB BLD-MCNC: 11.9 G/DL (ref 12–17)
IMM GRANULOCYTES # BLD AUTO: 0.03 THOUSAND/UL (ref 0–0.2)
IMM GRANULOCYTES NFR BLD AUTO: 1 % (ref 0–2)
LYMPHOCYTES # BLD AUTO: 1.91 THOUSANDS/ÂΜL (ref 0.6–4.47)
LYMPHOCYTES NFR BLD AUTO: 29 % (ref 14–44)
MCH RBC QN AUTO: 30.5 PG (ref 26.8–34.3)
MCHC RBC AUTO-ENTMCNC: 32.2 G/DL (ref 31.4–37.4)
MCV RBC AUTO: 95 FL (ref 82–98)
MONOCYTES # BLD AUTO: 0.49 THOUSAND/ÂΜL (ref 0.17–1.22)
MONOCYTES NFR BLD AUTO: 7 % (ref 4–12)
NEUTROPHILS # BLD AUTO: 3.61 THOUSANDS/ÂΜL (ref 1.85–7.62)
NEUTS SEG NFR BLD AUTO: 55 % (ref 43–75)
NRBC BLD AUTO-RTO: 0 /100 WBCS
PLATELET # BLD AUTO: 213 THOUSANDS/UL (ref 149–390)
PMV BLD AUTO: 9.1 FL (ref 8.9–12.7)
POTASSIUM SERPL-SCNC: 4 MMOL/L (ref 3.5–5.3)
PROT SERPL-MCNC: 7.3 G/DL (ref 6.4–8.4)
RBC # BLD AUTO: 3.9 MILLION/UL (ref 3.88–5.62)
SODIUM SERPL-SCNC: 138 MMOL/L (ref 135–147)
WBC # BLD AUTO: 6.58 THOUSAND/UL (ref 4.31–10.16)

## 2024-03-01 PROCEDURE — 96365 THER/PROPH/DIAG IV INF INIT: CPT

## 2024-03-01 PROCEDURE — 85025 COMPLETE CBC W/AUTO DIFF WBC: CPT | Performed by: INTERNAL MEDICINE

## 2024-03-01 PROCEDURE — 80053 COMPREHEN METABOLIC PANEL: CPT | Performed by: INTERNAL MEDICINE

## 2024-03-01 PROCEDURE — 82550 ASSAY OF CK (CPK): CPT | Performed by: INTERNAL MEDICINE

## 2024-03-01 RX ORDER — SODIUM CHLORIDE 9 MG/ML
20 INJECTION, SOLUTION INTRAVENOUS ONCE
Status: COMPLETED | OUTPATIENT
Start: 2024-03-01 | End: 2024-03-01

## 2024-03-01 RX ORDER — SODIUM CHLORIDE 9 MG/ML
20 INJECTION, SOLUTION INTRAVENOUS ONCE
Status: CANCELLED | OUTPATIENT
Start: 2024-03-01

## 2024-03-01 RX ADMIN — DAPTOMYCIN 500 MG: 500 INJECTION, POWDER, LYOPHILIZED, FOR SOLUTION INTRAVENOUS at 14:02

## 2024-03-01 RX ADMIN — SODIUM CHLORIDE 20 ML/HR: 0.9 INJECTION, SOLUTION INTRAVENOUS at 13:58

## 2024-03-01 NOTE — PROGRESS NOTES
Patient tolerated his treatment without any adverse reactions. Interventional Radiology removed patient's central line at bedside. Today was patient's last ordered dose. Patient declined AVS.

## 2024-03-01 NOTE — PROGRESS NOTES
Patient is here for his last day of antibiotics. He offers no complaints at this time. Labs drawn per 's orders

## 2024-04-05 ENCOUNTER — APPOINTMENT (OUTPATIENT)
Dept: LAB | Facility: CLINIC | Age: 79
End: 2024-04-05
Payer: MEDICARE

## 2024-04-05 DIAGNOSIS — E78.49 OTHER HYPERLIPIDEMIA: ICD-10-CM

## 2024-04-05 LAB
BASOPHILS # BLD AUTO: 0.04 THOUSANDS/ÂΜL (ref 0–0.1)
BASOPHILS NFR BLD AUTO: 1 % (ref 0–1)
EOSINOPHIL # BLD AUTO: 0.53 THOUSAND/ÂΜL (ref 0–0.61)
EOSINOPHIL NFR BLD AUTO: 9 % (ref 0–6)
ERYTHROCYTE [DISTWIDTH] IN BLOOD BY AUTOMATED COUNT: 13.2 % (ref 11.6–15.1)
HCT VFR BLD AUTO: 40.4 % (ref 36.5–49.3)
HGB BLD-MCNC: 12.8 G/DL (ref 12–17)
IMM GRANULOCYTES # BLD AUTO: 0.02 THOUSAND/UL (ref 0–0.2)
IMM GRANULOCYTES NFR BLD AUTO: 0 % (ref 0–2)
LYMPHOCYTES # BLD AUTO: 1.78 THOUSANDS/ÂΜL (ref 0.6–4.47)
LYMPHOCYTES NFR BLD AUTO: 28 % (ref 14–44)
MCH RBC QN AUTO: 30.4 PG (ref 26.8–34.3)
MCHC RBC AUTO-ENTMCNC: 31.7 G/DL (ref 31.4–37.4)
MCV RBC AUTO: 96 FL (ref 82–98)
MONOCYTES # BLD AUTO: 0.45 THOUSAND/ÂΜL (ref 0.17–1.22)
MONOCYTES NFR BLD AUTO: 7 % (ref 4–12)
NEUTROPHILS # BLD AUTO: 3.45 THOUSANDS/ÂΜL (ref 1.85–7.62)
NEUTS SEG NFR BLD AUTO: 55 % (ref 43–75)
NRBC BLD AUTO-RTO: 0 /100 WBCS
PLATELET # BLD AUTO: 110 THOUSANDS/UL (ref 149–390)
PMV BLD AUTO: 11.1 FL (ref 8.9–12.7)
RBC # BLD AUTO: 4.21 MILLION/UL (ref 3.88–5.62)
T4 FREE SERPL-MCNC: 0.61 NG/DL (ref 0.61–1.12)
T4 SERPL-MCNC: 8.79 UG/DL (ref 6.09–12.23)
TSH SERPL DL<=0.05 MIU/L-ACNC: 2.02 UIU/ML (ref 0.45–4.5)
WBC # BLD AUTO: 6.27 THOUSAND/UL (ref 4.31–10.16)

## 2024-04-05 PROCEDURE — 85025 COMPLETE CBC W/AUTO DIFF WBC: CPT

## 2024-04-05 PROCEDURE — 80061 LIPID PANEL: CPT

## 2024-04-05 PROCEDURE — 84436 ASSAY OF TOTAL THYROXINE: CPT

## 2024-04-05 PROCEDURE — 84443 ASSAY THYROID STIM HORMONE: CPT

## 2024-04-05 PROCEDURE — 84439 ASSAY OF FREE THYROXINE: CPT

## 2024-04-05 PROCEDURE — 84479 ASSAY OF THYROID (T3 OR T4): CPT

## 2024-04-05 PROCEDURE — 80053 COMPREHEN METABOLIC PANEL: CPT

## 2024-04-05 PROCEDURE — 36415 COLL VENOUS BLD VENIPUNCTURE: CPT

## 2024-04-06 LAB
ALBUMIN SERPL BCP-MCNC: 4.2 G/DL (ref 3.5–5)
ALP SERPL-CCNC: 56 U/L (ref 34–104)
ALT SERPL W P-5'-P-CCNC: 11 U/L (ref 7–52)
ANION GAP SERPL CALCULATED.3IONS-SCNC: 10 MMOL/L (ref 4–13)
AST SERPL W P-5'-P-CCNC: 18 U/L (ref 13–39)
BILIRUB SERPL-MCNC: 0.6 MG/DL (ref 0.2–1)
BUN SERPL-MCNC: 30 MG/DL (ref 5–25)
CALCIUM SERPL-MCNC: 9 MG/DL (ref 8.4–10.2)
CHLORIDE SERPL-SCNC: 104 MMOL/L (ref 96–108)
CHOLEST SERPL-MCNC: 186 MG/DL
CO2 SERPL-SCNC: 26 MMOL/L (ref 21–32)
CREAT SERPL-MCNC: 2.23 MG/DL (ref 0.6–1.3)
GFR SERPL CREATININE-BSD FRML MDRD: 26 ML/MIN/1.73SQ M
GLUCOSE P FAST SERPL-MCNC: 91 MG/DL (ref 65–99)
HDLC SERPL-MCNC: 38 MG/DL
LDLC SERPL CALC-MCNC: 109 MG/DL (ref 0–100)
NONHDLC SERPL-MCNC: 148 MG/DL
POTASSIUM SERPL-SCNC: 4.1 MMOL/L (ref 3.5–5.3)
PROT SERPL-MCNC: 7.3 G/DL (ref 6.4–8.4)
SODIUM SERPL-SCNC: 140 MMOL/L (ref 135–147)
TRIGL SERPL-MCNC: 193 MG/DL

## 2024-04-07 LAB — T3RU NFR SERPL: 25 % (ref 24–39)

## 2024-04-09 ENCOUNTER — OFFICE VISIT (OUTPATIENT)
Dept: CARDIOLOGY CLINIC | Facility: CLINIC | Age: 79
End: 2024-04-09
Payer: MEDICARE

## 2024-04-09 VITALS
BODY MASS INDEX: 27.25 KG/M2 | HEIGHT: 69 IN | HEART RATE: 68 BPM | SYSTOLIC BLOOD PRESSURE: 126 MMHG | WEIGHT: 184 LBS | DIASTOLIC BLOOD PRESSURE: 70 MMHG

## 2024-04-09 DIAGNOSIS — N18.30 BENIGN HYPERTENSION WITH CKD (CHRONIC KIDNEY DISEASE) STAGE III (HCC): ICD-10-CM

## 2024-04-09 DIAGNOSIS — I47.10 PSVT (PAROXYSMAL SUPRAVENTRICULAR TACHYCARDIA): ICD-10-CM

## 2024-04-09 DIAGNOSIS — I48.0 PAROXYSMAL ATRIAL FIBRILLATION (HCC): Primary | ICD-10-CM

## 2024-04-09 DIAGNOSIS — I12.9 BENIGN HYPERTENSION WITH CKD (CHRONIC KIDNEY DISEASE) STAGE III (HCC): ICD-10-CM

## 2024-04-09 DIAGNOSIS — N18.32 STAGE 3B CHRONIC KIDNEY DISEASE (HCC): ICD-10-CM

## 2024-04-09 DIAGNOSIS — E66.3 OVERWEIGHT (BMI 25.0-29.9): ICD-10-CM

## 2024-04-09 PROCEDURE — 99214 OFFICE O/P EST MOD 30 MIN: CPT | Performed by: INTERNAL MEDICINE

## 2024-04-09 NOTE — PROGRESS NOTES
[facility] [place] [id number] [facility] [address] [phone]                                                Cardiology Office Follow up  Mathew Sloan Jr., 78 y.o. male  YOB: 1945  MRN: [id number] Encounter: [id number]      PCP - Nick Haskins, DO    Assessment  Paroxysmal Atrial Fibrillation   Paroxysmal SVT  Zio-patch - 9/2021 - 40 short SVT episodes, upto 22 beats (12 seconds), no Afib  PACs, Irregular heart beat  Hypertension  CKD  Creatinine remains stable around 2  Overweight, Body mass index is 27.17 kg/m².     Plan  Paroxysmal Atrial Fibrillation, Frequent PACs, PSVT  Overview  5/2021 - had atrial fibrillation for 3-4 hours on holter monitor, asymptomatic, feels it was after his Moderna vaccine  tried metoprolol but was too fatigued and as a result stopped  8/27/21 - had tachycardia noted on fitbit persistently for several hours, and went to the ED after taking metoprolol and aspirin  Initial ED ECG was NSR  He has started back on metoprolol succinate 25 mg b.i.d. since then, and has been otherwise feeling well  9/2021 - Zio patch  no further AFib, but frequent short, asymptomatic SVT episodes - upto 12 seconds  He was switched back from Xarelto to aspirin after this  2/2023 - he continues to do well and has not had any further episodes of atrial fibrillation over the last 2 years, although does occasionally have brief tachycardia   8/2023: Remains free of any major palpitations, and has overall been doing well without any major limitations.  He does have the cardiac monitor at home but continues to sit in a box and he has not used it at all  4/2024: no major palpitations.  No A-fib during his UTI hospitalization recently.  Impression  No recurrent Afib episodes, well controlled PAT/PVC  Plan  Continue current therapy with metoprolol succinate 25 mg twice daily, aspirin 81 mg daily     Hypertension with  CKD  Reasonably well-controlled, 126/70 today  Creatinine remains stable at 2-2.3  Continue metoprolol 25 mg twice daily     Hyperlipidemia, overweight - Body mass index is 27.17 kg/m².     Cholesterol levels reasonably controlled, triglycerides still mildly elevated  Dietary modifications  Monitor     ECG today -  No results found for this visit on 04/09/24.       No orders of the defined types were placed in this encounter.    Return in about 6 months (around 10/9/2024), or if symptoms worsen or fail to improve.      History of Present Illness   78 y.o. male comes in as a new patient for evaluation of irregular heartbeat.    He is in his usual state of health, and does not report any major symptoms of chest pain, palpitations, shortness of breath.  He recently received his COVID vaccination, and after this was monitoring his pulse ox, and at this time he incidentally noted some irregular heartbeat.  He continued to have some irregular heartbeats, and as a result went to the ED for evaluation.  In the ED, he was noted to have sinus rhythm with PACs on ECG, and was otherwise monitored overnight and discharged.    Since discharge, he continues to be symptom free and has not had any major episodes of heart racing or palpitations.  He states that his heart rate goes up to 120 with activity.  No history of dizziness, near-syncope or syncope.      Interval history - 8/30/2021  He comes back for follow-up after about 2 months.  He had otherwise been feeling well since discontinuing his metoprolol, with improvement in his fatigue.  As a result, he wanted to hold off on any testing, and see if xarelto also could be stopped.  But per my discussion with him through my chart messages, I had asked him to continue Xarelto as his AFib episodes have been previously asymptomatic, while using metoprolol p.r.n..  Unfortunately, on 08/27/2021, he again has had tachycardia noted on his Fitbit, which persisted for several hours, and as  a result he decided to go to the ED.  By the time EMS arrived, he had already taken aspirin and metoprolol, and with this is heart rate seem to have improved.   He was observed in the ED for 3 hours, and was discharged to home with resumption of metoprolol at prior doses.   no further complains of dizziness or is severe fatigue since then.  No complains of dizziness or lightheadedness.    Interval history - 11/11/2021  He comes back for follow-up after about 3 months.  He completed the 2 week Zio patch monitor, and did not have any atrial fibrillation episodes on the same.  He continues to feel well otherwise.  No complains of chest pain, shortness of breath.    Interval history - 2/14/2022  He comes back for follow-up after about 3 months.  He has not had any further symptoms of persistent palpitations.  No complains of dizziness or lightheadedness, near-syncope or syncope.  He reports significant anorexia over the past few months, and has lost about 7 lb    Interval history - 8/30/2022  He comes back for follow-up after about 6 months.  He has overall been doing well and has not had any major recurrences of palpitations.  He does continue to monitor his heart rate with a Fitbit.  He was more active doing 6000 steps/day until a couple of weeks ago    Interval history - 10/19/2022  He comes back for follow-up after about 2 months.  He is here for an earlier visit due to concerns about his heart rate going quite slow at night, into the mid 40s on his Fitbit.  No other symptoms of dizziness or lightheadedness, near-syncope or syncope.  No symptoms of palpitations, or any persistent tachycardia.    Interval history - 2/15/2023  He comes back for follow-up after about 4 months.  Is been doing well overall and has not had any major issues with chest pain, shortness of breath, or palpitations.  He continues to monitor himself on his Fitbit and his heart rate remains in the 60s to 70s on average.  No complaints of  dizziness or lightness, near-syncope.  He remains reasonably active.    Interval history - 8/1/2023  He comes back for follow-up after about 6 months.  He continues to do well and has not had any issues with chest pain or shortness of breath, palpitations or dizziness.  He remains ambulatory, but has a right foot weakness with slight foot drop limiting his ambulation.  He has been unwilling to do anything regarding the same as well.    Interval history - 4/9/2024  He returns for 6 monthly follow up. In 2/2024, he was in the hospital with a complicated UTI, including symptoms of hematuria.  He was treated with IV antibiotics for 2 weeks, and has subsequently had improvement thereafter.  He reports not having had any further bleeding thereafter.  He was supposed to follow-up with urologist regarding this hematuria, but he has decided to not see them as he has not had further problems.       Historical Information   Past Medical History:   Diagnosis Date   • Asthma    • CKD (chronic kidney disease)    • Hypercholesteremia    • Hypertension    • Paroxysmal A-fib (HCC)    • Paroxysmal SVT (supraventricular tachycardia)      Past Surgical History:   Procedure Laterality Date   • CYST REMOVAL     • IR TUNNELED CENTRAL LINE PLACEMENT  2/21/2024   • TONSILLECTOMY       Family History   Problem Relation Age of Onset   • Diabetes Mother    • No Known Problems Father      Current Outpatient Medications on File Prior to Visit   Medication Sig Dispense Refill   • Apoaequorin (PREVAGEN PO) Take by mouth in the morning     • aspirin 81 mg chewable tablet Chew 81 mg every 12 (twelve) hours     • Cholecalciferol (Vitamin D-3) 25 MCG (1000 UT) CAPS Take by mouth     • guaiFENesin (MUCINEX) 600 mg 12 hr tablet Take 1,200 mg by mouth daily     • metoprolol succinate (TOPROL-XL) 25 mg 24 hr tablet Take 1 tablet (25 mg total) by mouth every 12 (twelve) hours 180 tablet 3   • multivitamin (THERAGRAN) TABS Take 1 tablet by mouth daily     •  "omeprazole (PriLOSEC) 10 mg delayed release capsule Take 10 mg by mouth daily       No current facility-administered medications on file prior to visit.     Allergies   Allergen Reactions   • Chlorpheniramine Other (See Comments)   • Phenylephrine Other (See Comments)     Social History     Socioeconomic History   • Marital status: /Civil Union     Spouse name: None   • Number of children: None   • Years of education: None   • Highest education level: None   Occupational History   • None   Tobacco Use   • Smoking status: Never   • Smokeless tobacco: Never   Vaping Use   • Vaping status: Never Used   Substance and Sexual Activity   • Alcohol use: Yes     Comment: social   • Drug use: Never   • Sexual activity: Never   Other Topics Concern   • None   Social History Narrative   • None     Social Determinants of Health     Financial Resource Strain: Not on file   Food Insecurity: No Food Insecurity (2/19/2024)    Hunger Vital Sign    • Worried About Running Out of Food in the Last Year: Never true    • Ran Out of Food in the Last Year: Never true   Transportation Needs: No Transportation Needs (2/19/2024)    PRAPARE - Transportation    • Lack of Transportation (Medical): No    • Lack of Transportation (Non-Medical): No   Physical Activity: Not on file   Stress: Not on file   Social Connections: Not on file   Intimate Partner Violence: Not on file   Housing Stability: Low Risk  (2/19/2024)    Housing Stability Vital Sign    • Unable to Pay for Housing in the Last Year: No    • Number of Places Lived in the Last Year: 1    • Unstable Housing in the Last Year: No        Review of Systems   All other systems reviewed and are negative.      Vitals:  Vitals:    04/09/24 1246   BP: 126/70   Pulse: 68   Weight: 83.5 kg (184 lb)   Height: 5' 9\" (1.753 m)     BMI - Body mass index is 27.17 kg/m².  Wt Readings from Last 7 Encounters:   04/09/24 83.5 kg (184 lb)   02/18/24 85.3 kg (188 lb)   08/01/23 85.3 kg (188 lb) "   02/15/23 86.6 kg (191 lb)   10/19/22 83.9 kg (185 lb)   08/30/22 86.6 kg (191 lb)   02/14/22 85.1 kg (187 lb 9.6 oz)       Physical Exam  Vitals and nursing note reviewed.   Constitutional:       General: He is not in acute distress.     Appearance: Normal appearance. He is well-developed. He is not ill-appearing or diaphoretic.   HENT:      Head: Normocephalic and atraumatic.      Nose: No congestion.   Eyes:      General: No scleral icterus.     Conjunctiva/sclera: Conjunctivae normal.   Neck:      Vascular: No carotid bruit or JVD.   Cardiovascular:      Rate and Rhythm: Normal rate and regular rhythm.      Heart sounds: Normal heart sounds. No murmur heard.     No friction rub. No gallop.   Pulmonary:      Effort: Pulmonary effort is normal. No respiratory distress.      Breath sounds: Normal breath sounds. No wheezing or rales.   Chest:      Chest wall: No tenderness.   Abdominal:      General: There is no distension.      Palpations: Abdomen is soft.      Tenderness: There is no abdominal tenderness.   Musculoskeletal:         General: No swelling, tenderness or deformity.      Cervical back: Neck supple. No muscular tenderness.      Right lower leg: No edema.      Left lower leg: No edema.   Skin:     General: Skin is warm.   Neurological:      General: No focal deficit present.      Mental Status: He is alert and oriented to person, place, and time. Mental status is at baseline.   Psychiatric:         Mood and Affect: Mood normal.         Behavior: Behavior normal.         Thought Content: Thought content normal.         Labs:  CBC:   Lab Results   Component Value Date    WBC 6.27 04/05/2024    RBC 4.21 04/05/2024    HGB 12.8 04/05/2024    HCT 40.4 04/05/2024    MCV 96 04/05/2024     (L) 04/05/2024    RDW 13.2 04/05/2024       CMP:   Lab Results   Component Value Date    K 4.1 04/05/2024     04/05/2024    CO2 26 04/05/2024    BUN 30 (H) 04/05/2024    CREATININE 2.23 (H) 04/05/2024    EGFR 26  "04/05/2024    CALCIUM 9.0 04/05/2024    AST 18 04/05/2024    ALT 11 04/05/2024    ALKPHOS 56 04/05/2024       Magnesium:  No results found for: \"MG\"    Lipid Profile:   Lab Results   Component Value Date    HDL 38 (L) 04/05/2024    TRIG 193 (H) 04/05/2024    LDLCALC 109 (H) 04/05/2024       Thyroid Studies:   Lab Results   Component Value Date    IEE3EYZXNDVF 2.019 04/05/2024    FREET4 0.61 04/05/2024    F8TKTOC 8.79 04/05/2024       No components found for: \"HGA1C\"    Lab Results   Component Value Date    INR 0.98 02/21/2024    INR 1.01 08/27/2021   5    Imaging: No results found.    Cardiac testing:   No results found for this or any previous visit.  No results found for this or any previous visit.  No results found for this or any previous visit.  No results found for this or any previous visit.    "

## 2024-07-27 ENCOUNTER — HOSPITAL ENCOUNTER (INPATIENT)
Facility: HOSPITAL | Age: 79
LOS: 2 days | Discharge: HOME HEALTH | DRG: 728 | End: 2024-07-29
Attending: EMERGENCY MEDICINE | Admitting: INTERNAL MEDICINE
Payer: MEDICARE

## 2024-07-27 DIAGNOSIS — N39.0 UTI (URINARY TRACT INFECTION): ICD-10-CM

## 2024-07-27 DIAGNOSIS — R33.9 URINARY RETENTION: ICD-10-CM

## 2024-07-27 DIAGNOSIS — Z93.59 SUPRAPUBIC CATHETER (HCC): ICD-10-CM

## 2024-07-27 DIAGNOSIS — N48.1 BALANITIS: Primary | ICD-10-CM

## 2024-07-27 LAB
ALBUMIN SERPL BCG-MCNC: 4.2 G/DL (ref 3.5–5)
ALP SERPL-CCNC: 55 U/L (ref 34–104)
ALT SERPL W P-5'-P-CCNC: 9 U/L (ref 7–52)
ANION GAP SERPL CALCULATED.3IONS-SCNC: 7 MMOL/L (ref 4–13)
AST SERPL W P-5'-P-CCNC: 17 U/L (ref 13–39)
BACTERIA UR QL AUTO: ABNORMAL /HPF
BASOPHILS # BLD AUTO: 0.05 THOUSANDS/ÂΜL (ref 0–0.1)
BASOPHILS NFR BLD AUTO: 1 % (ref 0–1)
BILIRUB SERPL-MCNC: 0.45 MG/DL (ref 0.2–1)
BILIRUB UR QL STRIP: NEGATIVE
BUN SERPL-MCNC: 27 MG/DL (ref 5–25)
CALCIUM SERPL-MCNC: 9.2 MG/DL (ref 8.4–10.2)
CHLORIDE SERPL-SCNC: 105 MMOL/L (ref 96–108)
CLARITY UR: CLEAR
CO2 SERPL-SCNC: 25 MMOL/L (ref 21–32)
COLOR UR: ABNORMAL
CREAT SERPL-MCNC: 2.32 MG/DL (ref 0.6–1.3)
EOSINOPHIL # BLD AUTO: 0.41 THOUSAND/ÂΜL (ref 0–0.61)
EOSINOPHIL NFR BLD AUTO: 5 % (ref 0–6)
ERYTHROCYTE [DISTWIDTH] IN BLOOD BY AUTOMATED COUNT: 13.1 % (ref 11.6–15.1)
GFR SERPL CREATININE-BSD FRML MDRD: 25 ML/MIN/1.73SQ M
GLUCOSE SERPL-MCNC: 136 MG/DL (ref 65–140)
GLUCOSE UR STRIP-MCNC: ABNORMAL MG/DL
HCT VFR BLD AUTO: 37.6 % (ref 36.5–49.3)
HGB BLD-MCNC: 12.1 G/DL (ref 12–17)
HGB UR QL STRIP.AUTO: NEGATIVE
IMM GRANULOCYTES # BLD AUTO: 0.04 THOUSAND/UL (ref 0–0.2)
IMM GRANULOCYTES NFR BLD AUTO: 0 % (ref 0–2)
KETONES UR STRIP-MCNC: NEGATIVE MG/DL
LEUKOCYTE ESTERASE UR QL STRIP: ABNORMAL
LYMPHOCYTES # BLD AUTO: 1.49 THOUSANDS/ÂΜL (ref 0.6–4.47)
LYMPHOCYTES NFR BLD AUTO: 17 % (ref 14–44)
MCH RBC QN AUTO: 30.6 PG (ref 26.8–34.3)
MCHC RBC AUTO-ENTMCNC: 32.2 G/DL (ref 31.4–37.4)
MCV RBC AUTO: 95 FL (ref 82–98)
MONOCYTES # BLD AUTO: 0.64 THOUSAND/ÂΜL (ref 0.17–1.22)
MONOCYTES NFR BLD AUTO: 7 % (ref 4–12)
NEUTROPHILS # BLD AUTO: 6.38 THOUSANDS/ÂΜL (ref 1.85–7.62)
NEUTS SEG NFR BLD AUTO: 70 % (ref 43–75)
NITRITE UR QL STRIP: POSITIVE
NON-SQ EPI CELLS URNS QL MICRO: ABNORMAL /HPF
NRBC BLD AUTO-RTO: 0 /100 WBCS
PH UR STRIP.AUTO: 6.5 [PH]
PLATELET # BLD AUTO: 161 THOUSANDS/UL (ref 149–390)
PMV BLD AUTO: 9.7 FL (ref 8.9–12.7)
POTASSIUM SERPL-SCNC: 4.1 MMOL/L (ref 3.5–5.3)
PROT SERPL-MCNC: 7.2 G/DL (ref 6.4–8.4)
PROT UR STRIP-MCNC: ABNORMAL MG/DL
RBC # BLD AUTO: 3.96 MILLION/UL (ref 3.88–5.62)
RBC #/AREA URNS AUTO: ABNORMAL /HPF
SODIUM SERPL-SCNC: 137 MMOL/L (ref 135–147)
SP GR UR STRIP.AUTO: 1.02 (ref 1–1.03)
UROBILINOGEN UR STRIP-ACNC: <2 MG/DL
WBC # BLD AUTO: 9.01 THOUSAND/UL (ref 4.31–10.16)
WBC #/AREA URNS AUTO: ABNORMAL /HPF

## 2024-07-27 PROCEDURE — 99285 EMERGENCY DEPT VISIT HI MDM: CPT

## 2024-07-27 PROCEDURE — 36415 COLL VENOUS BLD VENIPUNCTURE: CPT

## 2024-07-27 PROCEDURE — 81001 URINALYSIS AUTO W/SCOPE: CPT

## 2024-07-27 PROCEDURE — 87086 URINE CULTURE/COLONY COUNT: CPT

## 2024-07-27 PROCEDURE — 85025 COMPLETE CBC W/AUTO DIFF WBC: CPT

## 2024-07-27 PROCEDURE — 1123F ACP DISCUSS/DSCN MKR DOCD: CPT

## 2024-07-27 PROCEDURE — 99284 EMERGENCY DEPT VISIT MOD MDM: CPT

## 2024-07-27 PROCEDURE — 87186 SC STD MICRODIL/AGAR DIL: CPT

## 2024-07-27 PROCEDURE — 80053 COMPREHEN METABOLIC PANEL: CPT

## 2024-07-27 PROCEDURE — NC001 PR NO CHARGE: Performed by: RADIOLOGY

## 2024-07-27 PROCEDURE — 99223 1ST HOSP IP/OBS HIGH 75: CPT | Performed by: INTERNAL MEDICINE

## 2024-07-27 PROCEDURE — 99222 1ST HOSP IP/OBS MODERATE 55: CPT | Performed by: UROLOGY

## 2024-07-27 PROCEDURE — 96375 TX/PRO/DX INJ NEW DRUG ADDON: CPT

## 2024-07-27 PROCEDURE — 96374 THER/PROPH/DIAG INJ IV PUSH: CPT

## 2024-07-27 RX ORDER — LIDOCAINE HYDROCHLORIDE 20 MG/ML
1 JELLY TOPICAL ONCE
Status: COMPLETED | OUTPATIENT
Start: 2024-07-27 | End: 2024-07-27

## 2024-07-27 RX ORDER — METOPROLOL SUCCINATE 25 MG/1
25 TABLET, EXTENDED RELEASE ORAL EVERY 12 HOURS
Status: DISCONTINUED | OUTPATIENT
Start: 2024-07-27 | End: 2024-07-29 | Stop reason: HOSPADM

## 2024-07-27 RX ORDER — PANTOPRAZOLE SODIUM 20 MG/1
20 TABLET, DELAYED RELEASE ORAL
Status: DISCONTINUED | OUTPATIENT
Start: 2024-07-27 | End: 2024-07-29 | Stop reason: HOSPADM

## 2024-07-27 RX ORDER — HEPARIN SODIUM 5000 [USP'U]/ML
5000 INJECTION, SOLUTION INTRAVENOUS; SUBCUTANEOUS EVERY 8 HOURS SCHEDULED
OUTPATIENT
Start: 2024-07-27

## 2024-07-27 RX ORDER — FENTANYL CITRATE 50 UG/ML
25 INJECTION, SOLUTION INTRAMUSCULAR; INTRAVENOUS ONCE
Status: COMPLETED | OUTPATIENT
Start: 2024-07-27 | End: 2024-07-27

## 2024-07-27 RX ORDER — HEPARIN SODIUM 5000 [USP'U]/ML
5000 INJECTION, SOLUTION INTRAVENOUS; SUBCUTANEOUS EVERY 8 HOURS SCHEDULED
Status: DISCONTINUED | OUTPATIENT
Start: 2024-07-27 | End: 2024-07-29 | Stop reason: HOSPADM

## 2024-07-27 RX ORDER — ASPIRIN 81 MG/1
81 TABLET, CHEWABLE ORAL EVERY 12 HOURS
Status: DISCONTINUED | OUTPATIENT
Start: 2024-07-27 | End: 2024-07-27

## 2024-07-27 RX ORDER — GUAIFENESIN 600 MG/1
1200 TABLET, EXTENDED RELEASE ORAL DAILY
Status: DISCONTINUED | OUTPATIENT
Start: 2024-07-27 | End: 2024-07-29 | Stop reason: HOSPADM

## 2024-07-27 RX ORDER — ACETAMINOPHEN 325 MG/1
650 TABLET ORAL EVERY 6 HOURS PRN
Status: DISCONTINUED | OUTPATIENT
Start: 2024-07-27 | End: 2024-07-29 | Stop reason: HOSPADM

## 2024-07-27 RX ADMIN — METOPROLOL SUCCINATE 25 MG: 25 TABLET, EXTENDED RELEASE ORAL at 06:53

## 2024-07-27 RX ADMIN — GUAIFENESIN 1200 MG: 600 TABLET ORAL at 08:48

## 2024-07-27 RX ADMIN — METOPROLOL SUCCINATE 25 MG: 25 TABLET, EXTENDED RELEASE ORAL at 17:30

## 2024-07-27 RX ADMIN — B-COMPLEX W/ C & FOLIC ACID TAB 1 TABLET: TAB at 08:48

## 2024-07-27 RX ADMIN — PANTOPRAZOLE SODIUM 20 MG: 20 TABLET, DELAYED RELEASE ORAL at 06:53

## 2024-07-27 RX ADMIN — HEPARIN SODIUM 5000 UNITS: 5000 INJECTION INTRAVENOUS; SUBCUTANEOUS at 21:40

## 2024-07-27 RX ADMIN — ASPIRIN 81 MG 81 MG: 81 TABLET ORAL at 06:53

## 2024-07-27 RX ADMIN — CEFTRIAXONE SODIUM 1000 MG: 10 INJECTION, POWDER, FOR SOLUTION INTRAVENOUS at 05:56

## 2024-07-27 RX ADMIN — FENTANYL CITRATE 25 MCG: 50 INJECTION INTRAMUSCULAR; INTRAVENOUS at 05:19

## 2024-07-27 RX ADMIN — LIDOCAINE HYDROCHLORIDE 1 APPLICATION: 20 JELLY TOPICAL at 04:22

## 2024-07-27 RX ADMIN — HEPARIN SODIUM 5000 UNITS: 5000 INJECTION INTRAVENOUS; SUBCUTANEOUS at 06:53

## 2024-07-27 RX ADMIN — HEPARIN SODIUM 5000 UNITS: 5000 INJECTION INTRAVENOUS; SUBCUTANEOUS at 13:34

## 2024-07-27 NOTE — CONSULTS
Consult - Urology   Mathew Sloan Jr. 1945, 79 y.o. male MRN: [id number]    Unit/Bed#: S -01 Encounter: [id number]      Urinary retention  Assessment & Plan  Acute urinary retention  Meatal stenosis  Bullous blistering rash of the glans no rash elsewhere on the body; does not seem typical of balanitis; no recent illness/exposure to explain a dermatitis. he is being treated with IV abx while awaiting urine cultures as well  Unable to pass a reliable catheter due to meatal stenosis  currently a 6Fr catheter is in place he drains intermittently through and around that, 400ml out  currently not in extremis, voiding through/around infantile catheter, comfortable  Discussed case with interventional radiology they will place a suprapubic tube tomorrow under anesthesia  Patient agrees to plan  NPO at midnight and hold aspirin    He knows he will need to keep the suprapubic tube to drainage for at least several weeks, and to follow-up with urology for outpatient cystoscopy interrogation of his urethra after this blistering heals.  I do not have a good explanation for the blister on the glans.  Monitor for development of new rash or lesions        Subjective: 79 year old man acute onset swelling and blistering over the glans penis began last night then was unable to void. blister ruptured while in ER waiting room and some urine drained passively. A 6 Fr infantile catheter was placed in the ER patient reports this drained almost 400ml of clear yellow urine. No draining much anymore now that he is on the medsurg unit. It is very painful for him. the skin is very raw. Never had skin issues before. Not diabetic. Never saw urology before. On chart review he was treated for enterococcus faecalis UTI in february this year. Pt does endorse crooked stream for 4-5 months. No new topicals/sexual partners/latex/condoms or known new exposure to the penis. No rash elsewhere on the body. No abx since february.    Review of  Systems   Constitutional: Negative.    Respiratory: Negative.     Cardiovascular: Negative.    Genitourinary:  Positive for difficulty urinating, penile pain and penile swelling. Negative for decreased urine volume, dysuria, flank pain, frequency, hematuria, penile discharge and urgency.   Musculoskeletal: Negative.        Objective:  Vitals: Blood pressure 135/77, pulse 66, temperature 98.4 °F (36.9 °C), resp. rate 16, weight 85.8 kg (189 lb 2.5 oz), SpO2 95%.,Body mass index is 27.93 kg/m².    Physical Exam  Vitals and nursing note reviewed.   Constitutional:       Appearance: He is well-developed.   HENT:      Head: Normocephalic and atraumatic.   Pulmonary:      Effort: Pulmonary effort is normal.      Breath sounds: Normal breath sounds.   Abdominal:      General: Bowel sounds are normal. There is no distension.      Palpations: Abdomen is soft.      Tenderness: There is no abdominal tenderness. There is no right CVA tenderness or left CVA tenderness.   Genitourinary:     Comments: Circumcised penis, orthotopic though pinpoint meatus with surrounding glans erythema edema and tenderness.  There is sloughing of a single large blister/bulla of the glans.  Penile shaft nontender no erythema or swelling.  Testes descended bilaterally nontender no erythema or swelling.  Musculoskeletal:         General: No edema. Normal range of motion.   Skin:     General: Skin is warm and dry.      Capillary Refill: Capillary refill takes less than 2 seconds.      Coloration: Skin is not pale.      Findings: No erythema, lesion or rash.   Neurological:      Mental Status: He is alert and oriented to person, place, and time.         Imaging: NO CURRENT images    RENAL ULTRASOUND prior admission 2/18/2024     INDICATION: gnr bacteremia and uti, assess for hydronephrosis.     COMPARISON: None     TECHNIQUE: Ultrasound of the retroperitoneum was performed with a curvilinear transducer utilizing volumetric sweeps and still imaging  techniques.     FINDINGS:     KIDNEYS:  Symmetric and normal size.  Right kidney: 9.8 x 6.1 x 5.8 cm. Volume 179.8 mL  Left kidney: 9.6 x 4.7 x 4.6 cm. Volume 107.9 mL     Right kidney  Normal echogenicity and contour.  No mass is identified.  No hydronephrosis.  No shadowing calculi.  No perinephric fluid collections.     Left kidney  Normal echogenicity and contour.  No mass is identified.  No hydronephrosis.  No shadowing calculi.  Minimal simple perinephric fluid.     URETERS:  Nonvisualized.     BLADDER:  Bladder not optimally distended. Per technician note, patient voided prior to exam.  Within limitations, no focal thickening or mass lesions.  Small cystic structure adjacent to the bladder likely to represent a small bladder diverticulum.  Right jet visualized. Left jet not visualized.     OTHER: Mildly increased hepatic parenchymal echogenicity consistent with mild hepatic steatosis.        IMPRESSION:     1. No hydronephrosis.     2. Underdistended bladder. Within limitations, no focal thickening or mass lesions. Small cystic structure adjacent to the bladder likely to represent a small bladder diverticulum.     3. Mild hepatic steatosis.     Imaging reviewed - both report and images personally reviewed.     Labs:  Recent Labs     07/27/24  0540   WBC 9.01     Recent Labs     07/27/24  0540   HGB 12.1       Recent Labs     07/27/24  0540   CREATININE 2.32*       History:  Social History     Socioeconomic History    Marital status: /Civil Union     Spouse name: None    Number of children: None    Years of education: None    Highest education level: None   Occupational History    None   Tobacco Use    Smoking status: Never    Smokeless tobacco: Never   Vaping Use    Vaping status: Never Used   Substance and Sexual Activity    Alcohol use: Yes     Comment: social    Drug use: Never    Sexual activity: Never   Other Topics Concern    None   Social History Narrative    None     Social Determinants of  Health     Financial Resource Strain: Not on file   Food Insecurity: No Food Insecurity (7/27/2024)    Hunger Vital Sign     Worried About Running Out of Food in the Last Year: Never true     Ran Out of Food in the Last Year: Never true   Transportation Needs: No Transportation Needs (7/27/2024)    PRAPARE - Transportation     Lack of Transportation (Medical): No     Lack of Transportation (Non-Medical): No   Physical Activity: Not on file   Stress: Not on file   Social Connections: Not on file   Intimate Partner Violence: Not on file   Housing Stability: Low Risk  (7/27/2024)    Housing Stability Vital Sign     Unable to Pay for Housing in the Last Year: No     Number of Times Moved in the Last Year: 0     Homeless in the Last Year: No       Past Medical History:   Diagnosis Date    Asthma     CKD (chronic kidney disease)     Hypercholesteremia     Hypertension     Paroxysmal A-fib (HCC)     Paroxysmal SVT (supraventricular tachycardia)      Past Surgical History:   Procedure Laterality Date    CYST REMOVAL      IR TUNNELED CENTRAL LINE PLACEMENT  2/21/2024    TONSILLECTOMY       Family History   Problem Relation Age of Onset    Diabetes Mother     No Known Problems Father        Carisa Piña PA-C  Date: 7/27/2024 Time: 4:43 PM

## 2024-07-27 NOTE — PLAN OF CARE
Problem: PAIN - ADULT  Goal: Verbalizes/displays adequate comfort level or baseline comfort level  Description: Interventions:  - Encourage patient to monitor pain and request assistance  - Assess pain using appropriate pain scale  - Administer analgesics based on type and severity of pain and evaluate response  - Implement non-pharmacological measures as appropriate and evaluate response  - Consider cultural and social influences on pain and pain management  - Notify physician/advanced practitioner if interventions unsuccessful or patient reports new pain  Outcome: Progressing     Problem: INFECTION - ADULT  Goal: Absence or prevention of progression during hospitalization  Description: INTERVENTIONS:  - Assess and monitor for signs and symptoms of infection  - Monitor lab/diagnostic results  - Monitor all insertion sites, i.e. indwelling lines, tubes, and drains  - Monitor endotracheal if appropriate and nasal secretions for changes in amount and color  - [place] appropriate cooling/warming therapies per order  - Administer medications as ordered  - Instruct and encourage patient and family to use good hand hygiene technique  - Identify and instruct in appropriate isolation precautions for identified infection/condition  Outcome: Progressing  Goal: Absence of fever/infection during neutropenic period  Description: INTERVENTIONS:  - Monitor WBC    Outcome: Progressing     Problem: SAFETY ADULT  Goal: Patient will remain free of falls  Description: INTERVENTIONS:  - Educate patient/family on patient safety including physical limitations  - Instruct patient to call for assistance with activity   - Consult OT/PT to assist with strengthening/mobility   - Keep Call bell within reach  - Keep bed low and locked with side rails adjusted as appropriate  - Keep care items and personal belongings within reach  - Initiate and maintain comfort rounds  - Make Fall Risk Sign visible to staff  - Offer Toileting every 2 Hours,  in advance of need  - Obtain necessary fall risk management equipment: call bell in reach  - Apply yellow socks and bracelet for high fall risk patients  - Consider moving patient to room near nurses station  Outcome: Progressing  Goal: Maintain or return to baseline ADL function  Description: INTERVENTIONS:  -  Assess patient's ability to carry out ADLs; assess patient's baseline for ADL function and identify physical deficits which impact ability to perform ADLs (bathing, care of mouth/teeth, toileting, grooming, dressing, etc.)  - Assess/evaluate cause of self-care deficits   - Assess range of motion  - Assess patient's mobility; develop plan if impaired  - Assess patient's need for assistive devices and provide as appropriate  - Encourage maximum independence but intervene and supervise when necessary  - Involve family in performance of ADLs  - Assess for home care needs following discharge   - Consider OT consult to assist with ADL evaluation and planning for discharge  - Provide patient education as appropriate  Outcome: Progressing  Goal: Maintains/Returns to pre admission functional level  Description: INTERVENTIONS:  - Perform AM-PAC 6 Click Basic Mobility/ Daily Activity assessment daily.  - Set and communicate daily mobility goal to care team and patient/family/caregiver.   - Collaborate with rehabilitation services on mobility goals if consulted  - Perform Range of Motion 3 times a day.  - Reposition patient every 2 hours.  - Dangle patient 3 times a day  - Stand patient 3 times a day  - Ambulate patient 3 times a day  - Out of bed to chair 3 times a day   - Out of bed for meals 3 times a day  - Out of bed for toileting  - Record patient progress and toleration of activity level   Outcome: Progressing     Problem: DISCHARGE PLANNING  Goal: Discharge to home or other facility with appropriate resources  Description: INTERVENTIONS:  - Identify barriers to discharge w/patient and caregiver  - Arrange for  needed discharge resources and transportation as appropriate  - Identify discharge learning needs (meds, wound care, etc.)  - Arrange for interpretive services to assist at discharge as needed  - Refer to Case Management Department for coordinating discharge planning if the patient needs post-hospital services based on physician/advanced practitioner order or complex needs related to functional status, cognitive ability, or social support system  Outcome: Progressing     Problem: Knowledge Deficit  Goal: Patient/family/caregiver demonstrates understanding of disease process, treatment plan, medications, and discharge instructions  Description: Complete learning assessment and assess knowledge base.  Interventions:  - Provide teaching at level of understanding  - Provide teaching via preferred learning methods  Outcome: Progressing

## 2024-07-27 NOTE — ASSESSMENT & PLAN NOTE
Acute urinary retention  Meatal stenosis  Bullous blistering rash of the glans no rash elsewhere on the body; does not seem typical of balanitis; no recent illness/exposure to explain a dermatitis. he is being treated with IV abx while awaiting urine cultures as well  Sp SPT placement yesterday  VSS, afebrile   No leukocytosis  Stable for discharge  Will arrange outpatient urologic follow up

## 2024-07-27 NOTE — ED PROVIDER NOTES
"History  Chief Complaint   Patient presents with    Urinary Retention     Pt presents to the ED with c/o urinary retention. Last urinated 2200 last night. Reports pain/pressure.      Mathew Sloan Jr. is a 79 y.o. male with a PMH of asthma, CKD, hypertension, paroxysmal A-fib and paroxysmal ED presenting to the ED on July 27, 2024 with urinary retention. Patient endorses that he has been unable to urinate since 10 PM last night.  Patient states that he has never had difficulty urinating and that he did not have any erythema or penile discharge prior to his symptoms beginning.  He is now endorsing abdominal pain secondary to this.  When he was still unable to urinate early this morning he came to the emergency department where he states he noticed a \"bubble\" at the tip of his penis that was filled with urine and blood.  He states that while he was attempting to urinated this popped and some urine mixed with blood leaked out.  He does note that he had a urinary tract infection in February and then he was instructed to see urology because of this and because his stream has shifted to the right however patient has been unable to see them. Patient denies trauma to his penis, discharge from his penis, dysuria, frequency, urgency, fevers, chills, nausea, vomiting or any other complaints at this time.             Prior to Admission Medications   Prescriptions Last Dose Informant Patient Reported? Taking?   Apoaequorin (PREVAGEN PO)   Yes No   Sig: Take by mouth in the morning   aspirin 81 mg chewable tablet   Yes No   Sig: Chew 81 mg every 12 (twelve) hours   guaiFENesin (MUCINEX) 600 mg 12 hr tablet  Self Yes No   Sig: Take 1,200 mg by mouth daily   metoprolol succinate (TOPROL-XL) 25 mg 24 hr tablet   No No   Sig: Take 1 tablet (25 mg total) by mouth every 12 (twelve) hours   multivitamin (THERAGRAN) TABS  Self Yes No   Sig: Take 1 tablet by mouth daily   omeprazole (PriLOSEC) 10 mg delayed release capsule  Self Yes No "   Sig: Take 10 mg by mouth daily      Facility-Administered Medications: None       Past Medical History:   Diagnosis Date    Asthma     CKD (chronic kidney disease)     Hypercholesteremia     Hypertension     Paroxysmal A-fib (HCC)     Paroxysmal SVT (supraventricular tachycardia)        Past Surgical History:   Procedure Laterality Date    CYST REMOVAL      IR TUNNELED CENTRAL LINE PLACEMENT  2/21/2024    TONSILLECTOMY         Family History   Problem Relation Age of Onset    Diabetes Mother     No Known Problems Father      I have reviewed and agree with the history as documented.    E-Cigarette/Vaping    E-Cigarette Use Never User      E-Cigarette/Vaping Substances    Nicotine No     THC No     CBD No     Flavoring No     Other No     Unknown No      Social History     Tobacco Use    Smoking status: Never    Smokeless tobacco: Never   Vaping Use    Vaping status: Never Used   Substance Use Topics    Alcohol use: Yes     Comment: social    Drug use: Never       Review of Systems   Constitutional:  Negative for chills and fever.   Respiratory:  Negative for cough and shortness of breath.    Cardiovascular:  Negative for chest pain and palpitations.   Gastrointestinal:  Positive for abdominal pain. Negative for nausea and vomiting.   Genitourinary:  Positive for difficulty urinating, penile pain and penile swelling. Negative for dysuria, flank pain, frequency, genital sores, hematuria, penile discharge, scrotal swelling, testicular pain and urgency.   Skin:  Negative for color change, rash and wound.   Neurological:  Negative for seizures and syncope.   All other systems reviewed and are negative.      Physical Exam  Physical Exam  Vitals and nursing note reviewed. Exam conducted with a chaperone present.   Constitutional:       General: He is not in acute distress.     Appearance: Normal appearance. He is normal weight. He is not ill-appearing, toxic-appearing or diaphoretic.   HENT:      Head: Normocephalic and  atraumatic.      Right Ear: External ear normal.      Left Ear: External ear normal.      Nose: Nose normal. No congestion or rhinorrhea.      Mouth/Throat:      Mouth: Mucous membranes are moist.   Eyes:      General: No scleral icterus.        Right eye: No discharge.         Left eye: No discharge.      Extraocular Movements: Extraocular movements intact.      Conjunctiva/sclera: Conjunctivae normal.   Cardiovascular:      Rate and Rhythm: Normal rate and regular rhythm.      Heart sounds: Normal heart sounds. No murmur heard.     No friction rub. No gallop.   Pulmonary:      Effort: Pulmonary effort is normal. No respiratory distress.      Breath sounds: Normal breath sounds. No wheezing, rhonchi or rales.   Abdominal:      General: Abdomen is flat. Bowel sounds are normal. There is no distension.      Palpations: Abdomen is soft.      Tenderness: There is abdominal tenderness in the suprapubic area. There is no right CVA tenderness, left CVA tenderness, guarding or rebound.      Hernia: A hernia is present.   Genitourinary:     Penis: Erythema, tenderness, discharge and swelling present. No lesions.       Testes: Normal.      Comments: Skin sloughing/blistering and erythema and swelling to the glans of patient's penis.  This was deloculated in order to visualize and access the ureteral opening which is very swollen  Musculoskeletal:         General: No signs of injury. Normal range of motion.      Cervical back: Normal range of motion and neck supple. No rigidity.   Skin:     General: Skin is warm.      Capillary Refill: Capillary refill takes less than 2 seconds.      Coloration: Skin is not pale.      Findings: No erythema.   Neurological:      General: No focal deficit present.      Mental Status: He is alert and oriented to person, place, and time. Mental status is at baseline.      Motor: No weakness.      Gait: Gait normal.   Psychiatric:         Mood and Affect: Mood normal.         Behavior: Behavior  normal.         Vital Signs  ED Triage Vitals   Temperature Pulse Respirations Blood Pressure SpO2   07/27/24 0343 07/27/24 0343 07/27/24 0343 07/27/24 0343 07/27/24 0343   97.5 °F (36.4 °C) 83 17 (!) 174/95 94 %      Temp Source Heart Rate Source Patient Position - Orthostatic VS BP Location FiO2 (%)   07/27/24 0343 07/27/24 0343 -- 07/27/24 0343 --   Oral Monitor  Right arm       Pain Score       07/27/24 0344       2           Vitals:    07/27/24 0343   BP: (!) 174/95   Pulse: 83         Visual Acuity      ED Medications  Medications   ceftriaxone (ROCEPHIN) 1 g/50 mL in dextrose IVPB (1,000 mg Intravenous New Bag 7/27/24 0556)   Apoaequorin CAPS (has no administration in time range)   aspirin chewable tablet 81 mg (has no administration in time range)   guaiFENesin (MUCINEX) 12 hr tablet 1,200 mg (has no administration in time range)   metoprolol succinate (TOPROL-XL) 24 hr tablet 25 mg (has no administration in time range)   multivitamin stress formula tablet 1 tablet (has no administration in time range)   pantoprazole (PROTONIX) EC tablet 20 mg (has no administration in time range)   acetaminophen (TYLENOL) tablet 650 mg (has no administration in time range)   heparin (porcine) subcutaneous injection 5,000 Units (has no administration in time range)   ceftriaxone (ROCEPHIN) 1 g/50 mL in dextrose IVPB (has no administration in time range)   lidocaine (URO-JET) 2 % urethral/mucosal gel 1 Application (1 Application Urethral Given 7/27/24 0422)   fentaNYL injection 25 mcg (25 mcg Intravenous Given 7/27/24 0519)       Diagnostic Studies  Results Reviewed       Procedure Component Value Units Date/Time    Urine Microscopic [[id number]]  (Abnormal) Collected: 07/27/24 0540    Lab Status: Final result Specimen: Urine, Indwelling Lomeli Catheter Updated: 07/27/24 0612     RBC, UA 2-4 /hpf      WBC, UA Innumerable /hpf      Epithelial Cells None Seen /hpf      Bacteria, UA Occasional /hpf     Urine culture [[id number]]  Collected: 07/27/24 0540    Lab Status: In process Specimen: Urine, Indwelling Lomeli Catheter Updated: 07/27/24 0612    Comprehensive metabolic panel [[id number]]  (Abnormal) Collected: 07/27/24 0540    Lab Status: Final result Specimen: Blood from Arm, Right Updated: 07/27/24 0607     Sodium 137 mmol/L      Potassium 4.1 mmol/L      Chloride 105 mmol/L      CO2 25 mmol/L      ANION GAP 7 mmol/L      BUN 27 mg/dL      Creatinine 2.32 mg/dL      Glucose 136 mg/dL      Calcium 9.2 mg/dL      AST 17 U/L      ALT 9 U/L      Alkaline Phosphatase 55 U/L      Total Protein 7.2 g/dL      Albumin 4.2 g/dL      Total Bilirubin 0.45 mg/dL      eGFR 25 ml/min/1.73sq m     Narrative:      National Kid[employer] guidelines for Chronic Kidney Disease (CKD):     Stage 1 with normal or high GFR (GFR > 90 mL/min/1.73 square meters)    Stage 2 Mild CKD (GFR = 60-89 mL/min/1.73 square meters)    Stage 3A Moderate CKD (GFR = 45-59 mL/min/1.73 square meters)    Stage 3B Moderate CKD (GFR = 30-44 mL/min/1.73 square meters)    Stage 4 Severe CKD (GFR = 15-29 mL/min/1.73 square meters)    Stage 5 End Stage CKD (GFR <15 mL/min/1.73 square meters)  Note: GFR calculation is accurate only with a steady state creatinine    UA w Reflex to Microscopic w Reflex to Culture [[id number]]  (Abnormal) Collected: 07/27/24 0540    Lab Status: Final result Specimen: Urine, Indwelling Lomeli Catheter Updated: 07/27/24 0602     Color, UA Light Yellow     Clarity, UA Clear     Specific Gravity, UA 1.017     pH, UA 6.5     Leukocytes, UA Moderate     Nitrite, UA Positive     Protein, UA 50 (1+) mg/dl      Glucose,  (1/10%) mg/dl      Ketones, UA Negative mg/dl      Urobilinogen, UA <2.0 mg/dl      Bilirubin, UA Negative     Occult Blood, UA Negative    CBC and differential [[id number]] Collected: 07/27/24 0540    Lab Status: Final result Specimen: Blood from Arm, Right Updated: 07/27/24 0549     WBC 9.01 Thousand/uL      RBC 3.96 Million/uL       "Hemoglobin 12.1 g/dL      Hematocrit 37.6 %      MCV 95 fL      MCH 30.6 pg      MCHC 32.2 g/dL      RDW 13.1 %      MPV 9.7 fL      Platelets 161 Thousands/uL      nRBC 0 /100 WBCs      Segmented % 70 %      Immature Grans % 0 %      Lymphocytes % 17 %      Monocytes % 7 %      Eosinophils Relative 5 %      Basophils Relative 1 %      Absolute Neutrophils 6.38 Thousands/µL      Absolute Immature Grans 0.04 Thousand/uL      Absolute Lymphocytes 1.49 Thousands/µL      Absolute Monocytes 0.64 Thousand/µL      Eosinophils Absolute 0.41 Thousand/µL      Basophils Absolute 0.05 Thousands/µL                    No orders to display              Procedures  Procedures         ED Course  ED Course as of 07/27/24 0624   Sat Jul 27, 2024   0512 Blistering over                                                Medical Decision Making  Patient seen and examined noted to have balanitis and urinary retention.  Patient endorsing that his symptoms began at 10 PM last night.  Swelling and skin changes progressed during his time in the department.  Skin was deloculated and attempted to pass an 18 Amharic catheter.  This was unsuccessful secondary to swelling.  Eventually was able to pass a 6 Amharic catheter.  Reached out to urology who recommended admission to [facility] with a consult to their service.     Differential diagnosis includes but is not limited to cellulitis, balanitis, posthitis, balanoposthitis, tumor,  UTI    Patient's labs notable for: pending at the time of admission    Discussed patient's case with [facility] residents regarding admission who accepted the patient for further evaluation and management.    All labs reviewed and utilized in the medical decision making process (if labs were ordered). Portions of the record may have been created with voice recognition software.  Occasional wrong word or \"sound a like\" substitutions may have occurred due to the inherent limitations of voice recognition software.  Read the chart carefully " and recognize, using context, where substitutions have occurred.      Amount and/or Complexity of Data Reviewed  Labs: ordered.    Risk  Prescription drug management.  Decision regarding hospitalization.                 Disposition  Final diagnoses:   Balanitis   Urinary retention     Time reflects when diagnosis was documented in both MDM as applicable and the Disposition within this note       Time User Action Codes Description Comment    7/27/2024  5:45 AM Virginia Bradshaw Add [N48.1] Balanitis     7/27/2024  5:45 AM Virginia Bradshaw Add [R33.9] Urinary retention     7/27/2024  6:19 AM Michelle Victor Add [N39.0] UTI (urinary tract infection)           ED Disposition       ED Disposition   Admit    Condition   Stable    Date/Time   Sat Jul 27, 2024  5:58 AM    Comment   Case was discussed with Dr. Ervin Mujica and the patient's admission status was agreed to be Admission Status: inpatient status to the service of Dr. Edmond.               Follow-up Information    None         Patient's Medications   Discharge Prescriptions    No medications on file       No discharge procedures on file.    PDMP Review         Value Time User    PDMP Reviewed  Yes 7/27/2024  3:42 AM Ab Perez MD            ED Provider  Electronically Signed by             Virginia Bradshaw PA-C  07/27/24 0624

## 2024-07-27 NOTE — ASSESSMENT & PLAN NOTE
Recent Labs     07/27/24  0540   CREATININE 2.32*   EGFR 25     Estimated Creatinine Clearance: 27.1 mL/min (A) (by C-G formula based on SCr of 2.32 mg/dL (H)).    On admission Cr 2.31 (baseline Cr 2.0-2.2), GFR 25 (baseline GFR 26-30)    Plan:  Monitor BMP  Avoid hypoperfusion of the kidneys, minimize nephrotoxins

## 2024-07-27 NOTE — PLAN OF CARE
Problem: PAIN - ADULT  Goal: Verbalizes/displays adequate comfort level or baseline comfort level  Description: Interventions:  - Encourage patient to monitor pain and request assistance  - Assess pain using appropriate pain scale  - Administer analgesics based on type and severity of pain and evaluate response  - Implement non-pharmacological measures as appropriate and evaluate response  - Consider cultural and social influences on pain and pain management  - Notify physician/advanced practitioner if interventions unsuccessful or patient reports new pain  Outcome: Progressing     Problem: INFECTION - ADULT  Goal: Absence or prevention of progression during hospitalization  Description: INTERVENTIONS:  - Assess and monitor for signs and symptoms of infection  - Monitor lab/diagnostic results  - Monitor all insertion sites, i.e. indwelling lines, tubes, and drains  - Monitor endotracheal if appropriate and nasal secretions for changes in amount and color  - [place] appropriate cooling/warming therapies per order  - Administer medications as ordered  - Instruct and encourage patient and family to use good hand hygiene technique  - Identify and instruct in appropriate isolation precautions for identified infection/condition  Outcome: Progressing  Goal: Absence of fever/infection during neutropenic period  Description: INTERVENTIONS:  - Monitor WBC    Outcome: Progressing     Problem: SAFETY ADULT  Goal: Patient will remain free of falls  Description: INTERVENTIONS:  - Educate patient/family on patient safety including physical limitations  - Instruct patient to call for assistance with activity   - Consult OT/PT to assist with strengthening/mobility   - Keep Call bell within reach  - Keep bed low and locked with side rails adjusted as appropriate  - Keep care items and personal belongings within reach  - Initiate and maintain comfort rounds  - Make Fall Risk Sign visible to staff  - Obtain necessary fall risk  management equipment  - Apply yellow socks and bracelet for high fall risk patients  - Consider moving patient to room near nurses station  Outcome: Progressing  Goal: Maintain or return to baseline ADL function  Description: INTERVENTIONS:  -  Assess patient's ability to carry out ADLs; assess patient's baseline for ADL function and identify physical deficits which impact ability to perform ADLs (bathing, care of mouth/teeth, toileting, grooming, dressing, etc.)  - Assess/evaluate cause of self-care deficits   - Assess range of motion  - Assess patient's mobility; develop plan if impaired  - Assess patient's need for assistive devices and provide as appropriate  - Encourage maximum independence but intervene and supervise when necessary  - Involve family in performance of ADLs  - Assess for home care needs following discharge   - Consider OT consult to assist with ADL evaluation and planning for discharge  - Provide patient education as appropriate  Outcome: Progressing  Goal: Maintains/Returns to pre admission functional level  Description: INTERVENTIONS:  - Perform AM-PAC 6 Click Basic Mobility/ Daily Activity assessment daily.  - Set and communicate daily mobility goal to care team and patient/family/caregiver.   - Collaborate with rehabilitation services on mobility goals if consulted  - Perform Range of Motion 3 times a day.  - Reposition patient every 2 hours.  - Dangle patient 3 times a day  - Stand patient 3 times a day  - Ambulate patient 3 times a day  - Out of bed to chair 3 times a day   - Out of bed for meals 3 times a day  - Out of bed for toileting  - Record patient progress and toleration of activity level   Outcome: Progressing     Problem: DISCHARGE PLANNING  Goal: Discharge to home or other facility with appropriate resources  Description: INTERVENTIONS:  - Identify barriers to discharge w/patient and caregiver  - Arrange for needed discharge resources and transportation as appropriate  -  Identify discharge learning needs (meds, wound care, etc.)  - Arrange for interpretive services to assist at discharge as needed  - Refer to Case Management Department for coordinating discharge planning if the patient needs post-hospital services based on physician/advanced practitioner order or complex needs related to functional status, cognitive ability, or social support system  Outcome: Progressing     Problem: Knowledge Deficit  Goal: Patient/family/caregiver demonstrates understanding of disease process, treatment plan, medications, and discharge instructions  Description: Complete learning assessment and assess knowledge base.  Interventions:  - Provide teaching at level of understanding  - Provide teaching via preferred learning methods  Outcome: Progressing     Problem: Prexisting or High Potential for Compromised Skin Integrity  Goal: Skin integrity is maintained or improved  Description: INTERVENTIONS:  - Identify patients at risk for skin breakdown  - Assess and monitor skin integrity  - Assess and monitor nutrition and hydration status  - Monitor labs   - Assess for incontinence   - Turn and reposition patient  - Assist with mobility/ambulation  - Relieve pressure over bony prominences  - Avoid friction and shearing  - Provide appropriate hygiene as needed including keeping skin clean and dry  - Evaluate need for skin moisturizer/barrier cream  - Collaborate with interdisciplinary team   - Patient/family teaching  - Consider wound care consult   Outcome: Progressing

## 2024-07-27 NOTE — H&P
[facility]  H&P  Name: Mathew Sloan Jr. 79 y.o. male I MRN: [id number]  Unit/Bed#: S -01 I Date of Admission: 7/27/2024   Date of Service: 7/27/2024 I Hospital Day: 0      Assessment & Plan   * Complicated UTI (urinary tract infection)  Assessment & Plan  Presented with balanitis, urinary retention, and dysuria  Lomeli placed in ED  UA: nitrates, leukocytes, WBC, occasional bacteria  History of enterococcus faecalis UTI in February with enterococcus faecalis bacteremia    Plan:  Continue Ceftriaxone, consider starting Ampicillin for history of enterococcus faecalis  F/u urine cultures  Urology consulted        Balanitis  Assessment & Plan  See complicated UTI      Stage 3b chronic kidney disease (HCC)  Assessment & Plan  Recent Labs     07/27/24  0540   CREATININE 2.32*   EGFR 25     Estimated Creatinine Clearance: 27.1 mL/min (A) (by C-G formula based on SCr of 2.32 mg/dL (H)).    On admission Cr 2.31 (baseline Cr 2.0-2.2), GFR 25 (baseline GFR 26-30)    Plan:  Monitor BMP  Avoid hypoperfusion of the kidneys, minimize nephrotoxins      Essential hypertension  Assessment & Plan  Blood Pressure: (!) 174/95, most likely elevated due to pain  Continue Toprol-XL 25mg BID           VTE Pharmacologic Prophylaxis: VTE Score: 4 Moderate Risk (Score 3-4) - Pharmacological DVT Prophylaxis Ordered: enoxaparin (Lovenox).  Code Status: Level 1 - Full Code, discussed with patient  Discussion with family: Updated  (wife) at bedside.    Anticipated Length of Stay: Patient will be admitted on an inpatient basis with an anticipated length of stay of greater than 2 midnights secondary to UTI.    Chief Complaint: urinary retention    History of Present Illness:  Mathew Sloan Jr. is a 79 y.o. male with a PMH of HTN, BPH, enterococcus faecalis UTI and bacteremia requiring hospital admission in 02/204, CKD, paroxsymal Afib not on AC who presents with urinary retention.    Reports  "urinary retention since 10pm, reports was getting worse, felt a small red \"bubble\" coming out of urerthera. On arrival to the ED, bubble bursted and noted increased bleeding. Reports pain with urination. History of enterococcus faecalis UTI in February with enterococcus faecalis bacteremia.    In ED, patient hypertensive 174/95. CBC unremarkable. UA positive. Kidney function near baseline. In the ED, he received ceftriaxone, fentanyl, and urojet.     Review of Systems:  Review of Systems   Constitutional:  Negative for chills and fever.   HENT:  Negative for ear pain and sore throat.    Eyes:  Negative for pain and visual disturbance.   Respiratory:  Negative for cough and shortness of breath.    Cardiovascular:  Negative for chest pain and palpitations.   Gastrointestinal:  Negative for abdominal pain and vomiting.   Genitourinary:  Positive for difficulty urinating, dysuria, hematuria and penile swelling.   Musculoskeletal:  Negative for arthralgias and back pain.   Skin:  Negative for color change and rash.   Neurological:  Negative for headaches.   All other systems reviewed and are negative.      Past Medical and Surgical History:   Past Medical History:   Diagnosis Date    Asthma     CKD (chronic kidney disease)     Hypercholesteremia     Hypertension     Paroxysmal A-fib (HCC)     Paroxysmal SVT (supraventricular tachycardia)        Past Surgical History:   Procedure Laterality Date    CYST REMOVAL      IR TUNNELED CENTRAL LINE PLACEMENT  2/21/2024    TONSILLECTOMY         Meds/Allergies:  Prior to Admission medications    Medication Sig Start Date End Date Taking? Authorizing Provider   Apoaequorin (PREVAGEN PO) Take by mouth in the morning    Historical Provider, MD   aspirin 81 mg chewable tablet Chew 81 mg every 12 (twelve) hours    Historical Provider, MD   Cholecalciferol (Vitamin D-3) 25 MCG (1000 UT) CAPS Take by mouth    Historical Provider, MD   guaiFENesin (MUCINEX) 600 mg 12 hr tablet Take 1,200 " mg by mouth daily    Historical Provider, MD   metoprolol succinate (TOPROL-XL) 25 mg 24 hr tablet Take 1 tablet (25 mg total) by mouth every 12 (twelve) hours 10/3/23   Nimesh Perez MD   multivitamin (THERAGRAN) TABS Take 1 tablet by mouth daily    Historical Provider, MD   omeprazole (PriLOSEC) 10 mg delayed release capsule Take 10 mg by mouth daily    Historical Provider, MD     I have reviewed home medications with patient personally.    Allergies:   Allergies   Allergen Reactions    Chlorpheniramine Other (See Comments)    Phenylephrine Other (See Comments)       Social History:  Marital Status: /Civil Union   Patient Pre-hospital Living Situation: Home, With spouse  Patient Pre-hospital Level of Mobility: walks  Patient Pre-hospital Diet Restrictions: None  Substance Use History:   Social History     Substance and Sexual Activity   Alcohol Use Yes    Comment: social     Social History     Tobacco Use   Smoking Status Never   Smokeless Tobacco Never     Social History     Substance and Sexual Activity   Drug Use Never       Family History:  Family History   Problem Relation Age of Onset    Diabetes Mother     No Known Problems Father        Physical Exam:     Vitals:   Blood Pressure: (!) 174/95 (07/27/24 0343)  Pulse: 83 (07/27/24 0343)  Temperature: 97.5 °F (36.4 °C) (07/27/24 0343)  Temp Source: Oral (07/27/24 0343)  Respirations: 17 (07/27/24 0343)  Weight - Scale: 85.8 kg (189 lb 2.5 oz) (07/27/24 0343)  SpO2: 94 % (07/27/24 0343)    Physical Exam  Vitals and nursing note reviewed.   Constitutional:       General: He is not in acute distress.     Appearance: He is well-developed.   HENT:      Head: Normocephalic and atraumatic.   Eyes:      Conjunctiva/sclera: Conjunctivae normal.   Cardiovascular:      Rate and Rhythm: Normal rate and regular rhythm.      Heart sounds: Murmur heard.   Pulmonary:      Effort: Pulmonary effort is normal. No respiratory distress.      Breath sounds:  Rales (bibasilar) present.   Abdominal:      General: Bowel sounds are normal. There is no distension.      Palpations: Abdomen is soft.      Tenderness: There is no abdominal tenderness.   Genitourinary:     Penis: Erythema, tenderness and swelling present.       Comments: Slothing skin around the head of the penis  Musculoskeletal:         General: No swelling.      Cervical back: Neck supple.   Skin:     General: Skin is warm and dry.      Capillary Refill: Capillary refill takes less than 2 seconds.   Neurological:      Mental Status: He is alert.   Psychiatric:         Mood and Affect: Mood normal.       Additional Data:     Lab Results:  Results from last 7 days   Lab Units 07/27/24  0540   WBC Thousand/uL 9.01   HEMOGLOBIN g/dL 12.1   HEMATOCRIT % 37.6   PLATELETS Thousands/uL 161   SEGS PCT % 70   LYMPHO PCT % 17   MONO PCT % 7   EOS PCT % 5     Results from last 7 days   Lab Units 07/27/24  0540   SODIUM mmol/L 137   POTASSIUM mmol/L 4.1   CHLORIDE mmol/L 105   CO2 mmol/L 25   BUN mg/dL 27*   CREATININE mg/dL 2.32*   ANION GAP mmol/L 7   CALCIUM mg/dL 9.2   ALBUMIN g/dL 4.2   TOTAL BILIRUBIN mg/dL 0.45   ALK PHOS U/L 55   ALT U/L 9   AST U/L 17   GLUCOSE RANDOM mg/dL 136             Lab Results   Component Value Date    HGBA1C 6.0 (H) 02/21/2021           Lines/Drains:  Invasive Devices       Peripheral Intravenous Line  Duration             Peripheral IV 07/27/24 Dorsal (posterior);Right Hand <1 day              Drain  Duration             Urethral Catheter Double-lumen 10 Fr. <1 day                  Urinary Catheter:  Goal for removal: Remove after 48 hrs of I/O monitoring             Imaging: No pertinent imaging reviewed.  No orders to display       EKG and Other Studies Reviewed on Admission:   EKG: No EKG obtained.      Tobacco and Toxic Substance Assessment and Intervention:     Tobacco use screening performed    Alcohol and drug use screening performed        ** Please Note: This note has been  constructed using a voice recognition system. **

## 2024-07-27 NOTE — CONSULTS
[facility] Electronic [facility] Record Consult  IR has been consulted to evaluate the patient, determine the appropriate procedure, and whether or not a procedure can and should be performed regarding the care of Mathew Sloan Jr..    We were consulted by urology concerning Teodoro Farmer Jr, and to possibly perform a suprapubic catheter if medically appropriate for the patient. The patient is aware that a specialty consultation is taking place, and agrees to the IR Consult on their behalf.     Assessment/Plan:   78 yo male with urinary retention and meatal stricture.  Urology is requesting a suprapubic catheter.  Plan to place tomorrow.  NPO after midnight.  Will have IR coordinate with Anesthesiology.  Will order new coagulation labs as last done in February.    I spent 25 minutes in medical consultative time evaluating the medical record and imaging of Mathew Sloan Jr..    Thank you for allowing [facility] to participate in the care of Mathew Sloan Jr.. Please don't hesitate to call or TigerText us with any questions.     Bethel Eller, ,r

## 2024-07-27 NOTE — ASSESSMENT & PLAN NOTE
LDL is 69 continue atrovastatin and follwoup in 6 months Presented with balanitis, urinary retention, and dysuria  Lomeli placed in ED  UA: nitrates, leukocytes, WBC, occasional bacteria  History of enterococcus faecalis UTI in February with enterococcus faecalis bacteremia    Plan:  Continue Ceftriaxone, consider starting Ampicillin for history of enterococcus faecalis  F/u urine cultures  Urology consulted

## 2024-07-28 ENCOUNTER — APPOINTMENT (INPATIENT)
Dept: RADIOLOGY | Facility: HOSPITAL | Age: 79
DRG: 728 | End: 2024-07-28
Attending: RADIOLOGY
Payer: MEDICARE

## 2024-07-28 ENCOUNTER — ANESTHESIA EVENT (INPATIENT)
Dept: RADIOLOGY | Facility: HOSPITAL | Age: 79
DRG: 728 | End: 2024-07-28
Payer: MEDICARE

## 2024-07-28 ENCOUNTER — ANESTHESIA (INPATIENT)
Dept: RADIOLOGY | Facility: HOSPITAL | Age: 79
DRG: 728 | End: 2024-07-28
Payer: MEDICARE

## 2024-07-28 LAB
ANION GAP SERPL CALCULATED.3IONS-SCNC: 8 MMOL/L (ref 4–13)
BASOPHILS # BLD AUTO: 0.05 THOUSANDS/ÂΜL (ref 0–0.1)
BASOPHILS NFR BLD AUTO: 1 % (ref 0–1)
BUN SERPL-MCNC: 29 MG/DL (ref 5–25)
CALCIUM SERPL-MCNC: 9.1 MG/DL (ref 8.4–10.2)
CHLORIDE SERPL-SCNC: 106 MMOL/L (ref 96–108)
CO2 SERPL-SCNC: 25 MMOL/L (ref 21–32)
CREAT SERPL-MCNC: 2.21 MG/DL (ref 0.6–1.3)
EOSINOPHIL # BLD AUTO: 0.51 THOUSAND/ÂΜL (ref 0–0.61)
EOSINOPHIL NFR BLD AUTO: 7 % (ref 0–6)
ERYTHROCYTE [DISTWIDTH] IN BLOOD BY AUTOMATED COUNT: 13.4 % (ref 11.6–15.1)
GFR SERPL CREATININE-BSD FRML MDRD: 26 ML/MIN/1.73SQ M
GLUCOSE SERPL-MCNC: 112 MG/DL (ref 65–140)
GLUCOSE SERPL-MCNC: 119 MG/DL (ref 65–140)
HCT VFR BLD AUTO: 36.3 % (ref 36.5–49.3)
HGB BLD-MCNC: 11.8 G/DL (ref 12–17)
IMM GRANULOCYTES # BLD AUTO: 0.02 THOUSAND/UL (ref 0–0.2)
IMM GRANULOCYTES NFR BLD AUTO: 0 % (ref 0–2)
INR PPP: 1.05 (ref 0.84–1.19)
LYMPHOCYTES # BLD AUTO: 2.29 THOUSANDS/ÂΜL (ref 0.6–4.47)
LYMPHOCYTES NFR BLD AUTO: 33 % (ref 14–44)
MCH RBC QN AUTO: 30.6 PG (ref 26.8–34.3)
MCHC RBC AUTO-ENTMCNC: 32.5 G/DL (ref 31.4–37.4)
MCV RBC AUTO: 94 FL (ref 82–98)
MONOCYTES # BLD AUTO: 0.62 THOUSAND/ÂΜL (ref 0.17–1.22)
MONOCYTES NFR BLD AUTO: 9 % (ref 4–12)
NEUTROPHILS # BLD AUTO: 3.43 THOUSANDS/ÂΜL (ref 1.85–7.62)
NEUTS SEG NFR BLD AUTO: 50 % (ref 43–75)
NRBC BLD AUTO-RTO: 0 /100 WBCS
PLATELET # BLD AUTO: 149 THOUSANDS/UL (ref 149–390)
PMV BLD AUTO: 9.4 FL (ref 8.9–12.7)
POTASSIUM SERPL-SCNC: 3.8 MMOL/L (ref 3.5–5.3)
PROTHROMBIN TIME: 14.3 SECONDS (ref 11.6–14.5)
RBC # BLD AUTO: 3.86 MILLION/UL (ref 3.88–5.62)
SODIUM SERPL-SCNC: 139 MMOL/L (ref 135–147)
WBC # BLD AUTO: 6.92 THOUSAND/UL (ref 4.31–10.16)

## 2024-07-28 PROCEDURE — 85610 PROTHROMBIN TIME: CPT | Performed by: RADIOLOGY

## 2024-07-28 PROCEDURE — 51102 DRAIN BL W/CATH INSERTION: CPT | Performed by: RADIOLOGY

## 2024-07-28 PROCEDURE — 99231 SBSQ HOSP IP/OBS SF/LOW 25: CPT | Performed by: UROLOGY

## 2024-07-28 PROCEDURE — NC001 PR NO CHARGE: Performed by: RADIOLOGY

## 2024-07-28 PROCEDURE — 76942 ECHO GUIDE FOR BIOPSY: CPT | Performed by: RADIOLOGY

## 2024-07-28 PROCEDURE — 82948 REAGENT STRIP/BLOOD GLUCOSE: CPT

## 2024-07-28 PROCEDURE — 80048 BASIC METABOLIC PNL TOTAL CA: CPT

## 2024-07-28 PROCEDURE — C1725 CATH, TRANSLUMIN NON-LASER: HCPCS

## 2024-07-28 PROCEDURE — C1894 INTRO/SHEATH, NON-LASER: HCPCS

## 2024-07-28 PROCEDURE — 76937 US GUIDE VASCULAR ACCESS: CPT

## 2024-07-28 PROCEDURE — 99232 SBSQ HOSP IP/OBS MODERATE 35: CPT | Performed by: INTERNAL MEDICINE

## 2024-07-28 PROCEDURE — C1769 GUIDE WIRE: HCPCS

## 2024-07-28 PROCEDURE — 85025 COMPLETE CBC W/AUTO DIFF WBC: CPT

## 2024-07-28 PROCEDURE — 0T9B30Z DRAINAGE OF BLADDER WITH DRAINAGE DEVICE, PERCUTANEOUS APPROACH: ICD-10-PCS | Performed by: INTERNAL MEDICINE

## 2024-07-28 RX ORDER — SODIUM CHLORIDE 9 MG/ML
INJECTION, SOLUTION INTRAVENOUS CONTINUOUS PRN
Status: DISCONTINUED | OUTPATIENT
Start: 2024-07-28 | End: 2024-07-28

## 2024-07-28 RX ORDER — PROPOFOL 10 MG/ML
INJECTION, EMULSION INTRAVENOUS CONTINUOUS PRN
Status: DISCONTINUED | OUTPATIENT
Start: 2024-07-28 | End: 2024-07-28

## 2024-07-28 RX ORDER — ONDANSETRON 2 MG/ML
4 INJECTION INTRAMUSCULAR; INTRAVENOUS ONCE AS NEEDED
Status: COMPLETED | OUTPATIENT
Start: 2024-07-28 | End: 2024-07-28

## 2024-07-28 RX ORDER — PROPOFOL 10 MG/ML
INJECTION, EMULSION INTRAVENOUS AS NEEDED
Status: DISCONTINUED | OUTPATIENT
Start: 2024-07-28 | End: 2024-07-28

## 2024-07-28 RX ORDER — LIDOCAINE WITH 8.4% SOD BICARB 0.9%(10ML)
SYRINGE (ML) INJECTION AS NEEDED
Status: COMPLETED | OUTPATIENT
Start: 2024-07-28 | End: 2024-07-28

## 2024-07-28 RX ORDER — VANCOMYCIN HYDROCHLORIDE 1 G/200ML
INJECTION, SOLUTION INTRAVENOUS AS NEEDED
Status: DISCONTINUED | OUTPATIENT
Start: 2024-07-28 | End: 2024-07-28

## 2024-07-28 RX ORDER — FENTANYL CITRATE/PF 50 MCG/ML
25 SYRINGE (ML) INJECTION
Status: DISCONTINUED | OUTPATIENT
Start: 2024-07-28 | End: 2024-07-28 | Stop reason: HOSPADM

## 2024-07-28 RX ADMIN — B-COMPLEX W/ C & FOLIC ACID TAB 1 TABLET: TAB at 09:17

## 2024-07-28 RX ADMIN — HEPARIN SODIUM 5000 UNITS: 5000 INJECTION INTRAVENOUS; SUBCUTANEOUS at 21:03

## 2024-07-28 RX ADMIN — Medication 10 ML: at 13:13

## 2024-07-28 RX ADMIN — ACETAMINOPHEN 650 MG: 325 TABLET, FILM COATED ORAL at 18:25

## 2024-07-28 RX ADMIN — ONDANSETRON 4 MG: 2 INJECTION INTRAMUSCULAR; INTRAVENOUS at 13:55

## 2024-07-28 RX ADMIN — HEPARIN SODIUM 5000 UNITS: 5000 INJECTION INTRAVENOUS; SUBCUTANEOUS at 14:19

## 2024-07-28 RX ADMIN — SODIUM CHLORIDE: 0.9 INJECTION, SOLUTION INTRAVENOUS at 12:36

## 2024-07-28 RX ADMIN — PROPOFOL 70 MCG/KG/MIN: 10 INJECTION, EMULSION INTRAVENOUS at 12:45

## 2024-07-28 RX ADMIN — IOHEXOL 23 ML: 300 INJECTION, SOLUTION INTRAVENOUS at 15:06

## 2024-07-28 RX ADMIN — DEXTROSE 1000 MG: 50 INJECTION, SOLUTION INTRAVENOUS at 06:04

## 2024-07-28 RX ADMIN — VANCOMYCIN HYDROCHLORIDE 1000 MG: 1 INJECTION, SOLUTION INTRAVENOUS at 13:07

## 2024-07-28 RX ADMIN — METOPROLOL SUCCINATE 25 MG: 25 TABLET, EXTENDED RELEASE ORAL at 17:30

## 2024-07-28 RX ADMIN — PROPOFOL 50 MG: 10 INJECTION, EMULSION INTRAVENOUS at 12:45

## 2024-07-28 RX ADMIN — PANTOPRAZOLE SODIUM 20 MG: 20 TABLET, DELAYED RELEASE ORAL at 06:03

## 2024-07-28 RX ADMIN — METOPROLOL SUCCINATE 25 MG: 25 TABLET, EXTENDED RELEASE ORAL at 06:03

## 2024-07-28 NOTE — BRIEF OP NOTE (RAD/CATH)
INTERVENTIONAL RADIOLOGY PROCEDURE NOTE    Date: 7/28/2024    Procedure: Suprapubic wiley placement  Procedure Summary       Date:  Room / Location:     Anesthesia Start:  Anesthesia Stop:     Procedure:  Diagnosis:     Scheduled Providers:  Responsible Provider:     Anesthesia Type: Not recorded ASA Status: Not recorded            Preoperative diagnosis:   1. Balanitis    2. Urinary retention    3. UTI (urinary tract infection)         Postoperative diagnosis: Same.    Surgeon: Bethel Eller DO     Assistant: None. No qualified resident was available.    Blood loss: minimal    Specimens: none     Findings:  18 [place] tip wiley placed into urinary bladder using ultrasound and fluoroscopic guidance    Complications: None immediate.    Anesthesia: local and MAC sedation

## 2024-07-28 NOTE — SEDATION DOCUMENTATION
Suprapubic catheter placement performed by Dr. Eller.  Anesthesia present for case.  Dry dressing in place.  Patient transported to PACU and report given.  .IR Procedure Bedrest Start Time is 1345.

## 2024-07-28 NOTE — ASSESSMENT & PLAN NOTE
The blister has busted and inflammation seems to be reducing compared to prior. Patient denied any pain as of today.    Plan:   UTI Continue Ceftriaxone  F/u urine cultures

## 2024-07-28 NOTE — DISCHARGE INSTRUCTIONS
Suprapubic Cystostomy     WHAT YOU NEED TO KNOW:   Suprapubic cystostomy is surgery to create a stoma (opening) through your abdomen into your bladder. This opening is where a catheter is inserted to drain urine. You may need a cystostomy if your urine flow is blocked.    DISCHARGE INSTRUCTIONS:   Resume your normal diet. Small sips of flat soda will help with mild nausea.    Follow up with your healthcare provider as directed: You may need to return to have your suprapubic catheter changed or removed. Write down your questions so you remember to ask them during your visits.     Empty your urine drainage bag: Empty your urine drainage bag when it is ½ to ? full, or every 8 hours. If you have a smaller leg bag, empty it every 3 to 4 hours. Do the following when you empty your urine drainage bag:  Hold the urine bag over a toilet or large container.    Remove the drain spout from its sleeve at the bottom of the urine bag. Do not touch the tip of the drain spout. Open the slide valve on the spout.   Let the urine flow out of the urine bag into the toilet or container. Do not let the drainage tube touch anything.   Clean the end of the drain spout with alcohol when the bag is empty. Ask which cleaning solution is best to use.    Close the slide valve and put the drain spout into its sleeve at the bottom of the urine bag. Write down how much urine was in your bag if you were asked to keep a record.    Prevent an infection:   Clean the stoma and skin around it daily. Wash your hands before and after cystostomy care. Put on a new pair of clean medical gloves.   Change your urine bag or clean reusable bags. Ask how often you need to change or clean your urine drainage bag. You may need to change your reusable bag at least once a week.   Keep the bag below your waist. This will prevent urine from flowing back into your bladder and causing an infection or other problems. Also, keep the tube free of kinks so the urine will  drain properly. Do not pull on the catheter. This can cause pain and bleeding and may cause the catheter to come out.    Contact [facility] at [phone] ([facility] PATIENTS: Contact [facility] at [phone]) ([facility] PATIENTS: Contact [facility] Radiology at [phone]) if any of the following occur:  You have a fever or chills.  Persistent nausea or vomiting  You have severe pain.  You have a burning pain in your stoma.   Your stoma is red, swollen, or draining pus.   You have blood in your urine.   You have questions or concerns about your condition or care.  Seek care immediately or call 911 if:   No urine is draining into the urine bag.

## 2024-07-28 NOTE — ASSESSMENT & PLAN NOTE
Recent Labs     07/27/24  0540 07/28/24  0443   CREATININE 2.32* 2.21*   EGFR 25 26       Estimated Creatinine Clearance: 28.4 mL/min (A) (by C-G formula based on SCr of 2.21 mg/dL (H)).    On admission Cr 2.31 (baseline Cr 2.0-2.2), GFR 25 (baseline GFR 26-30)    Plan:  Monitor BMP  Avoid hypoperfusion of the kidneys, minimize nephrotoxins

## 2024-07-28 NOTE — ASSESSMENT & PLAN NOTE
Presented with balanitis, urinary retention, and dysuria  Lomeli placed in ED  UA: nitrates, leukocytes, WBC, occasional bacteria  Urine microscopic - 2-4 RBC, Innumerable WBC, Occasional bacteria  History of enterococcus faecalis UTI in February with enterococcus faecalis bacteremia  Started Ceftriaxone on 7/27 7/28 - planned procedure for suprapubic catheter under anesthesia     Plan:  Continue Ceftriaxone  F/u urine cultures  Suprapubic catheter will be placed by urology today

## 2024-07-28 NOTE — PROGRESS NOTES
[facility]  Progress Note  Name: Mathew Lozano I  MRN: [id number]  Unit/Bed#: S -01 I Date of Admission: 7/27/2024   Date of Service: 7/28/2024 I Hospital Day: 1    Assessment & Plan   Urinary retention  Assessment & Plan  Patient has a 6 Kazakh Lomeli catheter in place.  Patient is producing 200 urine output.    Plan:  - Plan to get suprapubic catheter in place today    Balanitis  Assessment & Plan  The blister has busted and inflammation seems to be reducing compared to prior. Patient denied any pain as of today.    Plan:   UTI Continue Ceftriaxone  F/u urine cultures        * Complicated UTI (urinary tract infection)  Assessment & Plan  Presented with balanitis, urinary retention, and dysuria  Lomeli placed in ED  UA: nitrates, leukocytes, WBC, occasional bacteria  Urine microscopic - 2-4 RBC, Innumerable WBC, Occasional bacteria  History of enterococcus faecalis UTI in February with enterococcus faecalis bacteremia  Started Ceftriaxone on 7/27 7/28 - planned procedure for suprapubic catheter under anesthesia     Plan:  Continue Ceftriaxone  F/u urine cultures  Suprapubic catheter will be placed by urology today          Stage 3b chronic kidney disease (HCC)  Assessment & Plan  Recent Labs     07/27/24  0540 07/28/24  0443   CREATININE 2.32* 2.21*   EGFR 25 26       Estimated Creatinine Clearance: 28.4 mL/min (A) (by C-G formula based on SCr of 2.21 mg/dL (H)).    On admission Cr 2.31 (baseline Cr 2.0-2.2), GFR 25 (baseline GFR 26-30)    Plan:  Monitor BMP  Avoid hypoperfusion of the kidneys, minimize nephrotoxins      Essential hypertension  Assessment & Plan  Blood Pressure: 122/78, most likely elevated due to pain    Plan:  Continue Toprol-XL 25mg BID           VTE Pharmacologic Prophylaxis: VTE Score: 4 Moderate Risk (Score 3-4) - Pharmacological DVT Prophylaxis Ordered: heparin.    Mobility:   Basic Mobility Inpatient Raw Score: 24  -[facility] Goal: 8: Walk 250 feet or  more  JH-HLM Achieved: 7: Walk 25 feet or more  JH-HLM Goal achieved. Continue to encourage appropriate mobility.    Patient Centered Rounds: I performed bedside rounds with nursing staff today.  Discussions with Specialists or Other Care Team Provider: urology    Education and Discussions with Family / Patient: Updated  (wife) via phone.    Current Length of Stay: 1 day(s)  Current Patient Status: Inpatient   Discharge Plan: Anticipate discharge tomorrow to home.    Code Status: Level 1 - Full Code    Subjective:   Patient was seen by me today.  Patient was sitting comfortably in the chair.  Patient has no acute complaint or significant event overnight.  She does report his penile pain has reduced.  Patient is notified of his suprapubic catheter procedure today and patient has been on n.p.o. since midnight.     Objective:     Vitals:   Temp (24hrs), Av.3 °F (36.8 °C), Min:97.9 °F (36.6 °C), Max:98.5 °F (36.9 °C)    Temp:  [97.9 °F (36.6 °C)-98.5 °F (36.9 °C)] 98.5 °F (36.9 °C)  HR:  [60-67] 60  Resp:  [16-17] 16  BP: (122-135)/(73-81) 122/78  SpO2:  [93 %-96 %] 94 %  Body mass index is 27.93 kg/m².     Input and Output Summary (last 24 hours):     Intake/Output Summary (Last 24 hours) at 2024 1257  Last data filed at 2024 1101  Gross per 24 hour   Intake --   Output 200 ml   Net -200 ml       Physical Exam:   Physical Exam  Constitutional:       Appearance: Normal appearance.   Cardiovascular:      Rate and Rhythm: Normal rate and regular rhythm.      Pulses: Normal pulses.      Heart sounds: Normal heart sounds. No murmur heard.     No friction rub.   Pulmonary:      Effort: Pulmonary effort is normal. No respiratory distress.      Breath sounds: Normal breath sounds. No wheezing.   Abdominal:      General: Abdomen is flat. Bowel sounds are normal. There is no distension.      Tenderness: There is no abdominal tenderness. There is no right CVA tenderness or left CVA tenderness.    Genitourinary:     Comments: Erythematous at the tip of the penis.  The blister at the tip of the penis has ruptured.  Musculoskeletal:      Right lower leg: No edema.      Left lower leg: No edema.   Skin:     General: Skin is warm.      Capillary Refill: Capillary refill takes less than 2 seconds.   Neurological:      General: No focal deficit present.      Mental Status: He is alert and oriented to person, place, and time. Mental status is at baseline.   Psychiatric:         Mood and Affect: Mood normal.         Behavior: Behavior normal.         Thought Content: Thought content normal.         Judgment: Judgment normal.         Additional Data:     Labs:  Results from last 7 days   Lab Units 07/28/24  0443   WBC Thousand/uL 6.92   HEMOGLOBIN g/dL 11.8*   HEMATOCRIT % 36.3*   PLATELETS Thousands/uL 149   SEGS PCT % 50   LYMPHO PCT % 33   MONO PCT % 9   EOS PCT % 7*     Results from last 7 days   Lab Units 07/28/24  0443 07/27/24  0540   SODIUM mmol/L 139 137   POTASSIUM mmol/L 3.8 4.1   CHLORIDE mmol/L 106 105   CO2 mmol/L 25 25   BUN mg/dL 29* 27*   CREATININE mg/dL 2.21* 2.32*   ANION GAP mmol/L 8 7   CALCIUM mg/dL 9.1 9.2   ALBUMIN g/dL  --  4.2   TOTAL BILIRUBIN mg/dL  --  0.45   ALK PHOS U/L  --  55   ALT U/L  --  9   AST U/L  --  17   GLUCOSE RANDOM mg/dL 112 136     Results from last 7 days   Lab Units 07/28/24  0443   INR  1.05                   Lines/Drains:  Invasive Devices       Peripheral Intravenous Line  Duration             Peripheral IV 07/27/24 Dorsal (posterior);Right Hand 1 day              Drain  Duration             Urethral Catheter Double-lumen 10 Fr. 1 day                  Urinary Catheter:  Goal for removal:  Suprapubic catheter placement today by urology               Imaging: No pertinent imaging reviewed.    Recent Cultures (last 7 days):   Results from last 7 days   Lab Units 07/27/24  0540   URINE CULTURE  Culture too young- will reincubate       Last 24 Hours Medication List:    Current Facility-Administered Medications   Medication Dose Route Frequency Provider Last Rate    acetaminophen  650 mg Oral Q6H PRN Michelle Tiffanie Brouse, DO      cefTRIAXone  1,000 mg Intravenous Q24H Michelle Tiffanie Brouse, DO 1,000 mg (07/28/24 0604)    guaiFENesin  1,200 mg Oral Daily Michelle Tiffanie Brouse, DO      heparin (porcine)  5,000 Units Subcutaneous Q8H [facility] Michelle Tiffanie Brouse, DO      metoprolol succinate  25 mg Oral Q12H Michelle Tiffanie Brouse, DO      multivitamin stress formula  1 tablet Oral Daily Michelle Tiffanie Brouse, DO      pantoprazole  20 mg Oral Early Morning Michelel Tiffanie Brouse, DO       Facility-Administered Medications Ordered in Other Encounters   Medication Dose Route Frequency Provider Last Rate    propofol   Intravenous Continuous PRN Doris Cruz CRNA 70 mcg/kg/min (07/28/24 1245)    propofol   Intravenous PRN Doris Cruz CRNA      sodium chloride   Intravenous Continuous PRN Doris Cruz CRNA          Today, Patient Was Seen By: Halina Beasley MD    **Please Note: This note may have been constructed using a voice recognition system.**

## 2024-07-28 NOTE — ANESTHESIA POSTPROCEDURE EVALUATION
Post-Op Assessment Note    CV Status:  Stable  Pain Score: 0    Pain management: adequate       Mental Status:  Sleepy   Hydration Status:  Stable   PONV Controlled:  None   Airway Patency:  Patent     Post Op Vitals Reviewed: Yes    No anethesia notable event occurred.    Staff: CRNA               BP   141/80   Temp   98.7   Pulse  68   Resp   14   SpO2   96

## 2024-07-28 NOTE — ASSESSMENT & PLAN NOTE
Patient has a 6 Albanian Lomeli catheter in place.  Patient is producing 200 urine output.    Plan:  - Plan to get suprapubic catheter in place today

## 2024-07-28 NOTE — PROGRESS NOTES
Patient arrived to IR for suprapubic catheter    The procedure and risks were discussed with the patient.  All questions were answered. Informed consent was obtained.

## 2024-07-28 NOTE — PROGRESS NOTES
Teodoro is an 79-year-old male not previously known to our practice.  He had a bullous like blister at the meatus which was extremely painful.  The emergency room placed a 6 Bengali Lomeli catheter which does appear to be draining.  I discussed with the patient that the meatus is very tight.  There appears to be a healing blister which ruptured over top of the glans.  The patient stated he was actually urinating into the blister.  We discussed suprapubic tube in interventional radiology which is scheduled for the next 1 to 2 hours versus proceeding to the operating room for dilation of the meatus and insertion of a Lomeli catheter.  The patient is favoring the suprapubic tube which I feel is a reasonable option based on the amount of inflammation at the meatus.  He understands that after the suprapubic tube was placed he will then require interval cystoscopy in the operating room for dilation.  Please keep patient n.p.o. now for his scheduled procedure with IR today.

## 2024-07-28 NOTE — ANESTHESIA PREPROCEDURE EVALUATION
Procedure:  IR SUPRAPUBIC CATHETER PLACEMENT    Relevant Problems   CARDIO   (+) Essential hypertension      /RENAL   (+) ROSELYN (acute kidney injury) (HCC)   (+) Stage 3b chronic kidney disease (HCC)      PULMONARY   (+) YESENIA (obstructive sleep apnea)      Urinary   (+) Urinary retention      Hypertension    Asthma    Paroxysmal A-fib (HCC)    Paroxysmal SVT (supraventricular tachycardia)    CKD (chronic kidney disease)    Hypercholesteremia        Left Ventricle: Left ventricular cavity size is normal. Wall thickness  is mildly increased. The left ventricular ejection fraction is 60%.  Systolic function is normal. Wall motion is normal. Diastolic function is  mildly abnormal, consistent with grade I (abnormal) relaxation.    Right Ventricle: Systolic function is normal.    Left Atrium: The atrium is mildly dilated.    Mitral Valve: There is mild to moderate regurgitation.    Tricuspid Valve: There is mild regurgitation. The estimated right  ventricular systolic pressure is 39.00 mmHg.      Physical Exam    Airway    Mallampati score: II  TM Distance: >3 FB  Neck ROM: full     Dental       Cardiovascular  Cardiovascular exam normal    Pulmonary  Pulmonary exam normal     Other Findings        Anesthesia Plan  ASA Score- 3 Emergent    Anesthesia Type- IV sedation with anesthesia with ASA Monitors.         Additional Monitors:     Airway Plan:            Plan Factors-Exercise tolerance (METS): >4 METS.    Chart reviewed. EKG reviewed. Imaging results reviewed. Existing labs reviewed. Patient summary reviewed.                  Induction- intravenous.    Postoperative Plan-     Perioperative Resuscitation Plan - Level 1 - Full Code.       Informed Consent- Anesthetic plan and risks discussed with patient.  I personally reviewed this patient with the CRNA. Discussed and agreed on the Anesthesia Plan with the CRNA..

## 2024-07-29 ENCOUNTER — TELEPHONE (OUTPATIENT)
Dept: OTHER | Facility: HOSPITAL | Age: 79
End: 2024-07-29

## 2024-07-29 ENCOUNTER — HOME HEALTH ADMISSION (OUTPATIENT)
Dept: HOME HEALTH SERVICES | Facility: HOME HEALTHCARE | Age: 79
End: 2024-07-29
Payer: MEDICARE

## 2024-07-29 VITALS
RESPIRATION RATE: 17 BRPM | DIASTOLIC BLOOD PRESSURE: 74 MMHG | WEIGHT: 189.15 LBS | TEMPERATURE: 99 F | BODY MASS INDEX: 27.93 KG/M2 | OXYGEN SATURATION: 94 % | HEART RATE: 69 BPM | SYSTOLIC BLOOD PRESSURE: 122 MMHG

## 2024-07-29 LAB
ANION GAP SERPL CALCULATED.3IONS-SCNC: 9 MMOL/L (ref 4–13)
BACTERIA UR CULT: ABNORMAL
BASOPHILS # BLD AUTO: 0.05 THOUSANDS/ÂΜL (ref 0–0.1)
BASOPHILS NFR BLD AUTO: 1 % (ref 0–1)
BUN SERPL-MCNC: 29 MG/DL (ref 5–25)
CALCIUM SERPL-MCNC: 8.7 MG/DL (ref 8.4–10.2)
CHLORIDE SERPL-SCNC: 104 MMOL/L (ref 96–108)
CO2 SERPL-SCNC: 24 MMOL/L (ref 21–32)
CREAT SERPL-MCNC: 2.39 MG/DL (ref 0.6–1.3)
EOSINOPHIL # BLD AUTO: 0.27 THOUSAND/ÂΜL (ref 0–0.61)
EOSINOPHIL NFR BLD AUTO: 3 % (ref 0–6)
ERYTHROCYTE [DISTWIDTH] IN BLOOD BY AUTOMATED COUNT: 13.4 % (ref 11.6–15.1)
GFR SERPL CREATININE-BSD FRML MDRD: 24 ML/MIN/1.73SQ M
GLUCOSE SERPL-MCNC: 127 MG/DL (ref 65–140)
HCT VFR BLD AUTO: 38 % (ref 36.5–49.3)
HGB BLD-MCNC: 12.1 G/DL (ref 12–17)
IMM GRANULOCYTES # BLD AUTO: 0.03 THOUSAND/UL (ref 0–0.2)
IMM GRANULOCYTES NFR BLD AUTO: 0 % (ref 0–2)
LYMPHOCYTES # BLD AUTO: 1.27 THOUSANDS/ÂΜL (ref 0.6–4.47)
LYMPHOCYTES NFR BLD AUTO: 13 % (ref 14–44)
MCH RBC QN AUTO: 30.5 PG (ref 26.8–34.3)
MCHC RBC AUTO-ENTMCNC: 31.8 G/DL (ref 31.4–37.4)
MCV RBC AUTO: 96 FL (ref 82–98)
MONOCYTES # BLD AUTO: 0.75 THOUSAND/ÂΜL (ref 0.17–1.22)
MONOCYTES NFR BLD AUTO: 8 % (ref 4–12)
NEUTROPHILS # BLD AUTO: 7.58 THOUSANDS/ÂΜL (ref 1.85–7.62)
NEUTS SEG NFR BLD AUTO: 75 % (ref 43–75)
NRBC BLD AUTO-RTO: 0 /100 WBCS
PLATELET # BLD AUTO: 167 THOUSANDS/UL (ref 149–390)
PMV BLD AUTO: 10 FL (ref 8.9–12.7)
POTASSIUM SERPL-SCNC: 3.5 MMOL/L (ref 3.5–5.3)
RBC # BLD AUTO: 3.97 MILLION/UL (ref 3.88–5.62)
SODIUM SERPL-SCNC: 137 MMOL/L (ref 135–147)
WBC # BLD AUTO: 9.95 THOUSAND/UL (ref 4.31–10.16)

## 2024-07-29 PROCEDURE — 99232 SBSQ HOSP IP/OBS MODERATE 35: CPT | Performed by: UROLOGY

## 2024-07-29 PROCEDURE — 85025 COMPLETE CBC W/AUTO DIFF WBC: CPT

## 2024-07-29 PROCEDURE — 80048 BASIC METABOLIC PNL TOTAL CA: CPT

## 2024-07-29 PROCEDURE — 99239 HOSP IP/OBS DSCHRG MGMT >30: CPT | Performed by: INTERNAL MEDICINE

## 2024-07-29 RX ORDER — ACETAMINOPHEN 325 MG/1
650 TABLET ORAL EVERY 6 HOURS PRN
Qty: 120 TABLET | Refills: 0 | Status: SHIPPED | OUTPATIENT
Start: 2024-07-29 | End: 2024-08-28

## 2024-07-29 RX ORDER — CEFDINIR 300 MG/1
300 CAPSULE ORAL EVERY 12 HOURS SCHEDULED
Qty: 8 CAPSULE | Refills: 0 | Status: SHIPPED | OUTPATIENT
Start: 2024-07-30 | End: 2024-08-03

## 2024-07-29 RX ADMIN — PANTOPRAZOLE SODIUM 20 MG: 20 TABLET, DELAYED RELEASE ORAL at 06:13

## 2024-07-29 RX ADMIN — METOPROLOL SUCCINATE 25 MG: 25 TABLET, EXTENDED RELEASE ORAL at 06:13

## 2024-07-29 RX ADMIN — B-COMPLEX W/ C & FOLIC ACID TAB 1 TABLET: TAB at 08:26

## 2024-07-29 RX ADMIN — GUAIFENESIN 1200 MG: 600 TABLET ORAL at 08:26

## 2024-07-29 RX ADMIN — DEXTROSE 1000 MG: 50 INJECTION, SOLUTION INTRAVENOUS at 06:23

## 2024-07-29 RX ADMIN — HEPARIN SODIUM 5000 UNITS: 5000 INJECTION INTRAVENOUS; SUBCUTANEOUS at 06:13

## 2024-07-29 NOTE — DISCHARGE SUMMARY
[facility]  Discharge- Mathew Sloan Jr. 1945, 79 y.o. male MRN: [id number]  Unit/Bed#: S -01 Encounter: [id number]  Primary Care Provider: Nick Haskins DO   Date and time admitted to hospital: 7/27/2024  3:38 AM    Urinary retention  Assessment & Plan  Patient has suprapubic catheter in place. patient is producing 200 urine output.     Plan:  Urology will schedule to do interval cystoscopy for dilation after discharge    Balanitis  Assessment & Plan  The blister has busted and inflammation seems to be reducing compared to prior. Patient denied any pain as of today.    Plan:   Cefdinir 300 mg of 1 capsule by mouth every 12 hours for 4-day    * Complicated UTI (urinary tract infection)  Assessment & Plan  Presented with balanitis, urinary retention, and dysuria  Lomeli placed in ED  UA: nitrates, leukocytes, WBC, occasional bacteria  Urine microscopic - 2-4 RBC, Innumerable WBC, Occasional bacteria  History of enterococcus faecalis UTI in February with enterococcus faecalis bacteremia  Started Ceftriaxone on 7/27  Suprapubic catheter placed on 7/29      Patient reports 1 episode of some red color urine leaking from his urethral meatus last night. It has not happened this morning. Patient endorses feeling like the urethral meatus is slightly more red today compared to yesterday. He requests to stay in hospital to be observed one more night.   Urology plan to to interval cystoscopy in OR to dilate urethral meatus    Plan:  Start cefdinir 300 mg of 1 capsule by mouth every 12 hrs for 4-days  Suprapubic catheter placed by urology           Stage 3b chronic kidney disease (HCC)  Assessment & Plan  Recent Labs     07/27/24  0540 07/28/24  0443 07/29/24  0456   CREATININE 2.32* 2.21* 2.39*   EGFR 25 26 24       Estimated Creatinine Clearance: 26.3 mL/min (A) (by C-G formula based on SCr of 2.39 mg/dL (H)).    On admission Cr 2.31 (baseline Cr 2.0-2.2), GFR 25 (baseline GFR  26-30)    Plan:  Avoid hypoperfusion of the kidneys, minimize nephrotoxins      Essential hypertension  Assessment & Plan  Blood Pressure: 122/74    Plan:  Continue Toprol-XL 25mg BID      Medical Problems       Resolved Problems  Date Reviewed: 7/28/2024   None       Discharging Resident: Halina Beasley MD  Discharging Attending: Beverly Edmond MD  PCP: Nick Haskins DO  Admission Date:   Admission Orders (From admission, onward)       Ordered        07/27/24 0558  INPATIENT ADMISSION  Once                          Discharge Date: 07/29/24    Consultations During Hospital Stay:  Urology     Procedures Performed:   Suprapubic catheter placement    Significant Findings / Test Results:   None    Incidental Findings:   None    Test Results Pending at Discharge (will require follow up):  None     Outpatient Tests Requested:  Follow-up with urology to do planned cystoscopy for meatus dilation    Complications:  None    Reason for Admission: Blister at urethral meatus along with meatal stenosis     Hospital Course:   Mathew Sloan Jr. is a 79 y.o. male patient who originally presented to the hospital on 7/27/2024 due to bullous like blister at urethral meatus.  The blister ruptured in the ED.  Patient was retaining urine and a 6 Divehi Lomeli catheter was placed to drain.  Urology was consulted and planned for a suprapubic catheter.  Urine cultures were pending and patient was started on ceftriaxone on 7/27.  After suprapubic catheter placement, urology clear the patient and plan to schedule interval cystoscopy meatal dilation with close follow up outpatient.  Patient is stable and discharge on cefdinir 300 mg of 1 capsule by mouth every 12 hours for 4 days.    Please see above list of diagnoses and related plan for additional information.     Condition at Discharge: good    Discharge Day Visit / Exam:   Subjective:  Patient was seen by me at bedside this morning.  Patient reports 1 episode of some red color  urine leaking from his urethral meatus last night. It has not happened this morning. Patient endorses feeling like the urethral meatus is slightly red, but no pain this morning.  Patient is denying any urinary symptoms, nauseous, vomiting, abdominal pain, shortness of breath or chest pain.  Vitals: Blood Pressure: 122/74 (07/29/24 0729)  Pulse: 69 (07/29/24 0729)  Temperature: 99 °F (37.2 °C) (07/29/24 0729)  Temp Source: Oral (07/29/24 0729)  Respirations: 17 (07/29/24 0729)  Weight - Scale: 85.8 kg (189 lb 2.5 oz) (07/27/24 0343)  SpO2: 94 % (07/29/24 0729)  Exam:   Physical Exam  Constitutional:       Appearance: Normal appearance.   Cardiovascular:      Rate and Rhythm: Normal rate and regular rhythm.      Pulses: Normal pulses.      Heart sounds: Normal heart sounds. No murmur heard.     No gallop.   Pulmonary:      Effort: Pulmonary effort is normal. No respiratory distress.      Breath sounds: Normal breath sounds. No wheezing.   Abdominal:      General: Bowel sounds are normal. There is no distension.      Palpations: Abdomen is soft.      Tenderness: There is no abdominal tenderness. There is no right CVA tenderness or left CVA tenderness.   Genitourinary:     Comments: Ruptured blister at the urethral meatus.  Reduced erythematous.  Healing well.  Musculoskeletal:         General: Normal range of motion.      Right lower leg: No edema.      Left lower leg: No edema.   Skin:     General: Skin is warm.      Capillary Refill: Capillary refill takes less than 2 seconds.   Neurological:      General: No focal deficit present.      Mental Status: He is alert and oriented to person, place, and time. Mental status is at baseline.   Psychiatric:         Mood and Affect: Mood normal.         Behavior: Behavior normal.         Thought Content: Thought content normal.         Judgment: Judgment normal.          Discussion with Family: Updated  (wife) via phone.    Discharge instructions/Information to  patient and family:   See after visit summary for information provided to patient and family.      Provisions for Follow-Up Care:  See after visit summary for information related to follow-up care and any pertinent home health orders.      Mobility at time of Discharge:   Basic Mobility Inpatient Raw Score: 21  JH-HLM Goal: 6: Walk 10 steps or more  JH-HLM Achieved: 6: Walk 10 steps or more  HLM Goal achieved. Continue to encourage appropriate mobility.     Disposition:   Home    Planned Readmission: None    Discharge Medications:  See after visit summary for reconciled discharge medications provided to patient and/or family.      **Please Note: This note may have been constructed using a voice recognition system**

## 2024-07-29 NOTE — CASE MANAGEMENT
Case Management Assessment & Discharge Planning Note    Patient name Mathew Sloan [facility]  Location S /S -01 MRN [id number]  : 1945 Date 2024       Current Admission Date: 2024  Current Admission Diagnosis:Complicated UTI (urinary tract infection)   Patient Active Problem List    Diagnosis Date Noted Date Diagnosed    Balanitis 2024     Urinary retention 2024     Bacteremia due to Staphylococcus 2024     Bacteremia due to Enterococcus 2024     Complicated UTI (urinary tract infection) 2024     Bandemia 2024     Stage 3b chronic kidney disease (HCC) 2024     Benign prostatic hyperplasia with nocturia 2024     Essential hypertension 2021     ROSELYN (acute kidney injury) (HCC) 2021     Elevated glucose level 2021     Palpitations 2021     YESENIA (obstructive sleep apnea) 2021     Closed nondisplaced fracture of greater tuberosity of right humerus 2019     Closed fracture of rim of glenoid fossa of right scapula 2019       LOS (days): 2  Geometric Mean LOS (GMLOS) (days): 3.5  Days to GMLOS:1.2     OBJECTIVE:    Risk of Unplanned Readmission Score: 11.61         Current admission status: Inpatient       Preferred Pharmacy:   [facility] [address] [place] [address]  Phone: [phone] Fax: [phone]    Primary Care Provider: Nick Haskins DO    Primary Insurance: MEDICARE  Secondary Insurance: COMMERCIAL MISCELLANEOUS    ASSESSMENT:  Active Health Care Proxies    There are no active Health Care Proxies on file.                 Readmission Root Cause  30 Day Readmission: No    Patient Information  Admitted from:: Home  Mental Status: Alert  During Assessment patient was accompanied by: Not accompanied during assessment  Assessment information provided by:: Patient  Primary Caregiver: Self  Support Systems: Spouse/significant other,  Family members  County of Residence: [place]  [facility] do you live in?: [place]  Home entry access options. Select all that apply.: Stairs  Number of steps to enter home.: 5  Type of Current Residence: 2 story home  Upon entering residence, is there a bedroom on the main floor (no further steps)?: Yes  Upon entering residence, is there a bathroom on the main floor (no further steps)?: Yes  Living Arrangements: Lives w/ Spouse/significant other  Is patient a ?: No    Activities of Daily Living Prior to Admission  Functional Status: Independent  Completes ADLs independently?: Yes  Ambulates independently?: Yes  Does patient use assisted devices?: No  Does patient currently own DME?: Yes  What DME does the patient currently own?: Walker  Does patient have a history of Outpatient Therapy (PT/OT)?: Yes  Does the patient have a history of Short-Term Rehab?: No  Does patient have a history of HHC?: No  Does patient currently have HHC?: No         Patient Information Continued  Income Source: Pension/skilled nursing  Does patient have prescription coverage?: Yes  Does patient receive dialysis treatments?: No  Does patient have a history of substance abuse?: No  Does patient have a history of Mental Health Diagnosis?: No         Means of Transportation  Means of Transport to Appts:: Drives Self      Social Determinants of Health (SDOH)      Flowsheet Row Most Recent Value   Housing Stability    In the last 12 months, was there a time when you were not able to pay the mortgage or rent on time? N   In the past 12 months, how many times have you moved where you were living? 0   At any time in the past 12 months, were you homeless or living in a shelter (including now)? N   Transportation Needs    In the past 12 months, has lack of transportation kept you from medical appointments or from getting medications? no   In the past 12 months, has lack of transportation kept you from meetings, work, or from getting things needed  for daily living? No   Food Insecurity    Within the past 12 months, you worried that your food would run out before you got the money to buy more. Never true   Within the past 12 months, the food you bought just didn't last and you didn't have money to get more. Never true   Utilities    In the past 12 months has the electric, gas, oil, or water company threatened to shut off services in your home? No            DISCHARGE DETAILS:    Discharge planning discussed with:: Patient  Freedom of Choice: Yes  Comments - Freedom of Choice: Reviewed HHC at [facility] contacted family/caregiver?: No- see comments (Patient is alert and oriented - Declined  outreach)  Were Treatment Team discharge recommendations reviewed with patient/caregiver?: Yes  Did patient/caregiver verbalize understanding of patient care needs?: Yes  Were patient/caregiver advised of the risks associated with not following Treatment Team discharge recommendations?: Yes    Contacts  Patient Contacts: Patient  Relationship to Patient:: Other (Comment)  Contact Method: In Person  Reason/Outcome: Discharge Planning, Referral, Emergency Contact, Continuity of Care    Requested Home Health Care         Is the patient interested in HHC at discharge?: Yes  Home Health Discipline requested:: Nursing, Physical Therapy  Home Health Agency Name:: [facility]  Home Health Services Needed:: Evaluate Functional Status and Safety, Strengthening/Theraputic Exercises to Improve Function, Urinary Incontinence Catheter Management  Homebound Criteria Met:: Requires the Assistance of Another Person for Safe Ambulation or to Leave the Home  Supporting Clincal Findings:: Limited Endurance, Fatigues Easliy in Short Distances    DME Referral Provided  Referral made for DME?: No    Other Referral/Resources/Interventions Provided:  Interventions: HHC         Treatment Team Recommendation: Home with Home Health Care  Discharge Destination Plan:: Home with Home Health  Care  Transport at Discharge : Family                                         CM met with patient at bedside. CM name and role reviewed. CM assessment completed and charted above.    CM discussed making a [facility] referral for SN and PT at [place].  Patient was in agreement with this and his preference would be [facility] if available.    A blanket referral was made in [facility] and [facility] was able to accept patient on services.  They were reserved and their contact information was placed on the AVS.    Patient was updated and he stated that he wife would be in to provide transportation home.    CM reviewed the availability of treatment team to discuss questions or concerns patient and/or family may have regarding  understanding medications and recognizing signs and symptoms once discharged.  CM also encouraged patient to follow up with all recommended appointments after discharge. Patient advised of importance for patient and family to participate in managing patient's medical well being.

## 2024-07-29 NOTE — PROGRESS NOTES
[facility]  Progress Note  Name: Mathew Lozano I  MRN: [id number]  Unit/Bed#: S -01 I Date of Admission: 7/27/2024   Date of Service: 7/29/2024 I Hospital Day: 2    Assessment & Plan   Urinary retention  Assessment & Plan  Acute urinary retention  Meatal stenosis  Bullous blistering rash of the glans no rash elsewhere on the body; does not seem typical of balanitis; no recent illness/exposure to explain a dermatitis. he is being treated with IV abx while awaiting urine cultures as well  Sp SPT placement yesterday  VSS, afebrile   No leukocytosis  Stable for discharge  Will arrange outpatient urologic follow up               Subjective:   HPI: Seen at bedside.  Mild incisional pain around SPT    Review of Systems   Constitutional:  Negative for chills and fever.   Respiratory:  Negative for cough and shortness of breath.    Cardiovascular:  Negative for chest pain and palpitations.   Gastrointestinal:  Negative for abdominal pain and vomiting.   Genitourinary:  Negative for dysuria and hematuria.   Musculoskeletal:  Negative for arthralgias and back pain.   Skin:  Negative for color change and rash.   Neurological:  Negative for seizures and syncope.   All other systems reviewed and are negative.      Objective:    Vitals: Blood pressure 122/74, pulse 69, temperature 99 °F (37.2 °C), temperature source Oral, resp. rate 17, weight 85.8 kg (189 lb 2.5 oz), SpO2 94%.,Body mass index is 27.93 kg/m².    Physical Exam  Constitutional:       General: He is not in acute distress.     Appearance: Normal appearance. He is normal weight. He is not ill-appearing or toxic-appearing.   HENT:      Head: Normocephalic and atraumatic.      Right Ear: External ear normal.      Left Ear: External ear normal.      Nose: Nose normal.      Mouth/Throat:      Mouth: Mucous membranes are moist.   Eyes:      General: No scleral icterus.     Conjunctiva/sclera: Conjunctivae normal.   Cardiovascular:       Rate and Rhythm: Normal rate.      Pulses: Normal pulses.   Pulmonary:      Effort: Pulmonary effort is normal.   Abdominal:      General: Abdomen is flat. There is no distension.      Palpations: Abdomen is soft.      Tenderness: There is no abdominal tenderness. There is no guarding.      Comments: SPT draining clear yellow urine   Genitourinary:     Comments: Circumcised. Mild erythema and swelling of glans   Musculoskeletal:         General: Normal range of motion.      Cervical back: Normal range of motion.   Skin:     General: Skin is warm.      Findings: No rash.   Neurological:      General: No focal deficit present.      Mental Status: He is alert and oriented to person, place, and time. Mental status is at baseline.   Psychiatric:         Mood and Affect: Mood normal.         Behavior: Behavior normal.         Thought Content: Thought content normal.         Judgment: Judgment normal.             Labs:  Recent Labs     07/27/24  0540 07/28/24  0443 07/29/24  0456   WBC 9.01 6.92 9.95       Recent Labs     07/27/24  0540 07/28/24  0443 07/29/24  0456   HGB 12.1 11.8* 12.1     Recent Labs     07/27/24  0540 07/28/24  0443 07/29/24  0456   HCT 37.6 36.3* 38.0     Recent Labs     07/27/24  0540 07/28/24  0443 07/29/24  0456   CREATININE 2.32* 2.21* 2.39*         History:    Past Medical History:   Diagnosis Date    Asthma     CKD (chronic kidney disease)     Hypercholesteremia     Hypertension     Paroxysmal A-fib (HCC)     Paroxysmal SVT (supraventricular tachycardia)      Social History     Socioeconomic History    Marital status: /Civil Union     Spouse name: None    Number of children: None    Years of education: None    Highest education level: None   Occupational History    None   Tobacco Use    Smoking status: Never    Smokeless tobacco: Never   Vaping Use    Vaping status: Never Used   Substance and Sexual Activity    Alcohol use: Yes     Comment: social    Drug use: Never    Sexual activity:  Never   Other Topics Concern    None   Social History Narrative    None     Social Determinants of Health     Financial Resource Strain: Not on file   Food Insecurity: No Food Insecurity (7/27/2024)    Hunger Vital Sign     Worried About Running Out of Food in the Last Year: Never true     Ran Out of Food in the Last Year: Never true   Transportation Needs: No Transportation Needs (7/27/2024)    PRAPARE - Transportation     Lack of Transportation (Medical): No     Lack of Transportation (Non-Medical): No   Physical Activity: Not on file   Stress: Not on file   Social Connections: Not on file   Intimate Partner Violence: Not on file   Housing Stability: Low Risk  (7/27/2024)    Housing Stability Vital Sign     Unable to Pay for Housing in the Last Year: No     Number of Times Moved in the Last Year: 0     Homeless in the Last Year: No     Past Surgical History:   Procedure Laterality Date    CYST REMOVAL      IR TUNNELED CENTRAL LINE PLACEMENT  2/21/2024    TONSILLECTOMY       Family History   Problem Relation Age of Onset    Diabetes Mother     No Known Problems Father        Jhoana Ferris PA-C  Date: 7/29/2024 Time: 12:05 PM      DISPLAY PLAN FREE TEXT DISPLAY PLAN FREE TEXT DISPLAY PLAN FREE TEXT DISPLAY PLAN FREE TEXT DISPLAY PLAN FREE TEXT DISPLAY PLAN FREE TEXT DISPLAY PLAN FREE TEXT DISPLAY PLAN FREE TEXT DISPLAY PLAN FREE TEXT DISPLAY PLAN FREE TEXT DISPLAY PLAN FREE TEXT

## 2024-07-29 NOTE — ASSESSMENT & PLAN NOTE
The blister has busted and inflammation seems to be reducing compared to prior. Patient denied any pain as of today.    Plan:   Cefdinir 300 mg of 1 capsule by mouth every 12 hours for 4-day

## 2024-07-29 NOTE — TELEPHONE ENCOUNTER
Teodoro is an 79-year-old seen in urologic consultation at [place] for blister at urethral meatus along with small tight meatal.  He underwent SPT for urinary diversion.  Patient will need outpatient follow-up and cystoscopy in the OR for dilation.

## 2024-07-29 NOTE — ASSESSMENT & PLAN NOTE
Recent Labs     07/27/24  0540 07/28/24  0443 07/29/24  0456   CREATININE 2.32* 2.21* 2.39*   EGFR 25 26 24       Estimated Creatinine Clearance: 26.3 mL/min (A) (by C-G formula based on SCr of 2.39 mg/dL (H)).    On admission Cr 2.31 (baseline Cr 2.0-2.2), GFR 25 (baseline GFR 26-30)    Plan:  Avoid hypoperfusion of the kidneys, minimize nephrotoxins

## 2024-07-29 NOTE — PLAN OF CARE
Problem: PAIN - ADULT  Goal: Verbalizes/displays adequate comfort level or baseline comfort level  Description: Interventions:  - Encourage patient to monitor pain and request assistance  - Assess pain using appropriate pain scale  - Administer analgesics based on type and severity of pain and evaluate response  - Implement non-pharmacological measures as appropriate and evaluate response  - Consider cultural and social influences on pain and pain management  - Notify physician/advanced practitioner if interventions unsuccessful or patient reports new pain  Outcome: Progressing     Problem: INFECTION - ADULT  Goal: Absence or prevention of progression during hospitalization  Description: INTERVENTIONS:  - Assess and monitor for signs and symptoms of infection  - Monitor lab/diagnostic results  - Monitor all insertion sites, i.e. indwelling lines, tubes, and drains  - Monitor endotracheal if appropriate and nasal secretions for changes in amount and color  - [place] appropriate cooling/warming therapies per order  - Administer medications as ordered  - Instruct and encourage patient and family to use good hand hygiene technique  - Identify and instruct in appropriate isolation precautions for identified infection/condition  Outcome: Progressing  Goal: Absence of fever/infection during neutropenic period  Description: INTERVENTIONS:  - Monitor WBC    Outcome: Progressing     Problem: SAFETY ADULT  Goal: Patient will remain free of falls  Description: INTERVENTIONS:  - Educate patient/family on patient safety including physical limitations  - Instruct patient to call for assistance with activity   - Consult OT/PT to assist with strengthening/mobility   - Keep Call bell within reach  - Keep bed low and locked with side rails adjusted as appropriate  - Keep care items and personal belongings within reach  - Initiate and maintain comfort rounds  - Make Fall Risk Sign visible to staff  - Apply yellow socks and bracelet  for high fall risk patients  - Consider moving patient to room near nurses station  Outcome: Progressing  Goal: Maintain or return to baseline ADL function  Description: INTERVENTIONS:  -  Assess patient's ability to carry out ADLs; assess patient's baseline for ADL function and identify physical deficits which impact ability to perform ADLs (bathing, care of mouth/teeth, toileting, grooming, dressing, etc.)  - Assess/evaluate cause of self-care deficits   - Assess range of motion  - Assess patient's mobility; develop plan if impaired  - Assess patient's need for assistive devices and provide as appropriate  - Encourage maximum independence but intervene and supervise when necessary  - Involve family in performance of ADLs  - Assess for home care needs following discharge   - Consider OT consult to assist with ADL evaluation and planning for discharge  - Provide patient education as appropriate  Outcome: Progressing  Goal: Maintains/Returns to pre admission functional level  Description: INTERVENTIONS:  - Perform AM-PAC 6 Click Basic Mobility/ Daily Activity assessment daily.  - Set and communicate daily mobility goal to care team and patient/family/caregiver.   - Collaborate with rehabilitation services on mobility goals if consulted  - Out of bed for toileting  - Record patient progress and toleration of activity level   Outcome: Progressing     Problem: DISCHARGE PLANNING  Goal: Discharge to home or other facility with appropriate resources  Description: INTERVENTIONS:  - Identify barriers to discharge w/patient and caregiver  - Arrange for needed discharge resources and transportation as appropriate  - Identify discharge learning needs (meds, wound care, etc.)  - Arrange for interpretive services to assist at discharge as needed  - Refer to Case Management Department for coordinating discharge planning if the patient needs post-hospital services based on physician/advanced practitioner order or complex needs  related to functional status, cognitive ability, or social support system  Outcome: Progressing     Problem: Knowledge Deficit  Goal: Patient/family/caregiver demonstrates understanding of disease process, treatment plan, medications, and discharge instructions  Description: Complete learning assessment and assess knowledge base.  Interventions:  - Provide teaching at level of understanding  - Provide teaching via preferred learning methods  Outcome: Progressing     Problem: Prexisting or High Potential for Compromised Skin Integrity  Goal: Skin integrity is maintained or improved  Description: INTERVENTIONS:  - Identify patients at risk for skin breakdown  - Assess and monitor skin integrity  - Assess and monitor nutrition and hydration status  - Monitor labs   - Assess for incontinence   - Turn and reposition patient  - Assist with mobility/ambulation  - Relieve pressure over bony prominences  - Avoid friction and shearing  - Provide appropriate hygiene as needed including keeping skin clean and dry  - Evaluate need for skin moisturizer/barrier cream  - Collaborate with interdisciplinary team   - Patient/family teaching  - Consider wound care consult   Outcome: Progressing     Problem: GENITOURINARY - ADULT  Goal: Absence of urinary retention  Description: INTERVENTIONS:  - Assess patient’s ability to void and empty bladder  - Monitor I/O  - Bladder scan as needed  - Discuss with physician/AP medications to alleviate retention as needed  - Discuss catheterization for long term situations as appropriate  Outcome: Progressing  Goal: Urinary catheter remains patent  Description: INTERVENTIONS:  - Assess patency of urinary catheter  - If patient has a chronic wiley, consider changing catheter if non-functioning  - Follow guidelines for intermittent irrigation of non-functioning urinary catheter  Outcome: Progressing

## 2024-07-29 NOTE — DISCHARGE INSTR - AVS FIRST PAGE
Dear Mathew Sloan Jr.,     It was our pleasure to care for you here at [facility].  It is our hope that we were always able to exceed the expected standards for your care during your stay.  You were hospitalized due to a bullous like blister at the meatus.  You were cared for on the fourth floor by Halina Beasley MD under the service of Beverly Edmond MD with the [facility] who covers for your primary care physician (PCP), Nick Haskins DO, while you were hospitalized.  If you have any questions or concerns related to this hospitalization, you may contact us at .  For follow up as well as any medication refills, we recommend that you follow up with your primary care physician.  A registered nurse will reach out to you by phone within a few days after your discharge to answer any additional questions that you may have after going home.  However, at this time we provide for you here, the most important instructions / recommendations at discharge:     Notable Medication Adjustments -   Start taking cefdinir 300 mg of 1 capsule by mouth every 12 hours for 4-day starting July 30, 2024  Testing Required after Discharge -   Follow-up with urology for planned cystoscopy within 2 days  ** Please contact your PCP to request testing orders for any of the testing recommended here **  Important follow up information -   Follow up with urology, referral provided     Other Instructions -   If you have fever, chill, shortness of breath, urinary symptoms or red color urine please return to ED  Please review this entire after visit summary as additional general instructions including medication list, appointments, activity, diet, any pertinent wound care, and other additional recommendations from your care team that may be provided for you.      Sincerely,     Halina Beasley MD

## 2024-07-29 NOTE — ASSESSMENT & PLAN NOTE
Patient has suprapubic catheter in place. patient is producing 200 urine output.     Plan:  Urology will schedule to do interval cystoscopy for dilation after discharge

## 2024-07-29 NOTE — PLAN OF CARE
Problem: PAIN - ADULT  Goal: Verbalizes/displays adequate comfort level or baseline comfort level  Description: Interventions:  - Encourage patient to monitor pain and request assistance  - Assess pain using appropriate pain scale  - Administer analgesics based on type and severity of pain and evaluate response  - Implement non-pharmacological measures as appropriate and evaluate response  - Consider cultural and social influences on pain and pain management  - Notify physician/advanced practitioner if interventions unsuccessful or patient reports new pain  Outcome: Progressing     Problem: INFECTION - ADULT  Goal: Absence or prevention of progression during hospitalization  Description: INTERVENTIONS:  - Assess and monitor for signs and symptoms of infection  - Monitor lab/diagnostic results  - Monitor all insertion sites, i.e. indwelling lines, tubes, and drains  - Monitor endotracheal if appropriate and nasal secretions for changes in amount and color  - [place] appropriate cooling/warming therapies per order  - Administer medications as ordered  - Instruct and encourage patient and family to use good hand hygiene technique  - Identify and instruct in appropriate isolation precautions for identified infection/condition  Outcome: Progressing  Goal: Absence of fever/infection during neutropenic period  Description: INTERVENTIONS:  - Monitor WBC    Outcome: Progressing     Problem: SAFETY ADULT  Goal: Patient will remain free of falls  Description: INTERVENTIONS:  - Educate patient/family on patient safety including physical limitations  - Instruct patient to call for assistance with activity   - Consult OT/PT to assist with strengthening/mobility   - Keep Call bell within reach  - Keep bed low and locked with side rails adjusted as appropriate  - Keep care items and personal belongings within reach  - Initiate and maintain comfort rounds  - Make Fall Risk Sign visible to staff  - Offer Toileting every 2 Hours,  in advance of need  - Initiate/Maintain bed alarm  - Obtain necessary fall risk management equipment  - Apply yellow socks and bracelet for high fall risk patients  - Consider moving patient to room near nurses station  Outcome: Progressing  Goal: Maintain or return to baseline ADL function  Description: INTERVENTIONS:  -  Assess patient's ability to carry out ADLs; assess patient's baseline for ADL function and identify physical deficits which impact ability to perform ADLs (bathing, care of mouth/teeth, toileting, grooming, dressing, etc.)  - Assess/evaluate cause of self-care deficits   - Assess range of motion  - Assess patient's mobility; develop plan if impaired  - Assess patient's need for assistive devices and provide as appropriate  - Encourage maximum independence but intervene and supervise when necessary  - Involve family in performance of ADLs  - Assess for home care needs following discharge   - Consider OT consult to assist with ADL evaluation and planning for discharge  - Provide patient education as appropriate  Outcome: Progressing  Goal: Maintains/Returns to pre admission functional level  Description: INTERVENTIONS:  - Perform AM-PAC 6 Click Basic Mobility/ Daily Activity assessment daily.  - Set and communicate daily mobility goal to care team and patient/family/caregiver.   - Collaborate with rehabilitation services on mobility goals if consulted  - Perform Range of Motion 3 times a day.  - Reposition patient every 2 hours.  - Dangle patient 3 times a day  - Stand patient 3 times a day  - Ambulate patient 3 times a day  - Out of bed to chair 3 times a day   - Out of bed for meals 3 times a day  - Out of bed for toileting  - Record patient progress and toleration of activity level   Outcome: Progressing     Problem: DISCHARGE PLANNING  Goal: Discharge to home or other facility with appropriate resources  Description: INTERVENTIONS:  - Identify barriers to discharge w/patient and caregiver  -  Arrange for needed discharge resources and transportation as appropriate  - Identify discharge learning needs (meds, wound care, etc.)  - Arrange for interpretive services to assist at discharge as needed  - Refer to Case Management Department for coordinating discharge planning if the patient needs post-hospital services based on physician/advanced practitioner order or complex needs related to functional status, cognitive ability, or social support system  Outcome: Progressing     Problem: Knowledge Deficit  Goal: Patient/family/caregiver demonstrates understanding of disease process, treatment plan, medications, and discharge instructions  Description: Complete learning assessment and assess knowledge base.  Interventions:  - Provide teaching at level of understanding  - Provide teaching via preferred learning methods  Outcome: Progressing     Problem: Prexisting or High Potential for Compromised Skin Integrity  Goal: Skin integrity is maintained or improved  Description: INTERVENTIONS:  - Identify patients at risk for skin breakdown  - Assess and monitor skin integrity  - Assess and monitor nutrition and hydration status  - Monitor labs   - Assess for incontinence   - Turn and reposition patient  - Assist with mobility/ambulation  - Relieve pressure over bony prominences  - Avoid friction and shearing  - Provide appropriate hygiene as needed including keeping skin clean and dry  - Evaluate need for skin moisturizer/barrier cream  - Collaborate with interdisciplinary team   - Patient/family teaching  - Consider wound care consult   Outcome: Progressing     Problem: GENITOURINARY - ADULT  Goal: Absence of urinary retention  Description: INTERVENTIONS:  - Assess patient’s ability to void and empty bladder  - Monitor I/O  - Bladder scan as needed  - Discuss with physician/AP medications to alleviate retention as needed  - Discuss catheterization for long term situations as appropriate  Outcome: Progressing  Goal:  Urinary catheter remains patent  Description: INTERVENTIONS:  - Assess patency of urinary catheter  - If patient has a chronic wiley, consider changing catheter if non-functioning  - Follow guidelines for intermittent irrigation of non-functioning urinary catheter  Outcome: Progressing

## 2024-07-29 NOTE — ASSESSMENT & PLAN NOTE
Presented with balanitis, urinary retention, and dysuria  Lmoeli placed in ED  UA: nitrates, leukocytes, WBC, occasional bacteria  Urine microscopic - 2-4 RBC, Innumerable WBC, Occasional bacteria  History of enterococcus faecalis UTI in February with enterococcus faecalis bacteremia  Started Ceftriaxone on 7/27  Suprapubic catheter placed on 7/29      Patient reports 1 episode of some red color urine leaking from his urethral meatus last night. It has not happened this morning. Patient endorses feeling like the urethral meatus is slightly more red today compared to yesterday. He requests to stay in hospital to be observed one more night.   Urology plan to to interval cystoscopy in OR to dilate urethral meatus    Plan:  Start cefdinir 300 mg of 1 capsule by mouth every 12 hrs for 4-days  Suprapubic catheter placed by urology

## 2024-07-30 NOTE — TELEPHONE ENCOUNTER
Patient called today in attempt to schedule appointment.    Please review for scheduling recommendations.    Call back [phone]

## 2024-07-31 ENCOUNTER — HOME CARE VISIT (OUTPATIENT)
Dept: HOME HEALTH SERVICES | Facility: HOME HEALTHCARE | Age: 79
End: 2024-07-31

## 2024-08-01 ENCOUNTER — HOME CARE VISIT (OUTPATIENT)
Dept: HOME HEALTH SERVICES | Facility: HOME HEALTHCARE | Age: 79
End: 2024-08-01
Payer: MEDICARE

## 2024-08-01 PROCEDURE — 10330081 VN NO-PAY CLAIM PROCEDURE

## 2024-08-01 PROCEDURE — G0299 HHS/HOSPICE OF RN EA 15 MIN: HCPCS

## 2024-08-01 PROCEDURE — 400013 VN SOC

## 2024-08-01 NOTE — Clinical Note
Medication discrepancies or Major drug interactions  None  Abnormal clinical findings None  This report is informational only, no response is needed  [facility] has Admitted your patient to Home Health service with the following disciplines: SN  Patient stated goals of care  Potential barriers to goal achievement  Pt not drinking adequate fluids  Primary focus of home health care:GI/  Anticipated visit pattern and next visit date 8/6   1 week 1 then 2 weeks x2; then 1x weekly  Thank you for allowing us to participate in the care of your patient.      Jenise Del Valle RN

## 2024-08-01 NOTE — HOME HEALTH
Pt is an 79 yr old male recently hospitalized with Balantis, urinary retention, UTI.  Pt currently has a suprapubic catheter patent for darker yellow urine. Tip of penis with outer layer of skin removed due to a evacuated water blister. Area is very tender to patient. No wound care. Open to air. Pt reports intermittent burning pain to the tip of the penis; and pressure pain in his bladder when he has to urinate. Pt does not want to drink alot due to the pressure pain.  Sn encouraged pt to drink more to help flush kidneys and promoter easier, more frequent urination. Pt lives at home with his supportive wife. Sn instructed patient to keep the tip of the penis clean and dry, and monitor for s.s of infection. POC; SN 2x weekly

## 2024-08-02 ENCOUNTER — PROCEDURE VISIT (OUTPATIENT)
Dept: UROLOGY | Facility: CLINIC | Age: 79
End: 2024-08-02
Payer: MEDICARE

## 2024-08-02 VITALS
HEIGHT: 69 IN | OXYGEN SATURATION: 98 % | DIASTOLIC BLOOD PRESSURE: 70 MMHG | HEART RATE: 78 BPM | WEIGHT: 184 LBS | SYSTOLIC BLOOD PRESSURE: 120 MMHG | BODY MASS INDEX: 27.25 KG/M2

## 2024-08-02 DIAGNOSIS — N48.1 BALANITIS: Primary | ICD-10-CM

## 2024-08-02 DIAGNOSIS — R33.9 URINARY RETENTION: ICD-10-CM

## 2024-08-02 DIAGNOSIS — R31.9 URINARY TRACT INFECTION WITH HEMATURIA, SITE UNSPECIFIED: ICD-10-CM

## 2024-08-02 DIAGNOSIS — N39.0 UTI (URINARY TRACT INFECTION): ICD-10-CM

## 2024-08-02 DIAGNOSIS — Z93.59 SUPRAPUBIC CATHETER (HCC): ICD-10-CM

## 2024-08-02 DIAGNOSIS — N39.0 URINARY TRACT INFECTION WITH HEMATURIA, SITE UNSPECIFIED: ICD-10-CM

## 2024-08-02 PROCEDURE — 99213 OFFICE O/P EST LOW 20 MIN: CPT | Performed by: PHYSICIAN ASSISTANT

## 2024-08-02 RX ORDER — NYSTATIN 100000 U/G
CREAM TOPICAL 2 TIMES DAILY
Qty: 30 G | Refills: 1 | Status: SHIPPED | OUTPATIENT
Start: 2024-08-02

## 2024-08-02 NOTE — PROGRESS NOTES
UROLOGY PROGRESS NOTE   Patient Identifiers: Mathew Sloan (MRN [id number])  Date of Service: 8/2/2024    Subjective:   79-year-old man seen July 27 with urinary retention.  He had a large bullous rash on his glans and meatal stenosis.  There are drained and a's very tiny catheter was able to be placed in his bladder.  Subsequently he had a suprapubic catheter placed by interventional radiology and he is here for follow-up.  Suprapubic catheter is draining clear yellow.  His urine culture grew greater than 100,000 coag negative staph.  He was hospitalized in February for a urinary tract infection also with Staphylococcus in his urine and bacteremia.  Only imaging was a kidney and bladder ultrasound that was unremarkable.  He denies ever seeing a urologist in the past.  He denies any family history of prostate bladder or kidney disease.    Reason for visit: Urinary retention, meatal stenosis,    Objective:     VITALS:    Vitals:    08/02/24 0846   BP: 120/70   Pulse: 78   SpO2: 98%     AUA SYMPTOM SCORE      Flowsheet Row Most Recent Value   AUA SYMPTOM SCORE    How often have you had a sensation of not emptying your bladder completely after you finished urinating? 5 (P)     How often have you had to urinate again less than two hours after you finished urinating? 5 (P)     How often have you found you stopped and started again several times when you urinate? 5 (P)     How often have you found it difficult to postpone urination? 5 (P)     How often have you had a weak urinary stream? 5 (P)     How often have you had to push or strain to begin urination? 5 (P)     How many times did you most typically get up to urinate from the time you went to bed at night until the time you got up in the morning? 5 (P)     Quality of Life: If you were to spend the rest of your life with your urinary condition just the way it is now, how would you feel about that? 6 (P)     AUA SYMPTOM SCORE 35 (P)                LABS:  Lab Results  "  Component Value Date    HGB 12.1 07/29/2024    HCT 38.0 07/29/2024    WBC 9.95 07/29/2024     07/29/2024   ]    Lab Results   Component Value Date    K 3.5 07/29/2024     07/29/2024    CO2 24 07/29/2024    BUN 29 (H) 07/29/2024    CREATININE 2.39 (H) 07/29/2024    CALCIUM 8.7 07/29/2024   ]        INPATIENT MEDS:    Current Outpatient Medications:     acetaminophen (TYLENOL) 325 mg tablet, Take 2 tablets (650 mg total) by mouth every 6 (six) hours as needed for mild pain, fever or headaches, Disp: 120 tablet, Rfl: 0    Apoaequorin (PREVAGEN PO), Take by mouth in the morning, Disp: , Rfl:     aspirin 81 mg chewable tablet, Chew 81 mg every 12 (twelve) hours, Disp: , Rfl:     cefdinir (OMNICEF) 300 mg capsule, Take 1 capsule (300 mg total) by mouth every 12 (twelve) hours for 4 days Do not start before July 30, 2024., Disp: 8 capsule, Rfl: 0    guaiFENesin (MUCINEX) 600 mg 12 hr tablet, Take 600 mg by mouth daily Pt taking 600mg daily, Disp: , Rfl:     metoprolol succinate (TOPROL-XL) 25 mg 24 hr tablet, Take 1 tablet (25 mg total) by mouth every 12 (twelve) hours, Disp: 180 tablet, Rfl: 3    multivitamin (THERAGRAN) TABS, Take 1 tablet by mouth daily, Disp: , Rfl:     Nutritional Supplements (PROSTATE PO), Take 1 Capful by mouth daily. Indications: Enlarged prostate, Disp: , Rfl:     omeprazole (PriLOSEC) 10 mg delayed release capsule, Take 20 mg by mouth daily, Disp: , Rfl:       Physical Exam:   /70 (BP Location: Left arm, Patient Position: Sitting, Cuff Size: Standard)   Pulse 78   Ht 5' 9\" (1.753 m)   Wt 83.5 kg (184 lb)   SpO2 98%   BMI 27.17 kg/m²   GEN: no acute distress    RESP: breathing comfortably with no accessory muscle use    ABD: soft, non-tender, non-distended   INCISION:    EXT: no significant peripheral edema   (Male): Penis circumcised, a well-circumscribed reddened area on the glans including the meatus which is swollen and tight with a small amount of yellow discharge. "  It is also considerably tender.  Testicles descended into scrotum bilaterally without masses nor tenderness.  No inguinal hernias bilaterally.      RADIOLOGY:   IMPRESSION:     1. No hydronephrosis.     2. Underdistended bladder. Within limitations, no focal thickening or mass lesions. Small cystic structure adjacent to the bladder likely to represent a small bladder diverticulum.     3. Mild hepatic steatosis.     Assessment:   #1.  Urinary retention  #2.  Meatal stenosis/edema  #3.  Recent GNR sepsis and UTI  #4.  Chronic kidney disease    Plan:   -I was unable to access the meatus for cystoscopy today this was due to pain edema as well as discharge  -Added nystatin cream twice a day  -CT abdomen and pelvis without contrast  -Follow-up in about 2 weeks  -Once the irritation on the glans has calm down we can do a cystoscopy in the office if tolerated and is not we will schedule a procedure under anesthesia

## 2024-08-04 VITALS
TEMPERATURE: 96.3 F | HEART RATE: 73 BPM | SYSTOLIC BLOOD PRESSURE: 120 MMHG | RESPIRATION RATE: 20 BRPM | DIASTOLIC BLOOD PRESSURE: 64 MMHG | OXYGEN SATURATION: 95 %

## 2024-08-04 NOTE — CASE COMMUNICATION
Medication discrepancies or Major drug interactions None   Abnormal clinical findings None   This report is informational only, no response is needed   [facility] has Admitted your patient to Home Health service with the following disciplines: SN   Patient stated goals of care To be able to get around without the catheter.   Potential barriers to goal achievement Pt not drinking adequate fluids   Primary focus of home health care:GI /   Anticipated visit pattern and next visit date 8/6 1 week 1 then 2 weeks x2; then 1x weekly   Thank you for allowing us to participate in the care of your patient.   Jenise Del Valle RN

## 2024-08-06 ENCOUNTER — HOME CARE VISIT (OUTPATIENT)
Dept: HOME HEALTH SERVICES | Facility: HOME HEALTHCARE | Age: 79
End: 2024-08-06
Payer: MEDICARE

## 2024-08-06 VITALS
HEART RATE: 74 BPM | DIASTOLIC BLOOD PRESSURE: 76 MMHG | OXYGEN SATURATION: 93 % | RESPIRATION RATE: 20 BRPM | TEMPERATURE: 96.6 F | SYSTOLIC BLOOD PRESSURE: 122 MMHG

## 2024-08-06 PROCEDURE — G0299 HHS/HOSPICE OF RN EA 15 MIN: HCPCS

## 2024-08-06 NOTE — TELEPHONE ENCOUNTER
RONALD Burden with [facility], called in stating there are no orders in chart for patient's SPT. Requesting orders for catheter care and changes if needed. Please advise.     Jenise can be reached at [phone]

## 2024-08-08 ENCOUNTER — HOME CARE VISIT (OUTPATIENT)
Dept: HOME HEALTH SERVICES | Facility: HOME HEALTHCARE | Age: 79
End: 2024-08-08
Payer: MEDICARE

## 2024-08-08 PROCEDURE — G0299 HHS/HOSPICE OF RN EA 15 MIN: HCPCS

## 2024-08-08 NOTE — TELEPHONE ENCOUNTER
- SPT to be flushed daily and as needed with 60 cc sterile water  - SPT to be changed as needed and every 4-6 weeks with 18 Fr catheter and 10 cc balloon    Karen Horn PA-C

## 2024-08-09 ENCOUNTER — HOSPITAL ENCOUNTER (OUTPATIENT)
Dept: CT IMAGING | Facility: HOSPITAL | Age: 79
Discharge: HOME | End: 2024-08-09
Payer: MEDICARE

## 2024-08-09 DIAGNOSIS — N39.0 URINARY TRACT INFECTION WITH HEMATURIA, SITE UNSPECIFIED: ICD-10-CM

## 2024-08-09 DIAGNOSIS — R31.9 URINARY TRACT INFECTION WITH HEMATURIA, SITE UNSPECIFIED: ICD-10-CM

## 2024-08-09 PROCEDURE — 74176 CT ABD & PELVIS W/O CONTRAST: CPT

## 2024-08-11 VITALS
RESPIRATION RATE: 20 BRPM | OXYGEN SATURATION: 94 % | SYSTOLIC BLOOD PRESSURE: 118 MMHG | DIASTOLIC BLOOD PRESSURE: 68 MMHG | HEART RATE: 70 BPM | TEMPERATURE: 97.2 F

## 2024-08-15 ENCOUNTER — HOME CARE VISIT (OUTPATIENT)
Dept: HOME HEALTH SERVICES | Facility: HOME HEALTHCARE | Age: 79
End: 2024-08-15
Payer: MEDICARE

## 2024-08-16 ENCOUNTER — PROCEDURE VISIT (OUTPATIENT)
Dept: UROLOGY | Facility: CLINIC | Age: 79
End: 2024-08-16
Payer: MEDICARE

## 2024-08-16 VITALS
BODY MASS INDEX: 27.55 KG/M2 | HEIGHT: 69 IN | WEIGHT: 186 LBS | SYSTOLIC BLOOD PRESSURE: 140 MMHG | DIASTOLIC BLOOD PRESSURE: 80 MMHG | OXYGEN SATURATION: 96 % | HEART RATE: 66 BPM

## 2024-08-16 DIAGNOSIS — R33.9 URINARY RETENTION: Primary | ICD-10-CM

## 2024-08-16 PROCEDURE — 99213 OFFICE O/P EST LOW 20 MIN: CPT | Performed by: PHYSICIAN ASSISTANT

## 2024-08-16 RX ORDER — SODIUM CHLORIDE, SODIUM LACTATE, POTASSIUM CHLORIDE, CALCIUM CHLORIDE 600; 310; 30; 20 MG/100ML; MG/100ML; MG/100ML; MG/100ML
125 INJECTION, SOLUTION INTRAVENOUS CONTINUOUS
OUTPATIENT
Start: 2024-08-16

## 2024-08-16 NOTE — PROGRESS NOTES
UROLOGY PROGRESS NOTE   Patient Identifiers: Mathew Sloan (MRN [id number])  Date of Service: 8/16/2024    Subjective:   79-year-old man with history of urinary retention and severe balanitis with meatal stenosis.  I saw him August 2 and he still had fairly prominent infection on the glans with swelling and pain.  Treated with topical nystatin all symptoms seem to have resolved however his meatal stenosis remains quite tight.  Suprapubic catheter is draining he does void some urine out of the penis.    Reason for visit: Urinary retention and meatal stenosis follow-up    Objective:     VITALS:    Vitals:    08/16/24 1103   BP: 140/80   Pulse: 66   SpO2: 96%     AUA SYMPTOM SCORE      Flowsheet Row Most Recent Value   AUA SYMPTOM SCORE    How often have you had a sensation of not emptying your bladder completely after you finished urinating? 4 (P)     How often have you had to urinate again less than two hours after you finished urinating? 5 (P)     How often have you found you stopped and started again several times when you urinate? 1 (P)     How often have you found it difficult to postpone urination? 4 (P)     How often have you had a weak urinary stream? 4 (P)     How often have you had to push or strain to begin urination? 2 (P)     How many times did you most typically get up to urinate from the time you went to bed at night until the time you got up in the morning? 3 (P)     Quality of Life: If you were to spend the rest of your life with your urinary condition just the way it is now, how would you feel about that? 6 (P)     AUA SYMPTOM SCORE 23 (P)                LABS:  Lab Results   Component Value Date    HGB 12.1 07/29/2024    HCT 38.0 07/29/2024    WBC 9.95 07/29/2024     07/29/2024   ]    Lab Results   Component Value Date    K 3.5 07/29/2024     07/29/2024    CO2 24 07/29/2024    BUN 29 (H) 07/29/2024    CREATININE 2.39 (H) 07/29/2024    CALCIUM 8.7 07/29/2024   ]        INPATIENT  "MEDS:    Current Outpatient Medications:     acetaminophen (TYLENOL) 325 mg tablet, Take 2 tablets (650 mg total) by mouth every 6 (six) hours as needed for mild pain, fever or headaches, Disp: 120 tablet, Rfl: 0    Apoaequorin (PREVAGEN PO), Take 1 capsule by mouth in the morning, Disp: , Rfl:     aspirin 81 mg chewable tablet, Chew 81 mg every 12 (twelve) hours, Disp: , Rfl:     guaiFENesin (MUCINEX) 600 mg 12 hr tablet, Take 600 mg by mouth daily Pt taking 600mg daily, Disp: , Rfl:     metoprolol succinate (TOPROL-XL) 25 mg 24 hr tablet, Take 1 tablet (25 mg total) by mouth every 12 (twelve) hours, Disp: 180 tablet, Rfl: 3    multivitamin (THERAGRAN) TABS, Take 1 tablet by mouth daily, Disp: , Rfl:     Nutritional Supplements (PROSTATE PO), Take 1 Capful by mouth daily. Indications: Enlarged prostate, Disp: , Rfl:     nystatin (MYCOSTATIN) cream, Apply topically 2 (two) times a day (Patient taking differently: Apply 1 Application topically 2 (two) times a day Apply to open area on penis), Disp: 30 g, Rfl: 1    omeprazole (PriLOSEC) 10 mg delayed release capsule, Take 20 mg by mouth daily, Disp: , Rfl:       Physical Exam:   /80 (BP Location: Left arm, Patient Position: Sitting, Cuff Size: Standard)   Pulse 66   Ht 5' 9\" (1.753 m)   Wt 84.4 kg (186 lb)   SpO2 96%   BMI 27.47 kg/m²   GEN: no acute distress    RESP: breathing comfortably with no accessory muscle use    ABD: soft, non-tender, non-distended   INCISION:    EXT: no significant peripheral edema   (Male): Penis circumcised, phallus normal, meatus is pinpoint testicles descended into scrotum bilaterally without masses nor tenderness.  No inguinal hernias bilaterally.      RADIOLOGY:   IMPRESSION:     1.  Mild generalized left renal cortical atrophy.     2.  4 mm nonobstructing left renal calculus.     3.  Two 8 mm bladder calculi.     4.  Suprapubic catheter in place.     5.  Large hiatal hernia containing the entire gastric fundus. "     Assessment:   #1.  Urinary retention  #2.  Meatal stenosis    Plan:   -I offered him dilation and cystoscopy in the office  -He is still fairly sensitive and would prefer a procedure under anesthesia  -I had him sign a consent and I placed a case request  -The surgery scheduler was notified

## 2024-08-20 ENCOUNTER — HOME CARE VISIT (OUTPATIENT)
Dept: HOME HEALTH SERVICES | Facility: HOME HEALTHCARE | Age: 79
End: 2024-08-20
Payer: MEDICARE

## 2024-08-20 PROCEDURE — G0299 HHS/HOSPICE OF RN EA 15 MIN: HCPCS

## 2024-08-21 VITALS
DIASTOLIC BLOOD PRESSURE: 72 MMHG | OXYGEN SATURATION: 95 % | HEART RATE: 72 BPM | TEMPERATURE: 96.5 F | SYSTOLIC BLOOD PRESSURE: 138 MMHG | RESPIRATION RATE: 20 BRPM

## 2024-08-22 ENCOUNTER — TELEPHONE (OUTPATIENT)
Age: 79
End: 2024-08-22

## 2024-08-22 NOTE — TELEPHONE ENCOUNTER
Pt calling to speak with SS to schedule procedure with Jn. Pt is requesting a call back from SS to schedule.     Pt call back- [phone]

## 2024-08-23 NOTE — TELEPHONE ENCOUNTER
I emailed Dr. Blandon to obtain approval to schedule pt.'s surgery with him. Once I receive approval I will call pt to discuss and schedule.

## 2024-08-26 PROBLEM — N39.0 COMPLICATED UTI (URINARY TRACT INFECTION): Status: RESOLVED | Noted: 2024-02-16 | Resolved: 2024-08-26

## 2024-08-27 ENCOUNTER — HOME CARE VISIT (OUTPATIENT)
Dept: HOME HEALTH SERVICES | Facility: HOME HEALTHCARE | Age: 79
End: 2024-08-27
Payer: MEDICARE

## 2024-08-27 PROCEDURE — G0299 HHS/HOSPICE OF RN EA 15 MIN: HCPCS

## 2024-08-27 NOTE — TELEPHONE ENCOUNTER
[facility] with [facility] called stating patient hasn't heard from surgical scheduler. Advised it can take some time and that last note states waiting on approval from Dr. Blandon. Transferred to  to check status.

## 2024-08-28 ENCOUNTER — HOME CARE VISIT (OUTPATIENT)
Dept: HOME HEALTH SERVICES | Facility: HOME HEALTHCARE | Age: 79
End: 2024-08-28
Payer: MEDICARE

## 2024-08-28 VITALS
OXYGEN SATURATION: 96 % | HEART RATE: 65 BPM | SYSTOLIC BLOOD PRESSURE: 114 MMHG | RESPIRATION RATE: 20 BRPM | TEMPERATURE: 96 F | DIASTOLIC BLOOD PRESSURE: 66 MMHG

## 2024-08-29 ENCOUNTER — PREP FOR PROCEDURE (OUTPATIENT)
Dept: UROLOGY | Facility: AMBULATORY SURGERY CENTER | Age: 79
End: 2024-08-29

## 2024-08-29 DIAGNOSIS — N39.0 URINARY TRACT INFECTION WITH HEMATURIA, SITE UNSPECIFIED: ICD-10-CM

## 2024-08-29 DIAGNOSIS — R33.9 URINARY RETENTION: Primary | ICD-10-CM

## 2024-08-29 DIAGNOSIS — R31.9 URINARY TRACT INFECTION WITH HEMATURIA, SITE UNSPECIFIED: ICD-10-CM

## 2024-08-29 PROCEDURE — 10330064 SYRINGE, CATH TIP FLAT STR 60CC (50/CS)

## 2024-08-29 PROCEDURE — 10330064 CATHETER, FOLEY 2WAY 18FR 5CC (10/BX)

## 2024-08-29 PROCEDURE — 10330064 SALINE, IRR SOL STR 100ML (48/CS) MGM37

## 2024-08-29 PROCEDURE — 10330064 BAG, URINE DRN (20/CS)        BARD

## 2024-08-29 PROCEDURE — 10330064 CATH TRAY, FOLEY SYR 10CC (20/CS)

## 2024-08-29 NOTE — CASE COMMUNICATION
Ship to Pt. Home    Ordering MD Dr Nick Haskins,    Wound 1  suprapubic catheter        18fr 5cc cath [id number]   1     Syringe 10cc [id number]   1     10cc cath tray [id number]    1    Drain bag 2000cc [id number] 1    Saline 100ml [id number]  2    Syringe 60cc [id number]   1

## 2024-08-29 NOTE — TELEPHONE ENCOUNTER
Spoke with patient and confirmed surgery date of: 9/20/2024  Type of surgery:Cysto/DVIU  Operating physician: Dr. Blandon  Location of surgery: [facility]    Verbally went over prep with patient on: 8/29/2024  NPO  Bowel prep? No  Hospital calls afternoon prior with arrival time -Calls Friday afternoon for Monday surgeries  Patient needs ride to and from surgery outpatient  Pre-op testing to be done 2 weeks prior to surgery  Urine culture  Blood thinners:   NONE  Clearances needed: NONE    Mailed to patient on: 8/29/2024  Copy of packet scanned into Media on: 8/29/2024  Labs in packet  Soap / Bowel prep in packet  Pre-op & Post-op in packet  Dates of H&P and post-op if needed    Consent: in Media

## 2024-09-02 NOTE — CASE COMMUNICATION
ok TY.     ----- Message -----  From: Kemi Velásquez RN  Sent: 8/29/2024   9:28 AM EDT  To: Jenise Del Valle RN      10 ml syringes have to be picked up from the office because we can no longer order single  ones  ----- Message -----  From: Jenise Del Valle RN  Sent: 8/28/2024   9:29 PM EDT  To: /[facility]     Ship to Pt. Home    Ordering MD Dr Nick Haskins,    Wound 1  suprapubic catheter        18fr 5cc cath [id number]    1     Syringe 10cc [id number]   1     10cc cath tray [id number]    1    Drain bag 2000cc [id number] 1    Saline 100ml [id number]  2    Syringe 60cc [id number]   1

## 2024-09-06 ENCOUNTER — APPOINTMENT (OUTPATIENT)
Dept: LAB | Facility: CLINIC | Age: 79
End: 2024-09-06
Payer: MEDICARE

## 2024-09-06 DIAGNOSIS — R31.9 URINARY TRACT INFECTION WITH HEMATURIA, SITE UNSPECIFIED: ICD-10-CM

## 2024-09-06 DIAGNOSIS — N39.0 URINARY TRACT INFECTION WITH HEMATURIA, SITE UNSPECIFIED: ICD-10-CM

## 2024-09-06 DIAGNOSIS — R33.9 URINARY RETENTION: ICD-10-CM

## 2024-09-06 PROCEDURE — 87186 SC STD MICRODIL/AGAR DIL: CPT

## 2024-09-06 PROCEDURE — 87086 URINE CULTURE/COLONY COUNT: CPT

## 2024-09-08 LAB — BACTERIA UR CULT: ABNORMAL

## 2024-09-09 ENCOUNTER — ANESTHESIA EVENT (OUTPATIENT)
Dept: PERIOP | Facility: HOSPITAL | Age: 79
End: 2024-09-09
Payer: MEDICARE

## 2024-09-09 DIAGNOSIS — N39.0 URINARY TRACT INFECTION WITH HEMATURIA, SITE UNSPECIFIED: ICD-10-CM

## 2024-09-09 DIAGNOSIS — R33.9 URINARY RETENTION: Primary | ICD-10-CM

## 2024-09-09 DIAGNOSIS — R31.9 URINARY TRACT INFECTION WITH HEMATURIA, SITE UNSPECIFIED: ICD-10-CM

## 2024-09-09 RX ORDER — CEPHALEXIN 500 MG/1
500 CAPSULE ORAL EVERY 12 HOURS SCHEDULED
Qty: 20 CAPSULE | Refills: 0 | Status: SHIPPED | OUTPATIENT
Start: 2024-09-09 | End: 2024-09-20

## 2024-09-10 DIAGNOSIS — R00.2 PALPITATIONS: ICD-10-CM

## 2024-09-10 DIAGNOSIS — I48.0 PAROXYSMAL ATRIAL FIBRILLATION (HCC): ICD-10-CM

## 2024-09-10 RX ORDER — METOPROLOL SUCCINATE 25 MG/1
25 TABLET, EXTENDED RELEASE ORAL EVERY 12 HOURS
Qty: 180 TABLET | Refills: 1 | Status: SHIPPED | OUTPATIENT
Start: 2024-09-10

## 2024-09-11 NOTE — PRE-PROCEDURE INSTRUCTIONS
Pre-Surgery Instructions:   Medication Instructions    Apoaequorin (PREVAGEN PO) Stop taking 7 days prior to surgery.    cephalexin (KEFLEX) 500 mg capsule Hold day of surgery.    metoprolol succinate (TOPROL-XL) 25 mg 24 hr tablet Take day of surgery.    Nutritional Supplements (PROSTATE PO) Stop taking 7 days prior to surgery.    omeprazole (PriLOSEC) 10 mg delayed release capsule Take day of surgery.    Medication instructions for day surgery reviewed. Please use only a sip of water to take your instructed medications. Avoid all over the counter vitamins, supplements and NSAIDS for one week prior to surgery per anesthesia guidelines. Tylenol is ok to take as needed.     You will receive a call one business day prior to surgery with an arrival time and hospital directions. If your surgery is scheduled on a Monday, the hospital will be calling you on the Friday prior to your surgery. If you have not heard from anyone by 8pm, please call the hospital supervisor through the hospital  at [phone]. ([place] [phone] or [place] [phone]).    Do not eat or drink anything after midnight the night before your surgery, including candy, mints, lifesavers, or chewing gum. Do not drink alcohol 24hrs before your surgery. Try not to smoke at least 24hrs before your surgery.       Follow the pre surgery showering instructions as listed in the “My Surgical Experience Booklet” or otherwise provided by your surgeon's office. Do not use a blade to shave the surgical area 1 week before surgery. It is okay to use a clean electric clippers up to 24 hours before surgery. Do not apply any lotions, creams, including makeup, cologne, deodorant, or perfumes after showering on the day of your surgery. Do not use dry shampoo, hair spray, hair gel, or any type of hair products.     No contact lenses, eye make-up, or artificial eyelashes. Remove nail polish, including gel polish, and any artificial, gel, or acrylic nails if  possible. Remove all jewelry including rings and body piercing jewelry.     Wear causal clothing that is easy to take on and off. Consider your type of surgery.    Keep any valuables, jewelry, piercings at home. Please bring any specially ordered equipment (sling, braces) if indicated.    Arrange for a responsible person to drive you to and from the hospital on the day of your surgery. Please confirm the visitor policy for the day of your procedure when you receive your phone call with an arrival time.     Call the surgeon's office with any new illnesses, exposures, or additional questions prior to surgery.    Please reference your “My Surgical Experience Booklet” for additional information to prepare for your upcoming surgery.

## 2024-09-20 ENCOUNTER — HOSPITAL ENCOUNTER (EMERGENCY)
Facility: HOSPITAL | Age: 79
Discharge: HOME | End: 2024-09-20
Attending: EMERGENCY MEDICINE
Payer: MEDICARE

## 2024-09-20 ENCOUNTER — ANESTHESIA (OUTPATIENT)
Dept: PERIOP | Facility: HOSPITAL | Age: 79
End: 2024-09-20
Payer: MEDICARE

## 2024-09-20 ENCOUNTER — APPOINTMENT (OUTPATIENT)
Dept: RADIOLOGY | Facility: HOSPITAL | Age: 79
End: 2024-09-20
Payer: MEDICARE

## 2024-09-20 ENCOUNTER — TELEPHONE (OUTPATIENT)
Dept: UROLOGY | Facility: CLINIC | Age: 79
End: 2024-09-20

## 2024-09-20 ENCOUNTER — HOSPITAL ENCOUNTER (OUTPATIENT)
Facility: HOSPITAL | Age: 79
Setting detail: OUTPATIENT SURGERY
Discharge: HOME | End: 2024-09-20
Attending: UROLOGY | Admitting: UROLOGY
Payer: MEDICARE

## 2024-09-20 VITALS
DIASTOLIC BLOOD PRESSURE: 65 MMHG | HEART RATE: 57 BPM | TEMPERATURE: 97.6 F | SYSTOLIC BLOOD PRESSURE: 147 MMHG | BODY MASS INDEX: 26.96 KG/M2 | WEIGHT: 182 LBS | OXYGEN SATURATION: 95 % | HEIGHT: 69 IN | RESPIRATION RATE: 18 BRPM

## 2024-09-20 VITALS
HEART RATE: 60 BPM | SYSTOLIC BLOOD PRESSURE: 120 MMHG | RESPIRATION RATE: 16 BRPM | DIASTOLIC BLOOD PRESSURE: 73 MMHG | OXYGEN SATURATION: 95 % | TEMPERATURE: 97.8 F

## 2024-09-20 DIAGNOSIS — N48.1 BALANITIS: ICD-10-CM

## 2024-09-20 DIAGNOSIS — R33.9 URINARY RETENTION: Primary | ICD-10-CM

## 2024-09-20 DIAGNOSIS — L76.82 BLEEDING AT INSERTION SITE: Primary | ICD-10-CM

## 2024-09-20 DIAGNOSIS — G89.18 POSTOPERATIVE PAIN: ICD-10-CM

## 2024-09-20 PROCEDURE — NC001 PR NO CHARGE: Performed by: UROLOGY

## 2024-09-20 PROCEDURE — 99284 EMERGENCY DEPT VISIT MOD MDM: CPT | Performed by: EMERGENCY MEDICINE

## 2024-09-20 PROCEDURE — 99283 EMERGENCY DEPT VISIT LOW MDM: CPT

## 2024-09-20 PROCEDURE — 52281 CYSTOSCOPY AND TREATMENT: CPT | Performed by: UROLOGY

## 2024-09-20 PROCEDURE — 51705 CHANGE OF BLADDER TUBE: CPT | Performed by: UROLOGY

## 2024-09-20 RX ORDER — DOCUSATE SODIUM 100 MG/1
100 CAPSULE, LIQUID FILLED ORAL 2 TIMES DAILY
Qty: 30 CAPSULE | Refills: 0 | Status: SHIPPED | OUTPATIENT
Start: 2024-09-20 | End: 2024-10-05

## 2024-09-20 RX ORDER — FENTANYL CITRATE 50 UG/ML
INJECTION, SOLUTION INTRAMUSCULAR; INTRAVENOUS AS NEEDED
Status: DISCONTINUED | OUTPATIENT
Start: 2024-09-20 | End: 2024-09-20

## 2024-09-20 RX ORDER — SODIUM CHLORIDE, SODIUM LACTATE, POTASSIUM CHLORIDE, CALCIUM CHLORIDE 600; 310; 30; 20 MG/100ML; MG/100ML; MG/100ML; MG/100ML
125 INJECTION, SOLUTION INTRAVENOUS CONTINUOUS
Status: DISCONTINUED | OUTPATIENT
Start: 2024-09-20 | End: 2024-09-20 | Stop reason: HOSPADM

## 2024-09-20 RX ORDER — NAPROXEN 500 MG/1
500 TABLET ORAL 2 TIMES DAILY WITH MEALS
Qty: 10 TABLET | Refills: 0 | Status: SHIPPED | OUTPATIENT
Start: 2024-09-20 | End: 2024-09-25

## 2024-09-20 RX ORDER — HYDROMORPHONE HCL/PF 1 MG/ML
0.2 SYRINGE (ML) INJECTION EVERY 2 HOUR PRN
Status: DISCONTINUED | OUTPATIENT
Start: 2024-09-20 | End: 2024-09-20 | Stop reason: HOSPADM

## 2024-09-20 RX ORDER — PROPOFOL 10 MG/ML
INJECTION, EMULSION INTRAVENOUS AS NEEDED
Status: DISCONTINUED | OUTPATIENT
Start: 2024-09-20 | End: 2024-09-20

## 2024-09-20 RX ORDER — OXYCODONE HYDROCHLORIDE 5 MG/1
5 TABLET ORAL EVERY 4 HOURS PRN
Status: DISCONTINUED | OUTPATIENT
Start: 2024-09-20 | End: 2024-09-20 | Stop reason: HOSPADM

## 2024-09-20 RX ORDER — CEFAZOLIN SODIUM 2 G/50ML
2000 SOLUTION INTRAVENOUS
Status: COMPLETED | OUTPATIENT
Start: 2024-09-20 | End: 2024-09-20

## 2024-09-20 RX ORDER — OXYCODONE HYDROCHLORIDE 5 MG/1
5 TABLET ORAL ONCE
Status: COMPLETED | OUTPATIENT
Start: 2024-09-20 | End: 2024-09-20

## 2024-09-20 RX ORDER — LIDOCAINE HYDROCHLORIDE 10 MG/ML
INJECTION, SOLUTION EPIDURAL; INFILTRATION; INTRACAUDAL; PERINEURAL AS NEEDED
Status: DISCONTINUED | OUTPATIENT
Start: 2024-09-20 | End: 2024-09-20

## 2024-09-20 RX ORDER — OXYCODONE HYDROCHLORIDE 5 MG/1
5 TABLET ORAL EVERY 4 HOURS PRN
Qty: 5 TABLET | Refills: 0 | Status: SHIPPED | OUTPATIENT
Start: 2024-09-20 | End: 2024-09-25

## 2024-09-20 RX ORDER — FENTANYL CITRATE/PF 50 MCG/ML
25 SYRINGE (ML) INJECTION
Status: DISCONTINUED | OUTPATIENT
Start: 2024-09-20 | End: 2024-09-20 | Stop reason: HOSPADM

## 2024-09-20 RX ORDER — ONDANSETRON 2 MG/ML
4 INJECTION INTRAMUSCULAR; INTRAVENOUS ONCE AS NEEDED
Status: DISCONTINUED | OUTPATIENT
Start: 2024-09-20 | End: 2024-09-20 | Stop reason: HOSPADM

## 2024-09-20 RX ORDER — ACETAMINOPHEN 325 MG/1
650 TABLET ORAL EVERY 4 HOURS PRN
Start: 2024-09-20

## 2024-09-20 RX ORDER — PROPOFOL 10 MG/ML
INJECTION, EMULSION INTRAVENOUS CONTINUOUS PRN
Status: DISCONTINUED | OUTPATIENT
Start: 2024-09-20 | End: 2024-09-20

## 2024-09-20 RX ADMIN — LIDOCAINE HYDROCHLORIDE 50 MG: 10 INJECTION, SOLUTION EPIDURAL; INFILTRATION; INTRACAUDAL at 11:54

## 2024-09-20 RX ADMIN — PROPOFOL 100 MCG/KG/MIN: 10 INJECTION, EMULSION INTRAVENOUS at 11:55

## 2024-09-20 RX ADMIN — PROPOFOL 50 MG: 10 INJECTION, EMULSION INTRAVENOUS at 11:54

## 2024-09-20 RX ADMIN — OXYCODONE HYDROCHLORIDE 5 MG: 5 TABLET ORAL at 17:06

## 2024-09-20 RX ADMIN — SODIUM CHLORIDE, SODIUM LACTATE, POTASSIUM CHLORIDE, AND CALCIUM CHLORIDE: .6; .31; .03; .02 INJECTION, SOLUTION INTRAVENOUS at 09:51

## 2024-09-20 RX ADMIN — PROPOFOL 30 MG: 10 INJECTION, EMULSION INTRAVENOUS at 11:55

## 2024-09-20 RX ADMIN — OXYCODONE HYDROCHLORIDE 5 MG: 5 TABLET ORAL at 13:00

## 2024-09-20 RX ADMIN — CEFAZOLIN SODIUM 2000 MG: 2 SOLUTION INTRAVENOUS at 11:51

## 2024-09-20 RX ADMIN — FENTANYL CITRATE 25 MCG: 50 INJECTION INTRAMUSCULAR; INTRAVENOUS at 11:56

## 2024-09-20 NOTE — ANESTHESIA POSTPROCEDURE EVALUATION
Post-Op Assessment Note    CV Status:  Stable  Pain Score: 0    Pain management: adequate       Mental Status:  Arousable   Hydration Status:  Stable   PONV Controlled:  None   Airway Patency:  Patent     Post Op Vitals Reviewed: Yes    No anethesia notable event occurred.    Staff: CRNA               BP   125/72   Temp   97   Pulse  77   Resp   14   SpO2   99

## 2024-09-20 NOTE — OP NOTE
Operative Note     PATIENT:  Mathew Sloan (MRN [id number])    DATE OF PROCEDURE:   9/20/2024    PRE-OP DIAGNOSES:   1) urinary retention  2.  History of severe balanitis/phimosis  3.  Acquired urethral meatal stenosis     POST-OP DIAGNOSES AND OPERATIVE FINDINGS:   1) urinary retention  2.  History of severe balanitis/phimosis  3.  Acquired urethral meatal stenosis      PROCEDURES:  #1 cystoscopy with dilation of anterior urethral stricture (urethral meatus)  2.  Suprapubic catheter exchange    SURGEON:   Shade Blandon MD    ANESTHESIA TYPE:  General anesthesia    ESTIMATED BLOOD LOSS:   Minimal    COMPLICATIONS:   None    ANTIBIOTICS:  Cefazolin    INTRAOPERATIVE THROMBOEMBOLISM PROPHYLAXIS:  Pneumatic compression stockings      PROCEDURE SUMMARY:    The patient was brought to the operating room and anesthesia obtained.  The patient was then placed in the lithotomy position and prepped and draped using standard sterile technique.  All pressure points were carefully padded.  A surgical time-out occurred, antibiotics were administered, and thromboembolism prophylaxis was given.     Exam under anesthesia demonstrates a pinpoint urethral meatus.  I am able to confirm this by introducing a lacrimal duct probe.  After this maneuver I start with dilation using standard female sounds beginning with 8 Colombian.  This is sequentially dilated to 30 Colombian.  Following this I introduced the cystoscope which showed that there were no posterior urethral strictures.  The prostate demonstrates just moderate BPH changes.  The bladder is trabeculated but generally no severe signs of decompensation.    The suprapubic catheter was then exchanged and a 20 Colombian urethral Lomeli was placed without resistance.      DISPOSITION:   PACU - hemodynamically stable.    PLAN:  -Patient will return to the office for a nurse visit next week at which time his Lomeli catheter will be removed, he will be taught to clamp his suprapubic catheter  and measure both his volume of voided urine per urethra and the volume of retained urine in his suprapubic catheter to measure postvoid residuals.  We will have him do these measurements for a few weeks, he would then follow-up with our advanced practitioner team.  If he is emptying his bladder well his suprapubic catheter can be removed

## 2024-09-20 NOTE — TELEPHONE ENCOUNTER
Please schedule this nurse visit and subsequent visit with Jn:      Patient will return to the office for a nurse visit next week at which time his Lomeli catheter will be removed, he will be taught to clamp his suprapubic catheter and measure both his volume of voided urine per urethra and the volume of retained urine in his suprapubic catheter to measure postvoid residuals.  We will have him do these measurements for a few weeks, he would then follow-up with our advanced practitioner team.  If he is emptying his bladder well his suprapubic catheter can be removed    [de-identified] : Sinus rhythm at 67 bpm.  First degree AV-block.  Incomplete right bundle branch block.  Low QRS voltage.  No acute ischemic changes. Tereza

## 2024-09-20 NOTE — ANESTHESIA PREPROCEDURE EVALUATION
Procedure:  CYSTOSCOPY DILATION OF STRICTURE/MEATAL STENOSIS AND CATHETER PACEMENT (Urethra)    Relevant Problems   CARDIO   (+) Essential hypertension      /RENAL   (+) ROSELYN (acute kidney injury) (HCC)   (+) Stage 3b chronic kidney disease (HCC)      PULMONARY   (+) YESENIA (obstructive sleep apnea)      Hypertension    Asthma    Paroxysmal A-fib (HCC)    Paroxysmal SVT (supraventricular tachycardia)    CKD (chronic kidney disease)    Hypercholesteremia        Left Ventricle: Left ventricular cavity size is normal. Wall thickness  is mildly increased. The left ventricular ejection fraction is 60%.  Systolic function is normal. Wall motion is normal. Diastolic function is  mildly abnormal, consistent with grade I (abnormal) relaxation.    Right Ventricle: Systolic function is normal.    Left Atrium: The atrium is mildly dilated.    Mitral Valve: There is mild to moderate regurgitation.    Tricuspid Valve: There is mild regurgitation. The estimated right  ventricular systolic pressure is 39.00 mmHg.      Physical Exam    Airway    Mallampati score: II  TM Distance: >3 FB  Neck ROM: full     Dental       Cardiovascular  Cardiovascular exam normal    Pulmonary  Pulmonary exam normal     Other Findings        Anesthesia Plan  ASA Score- 3     Anesthesia Type- general with ASA Monitors.         Additional Monitors:     Airway Plan: LMA.           Plan Factors-Exercise tolerance (METS): >4 METS.    Chart reviewed. EKG reviewed. Imaging results reviewed. Existing labs reviewed. Patient summary reviewed.    Patient is not a current smoker.              Induction- intravenous.    Postoperative Plan- . Planned trial extubation    Perioperative Resuscitation Plan - Level 1 - Full Code.       Informed Consent- Anesthetic plan and risks discussed with patient.  I personally reviewed this patient with the CRNA. Discussed and agreed on the Anesthesia Plan with the CRNA..         Yes

## 2024-09-20 NOTE — H&P
UROLOGY HISTORY AND PHYSICAL     Patient Identifiers: Mathew Sloan (MRN [id number])      Date of Service: 9/20/2024        ASSESSMENT:     79 y.o. old male with presented with fulminant urinary retention 2 months ago due to severe phimosis and balanitis.  He was managed with a suprapubic catheter and medical treatment.  His balanitis improved but he has persistent urethral meatal stenosis and remains suprapubic tube dependent.      PLAN:     Dilation of urethral stricture, cystoscopy, possible DVIU, Lomeli catheter placement.    Will likely maintain a Lomeli catheter for 4 days postop and ultimately we can plan to bring the patient into the office to clamp his suprapubic tube and measure residuals for few weeks and consideration of removal    History of Present Illness:     Mathew Sloan is a 79 y.o. old with a history of urethral stricture disease, balanitis and phimosis, history of urinary retention    Past Medical, Past Surgical History:     Past Medical History:   Diagnosis Date    Asthma     Cancer (HCC)     basal cell- shoulder right    CKD (chronic kidney disease)     GERD (gastroesophageal reflux disease)     Hiatal hernia     Hypercholesteremia     Hypertension     Kidney stone     Paroxysmal A-fib (HCC)     Paroxysmal SVT (supraventricular tachycardia)     Pneumonia     Shoulder dislocation    :    Past Surgical History:   Procedure Laterality Date    CYST REMOVAL      IR SUPRAPUBIC CATHETER PLACEMENT  07/28/2024    IR TUNNELED CENTRAL LINE PLACEMENT  02/21/2024    removed    OTHER SURGICAL HISTORY      thyroglossal cyst surgery x 2    TONSILLECTOMY     :    Medications, Allergies:     Current Facility-Administered Medications:     ceFAZolin (ANCEF) IVPB (premix in dextrose) 2,000 mg 50 mL, 2,000 mg, Intravenous, On Call To OR, Patricio Soto PA-C    lactated ringers infusion, 125 mL/hr, Intravenous, Continuous, Patricio Soto PA-C    Allergies:  Allergies   Allergen Reactions     "Chlorpheniramine Other (See Comments)    Phenylephrine Other (See Comments)   :    Social and Family History:   Social History:   Social History     Tobacco Use    Smoking status: Never    Smokeless tobacco: Never   Vaping Use    Vaping status: Never Used   Substance Use Topics    Alcohol use: Not Currently     Comment: social    Drug use: Never   .    Social History     Tobacco Use   Smoking Status Never   Smokeless Tobacco Never       Family History:  Family History   Problem Relation Age of Onset    Diabetes Mother     No Known Problems Father    :     Review of Systems:     General: Fever, chills, or night sweats: negative  Cardiac: Negative for chest pain.    Pulmonary: Negative for shortness of breath.  Gastrointestinal: Abdominal pain negative  Nausea, vomiting, or diarrhea negative  Genitourinary: See HPI above.  Patient does nothave hematuria.  All other systems queried were negative.    Physical Exam:   General: Patient is pleasant and in NAD. Awake and alert  /82   Pulse 64   Temp (!) 97.1 °F (36.2 °C) (Temporal)   Resp 18   Ht 5' 9\" (1.753 m)   Wt 82.6 kg (182 lb)   SpO2 94%   BMI 26.88 kg/m²   HEENT:  Normocephalic atraumatic  Cardiac:  Regular rate and rhythm, Peripheral edema: negative  Pulmonary: Non-labored breathing, CTAB  Abdomen: Soft, non-tender, non-distended.  No surgical scars.  No masses, tenderness, hernias noted.    Genitourinary: negative CVA tenderness, neg suprapubic tenderness.  Extremities: normal movement in all 4       Labs:     Lab Results   Component Value Date    HGB 12.1 07/29/2024    HCT 38.0 07/29/2024    WBC 9.95 07/29/2024     07/29/2024   ]    Lab Results   Component Value Date    K 3.5 07/29/2024     07/29/2024    CO2 24 07/29/2024    BUN 29 (H) 07/29/2024    CREATININE 2.39 (H) 07/29/2024    CALCIUM 8.7 07/29/2024   ]    Imaging:   I personally reviewed the images and report of the following studies, and reviewed them with the " patient:        Thank you for allowing me to participate in this patients’ care.  Please do not hesitate to call with any additional questions.  Shade Blandon MD

## 2024-09-20 NOTE — DISCHARGE INSTRUCTIONS
Today Mathew Sloan was seen in the emergency department for bleeding. I believe the symptoms to be the result of post op bleeding. At this time there does not appear to be an emergent life threatening cause to explain these symptoms. Mathew is stable for discharge with outpatient follow up.     Please keep the area wrapped for compression. Continue with scheduled f/u with urology.     Please follow up with your primary care provider in the week. If a specialist referral was placed for you please call them tomorrow to be seen at the next available appointment. Please review all results discussed today with your primary care provider and/or specialist.    Please return to the emergency department as soon as possible if you develop worsening bleeding, lightheadedness, passing out, uncontrollable fevers (Temp >100.4F), uncontrollable pain, vomiting, chest pain, trouble breathing, weakness on one side of your body, slurred speech, vision changes or any other concerning symptoms.     Thank you for choosing [facility] for your care.

## 2024-09-20 NOTE — TELEPHONE ENCOUNTER
"Patients spouse called to report patient is losing \"way to much blood\" . They were told he would have bleeding but never could imagine this much. Patient states blood is everywhere down legs, floor, denies clots.     At this time they would like to go to [facility] for further evaluation for comfort     Please let Dr. Blandon know patient is going to ED now.   "

## 2024-09-20 NOTE — DISCHARGE INSTR - AVS FIRST PAGE
Mathew Sloan Jr.:    Your surgery went very well.      You underwent a dilation of your distal urethral stricture and a cystoscopy.  The cystoscopy showed that there were no other sites of scar tissue within your urinary tract.  I placed a Lomeli catheter and a new suprapubic tube    We will bring you into the office next week to have your Lomeli catheter removed.  We will teach you to clamp your suprapubic tube and measure the volume that you urinate naturally through your penis and also measure the volume that is retained within your bladder.  We will have you take these measurements for a few weeks and if your bladder is emptying well we can remove your suprapubic catheter.    Please take your medications as prescribed with caution for comfort.  Most importantly please drink 6-8 glasses of water per day    Please call with any questions or concerns.    Shade Blandon MD,PhD  [facility]  [phone]

## 2024-09-20 NOTE — ED PROVIDER NOTES
"1. Bleeding at insertion site    2. Postoperative pain      ED Disposition       ED Disposition   Discharge    Condition   Stable    Date/Time   Fri Sep 20, 2024  4:56 PM    Comment   Mathew Sloan Jr. discharge to home/self care.                   Assessment & Plan       Medical Decision Making  Patient with history as below presented to triage with CC of \" Patient presents with:  Post-op Problem: Pt had cystoscopy today at noon, started having bleeding from penis upon getting home, pt c/o weakness. 2 drainage bags in place   \"  Hx obtained from pt and wife. Exam as below.    79-year-old male jut a few hours post op from urethral dilation with Dr. Blandon presenting with acute bledding around the wiley catheter insertion site. Wiley appears to be in appropriate position. Reports a pint of blood lost pta. On arrival to ED small clots noted to meatus but no active bleeding. No blood/or blood tinged urine in wiley. Not on AC/AP, stopped ASA a week or so prior to surgery. No specific injury or trauma. Also has SPT in place without signs of bleeding or infection. Hemodynamically stable.     Discussed with urology on-call.  Recommending wrapping the penile shaft with small stretch or Ace bandage for gentle pressure.  They note urethral bleeding is not an unanticipated finding for his procedure.  States that he has an outpatient appointment during this week which they can follow-up for further evaluation.  Patient otherwise hemodynamically stable and okay for discharge.  Reviewed strict return precautions with patient and he is agreeable discharge plan.    Differential diagnosis includes but is not limited to postop bleeding, penile injury, urethral injury, urine infection, doubt acute blood loss anemia      I have independently ordered, reviewed and interpreted the following: labs and/or imaging studies and or EKG listed below.  Reviewed external records including notes, and prior labs/imaging " results.    Disposition: Patient stable for outpatient management. Discussed need for follow up with their primary doctor or specialist to review all results, including incidental findings as below. Patient discharged with explanation of ED workup and diagnosis, instructions on how to obtain outpatient follow up, care instructions at home, and strict return precautions if patient develops new or worsening symptoms. Patients questions answered and agreeable with discharge plan.     See ED Course for further MDM.      PLEASE NOTE:  This encounter was completed utilizing the [employer] Direct Speech Voice Recognition Software. Grammatical errors, random word insertions, pronoun errors and incomplete sentences are occasional inherent consequences of the system due to software limitations, ambient noise and hardware issues.These may be missed by proof reading prior to affixing electronic signature. Any questions or concerns about the content, text or information contained within the body of this dictation should be directly addressed to the physician for clarification. Please do not hesitate to call me directly if you have any questions or concerns.      Amount and/or Complexity of Data Reviewed  Independent Historian: spouse  External Data Reviewed: notes.    Risk  Prescription drug management.                ED Course as of 09/22/24 2356   Fri Sep 20, 2024   1640 Blood Pressure: 165/80   1640 Pulse: 80   1640 Message sent to on call urology.    1700 Per uro:   Please wrap the penile shaft if anatomically feasible with a small stretch or Ace bandage for gentle pressure.  May need to be cut and fit to size.  Urethral bleeding is not in unanticipated finding for his procedure.  He has an appointment during the week.  The nursing staff can remove it then.  Please give him an extra Ace bandage in case he gets a dirty/too soiled.     Pts peile shaft wrapped with ace bandage. Pt otherwise hemodynamically stable, without  active bleeding. No bleeding in the wiley tube or bag. Pt otherwise making urine. He is stable for dc home with close outpt f/u. Strict return precautions discussed with pt and wife.        Medications   oxyCODONE (ROXICODONE) IR tablet 5 mg (5 mg Oral Given 9/20/24 1706)       History of Present Illness       79-year-old male with recent history of ureteral dilation presenting to the ED with complaints of acute bleeding noted from penile shaft after being discharged from the hospital status post surgery.  Patient had surgery which completed around noon today.  On arrival home he noticed about 1 point of blood loss from his penile shaft.  Otherwise no hematuria noted in Wiley bag.  Patient also has SPT in place without hematuria.  Patient denies lightheadedness, syncope, fever, chills, abdominal pain, nausea, vomiting, chest pain, shortness of breath.  Patient on aspirin, but has held this for the past week given his surgery.  Denies other AC/AP use.  Denies penile pain, scrotal swelling, testicular pain, testicular swelling, penile swelling, penile discharge.        Review of Systems   Constitutional:  Negative for chills and fever.   HENT:  Negative for congestion and sore throat.    Eyes:  Negative for photophobia, pain, redness and visual disturbance.   Respiratory:  Negative for cough, chest tightness and shortness of breath.    Cardiovascular:  Negative for chest pain, palpitations and leg swelling.   Gastrointestinal:  Negative for abdominal pain, constipation, diarrhea, nausea and vomiting.   Genitourinary:  Negative for dysuria, flank pain, frequency, hematuria, testicular pain and urgency.        Bleeding from penile shaft status post surgery.   Musculoskeletal:  Negative for arthralgias, back pain, neck pain and neck stiffness.   Skin:  Negative for color change and rash.   Neurological:  Negative for dizziness, seizures, syncope, light-headedness, numbness and headaches.   All other systems reviewed and  are negative.          Objective     ED Triage Vitals   Temperature Pulse Blood Pressure Respirations SpO2 Patient Position - Orthostatic VS   09/20/24 1629 09/20/24 1629 09/20/24 1629 09/20/24 1629 09/20/24 1629 --   (!) 97.4 °F (36.3 °C) 80 165/80 18 94 %       Temp Source Heart Rate Source BP Location FiO2 (%) Pain Score    09/20/24 1629 09/20/24 1629 09/20/24 1629 -- 09/20/24 1706    Oral Monitor Right arm  1        Physical Exam  Vitals and nursing note reviewed.   Constitutional:       General: He is not in acute distress.     Appearance: He is well-developed. He is not toxic-appearing.   HENT:      Head: Normocephalic and atraumatic.      Right Ear: External ear normal.      Left Ear: External ear normal.      Nose: Nose normal. No congestion.      Mouth/Throat:      Mouth: Mucous membranes are moist.      Pharynx: Oropharynx is clear. No oropharyngeal exudate or posterior oropharyngeal erythema.   Eyes:      Extraocular Movements: Extraocular movements intact.      Conjunctiva/sclera: Conjunctivae normal.      Pupils: Pupils are equal, round, and reactive to light.   Cardiovascular:      Rate and Rhythm: Normal rate and regular rhythm.      Pulses: Normal pulses.      Heart sounds: Normal heart sounds. No murmur heard.     No friction rub. No gallop.   Pulmonary:      Effort: Pulmonary effort is normal. No respiratory distress.      Breath sounds: Normal breath sounds. No stridor. No wheezing, rhonchi or rales.   Abdominal:      General: Bowel sounds are normal.      Palpations: Abdomen is soft.      Tenderness: There is no abdominal tenderness. There is no right CVA tenderness, left CVA tenderness, guarding or rebound.   Genitourinary:     Comments: Small amount of active bleeding noted at penile shaft.  No penile pain.  No penile discharge.  No surrounding erythema, warmth, tenderness or swelling.  Lomeli catheter in place without hematuria noted in tubing or Lomeli bag.  SPT in place as well without  surrounding area of cellulitis, or hematuria in the tube or Lomeli bag.  Bilateral cremasteric reflex present.  No testicular swelling or pain.  No scrotal swelling or pain.  Musculoskeletal:         General: No swelling, tenderness or deformity. Normal range of motion.      Cervical back: Normal range of motion and neck supple. No rigidity or tenderness.      Right lower leg: No edema.      Left lower leg: No edema.   Lymphadenopathy:      Cervical: No cervical adenopathy.   Skin:     General: Skin is warm and dry.      Capillary Refill: Capillary refill takes less than 2 seconds.      Coloration: Skin is not jaundiced or pale.      Findings: No bruising, erythema, lesion or rash.   Neurological:      General: No focal deficit present.      Mental Status: He is alert and oriented to person, place, and time. Mental status is at baseline.      GCS: GCS eye subscore is 4. GCS verbal subscore is 5. GCS motor subscore is 6.      Cranial Nerves: Cranial nerves 2-12 are intact. No cranial nerve deficit, dysarthria or facial asymmetry.      Sensory: Sensation is intact. No sensory deficit.      Motor: Motor function is intact. No abnormal muscle tone or pronator drift.      Coordination: Coordination is intact.      Gait: Gait is intact.   Psychiatric:         Mood and Affect: Mood normal.         Behavior: Behavior normal.         Thought Content: Thought content normal.         Labs Reviewed - No data to display  No orders to display       Procedures    ED Medication and Procedure Management   Prior to Admission Medications   Prescriptions Last Dose Informant Patient Reported? Taking?   Apoaequorin (PREVAGEN PO)  Self, Spouse/Significant Other Yes No   Sig: Take 1 capsule by mouth in the morning   Nutritional Supplements (PROSTATE PO)  Self, Spouse/Significant Other Yes No   Sig: Take 1 Capful by mouth daily. Indications: Enlarged prostate   acetaminophen (TYLENOL) 325 mg tablet   No No   Sig: Take 2 tablets (650 mg  total) by mouth every 4 (four) hours as needed for mild pain   aspirin 81 mg chewable tablet  Self, Spouse/Significant Other Yes No   Sig: Chew 81 mg every 12 (twelve) hours   docusate sodium (COLACE) 100 mg capsule   No No   Sig: Take 1 capsule (100 mg total) by mouth 2 (two) times a day for 15 days   guaiFENesin (MUCINEX) 600 mg 12 hr tablet  Self, Spouse/Significant Other Yes No   Sig: Take 600 mg by mouth daily Pt taking 600mg daily   metoprolol succinate (TOPROL-XL) 25 mg 24 hr tablet   No No   Sig: TAKE 1 TABLET BY MOUTH EVERY 12 HOURS   multivitamin (THERAGRAN) TABS  Self, Spouse/Significant Other Yes No   Sig: Take 1 tablet by mouth daily   naproxen (NAPROSYN) 500 mg tablet   No No   Sig: Take 1 tablet (500 mg total) by mouth 2 (two) times a day with meals for 5 days For pain   nystatin (MYCOSTATIN) cream  Spouse/Significant Other, Self No No   Sig: Apply topically 2 (two) times a day   Patient taking differently: Apply 1 Application topically 2 (two) times a day Apply to open area on penis   omeprazole (PriLOSEC) 10 mg delayed release capsule  Self, Spouse/Significant Other Yes No   Sig: Take 20 mg by mouth daily   oxyCODONE (ROXICODONE) 5 immediate release tablet   No No   Sig: Take 1 tablet (5 mg total) by mouth every 4 (four) hours as needed for severe pain for up to 5 days Max Daily Amount: 30 mg      Facility-Administered Medications: None     Discharge Medication List as of 9/20/2024  5:00 PM        CONTINUE these medications which have NOT CHANGED    Details   acetaminophen (TYLENOL) 325 mg tablet Take 2 tablets (650 mg total) by mouth every 4 (four) hours as needed for mild pain, Starting Fri 9/20/2024, No Print      Apoaequorin (PREVAGEN PO) Take 1 capsule by mouth in the morning, Historical Med      aspirin 81 mg chewable tablet Chew 81 mg every 12 (twelve) hours, Historical Med      docusate sodium (COLACE) 100 mg capsule Take 1 capsule (100 mg total) by mouth 2 (two) times a day for 15 days,  Starting Fri 9/20/2024, Until Sat 10/5/2024, Normal      guaiFENesin (MUCINEX) 600 mg 12 hr tablet Take 600 mg by mouth daily Pt taking 600mg daily, Historical Med      metoprolol succinate (TOPROL-XL) 25 mg 24 hr tablet TAKE 1 TABLET BY MOUTH EVERY 12 HOURS, Starting Tue 9/10/2024, Normal      multivitamin (THERAGRAN) TABS Take 1 tablet by mouth daily, Historical Med      naproxen (NAPROSYN) 500 mg tablet Take 1 tablet (500 mg total) by mouth 2 (two) times a day with meals for 5 days For pain, Starting Fri 9/20/2024, Until Wed 9/25/2024, Normal      Nutritional Supplements (PROSTATE PO) Take 1 Capful by mouth daily. Indications: Enlarged prostate, Historical Med      nystatin (MYCOSTATIN) cream Apply topically 2 (two) times a day, Starting Fri 8/2/2024, Normal      omeprazole (PriLOSEC) 10 mg delayed release capsule Take 20 mg by mouth daily, Historical Med      oxyCODONE (ROXICODONE) 5 immediate release tablet Take 1 tablet (5 mg total) by mouth every 4 (four) hours as needed for severe pain for up to 5 days Max Daily Amount: 30 mg, Starting Fri 9/20/2024, Until Wed 9/25/2024 at 2359, Normal           No discharge procedures on file.     Ismael Ochoa DO  09/22/24 [id number]

## 2024-09-20 NOTE — ED ATTENDING ATTESTATION
9/20/2024  I, Kenan Shah MD, saw and evaluated the patient. I have discussed the patient with the resident/non-physician practitioner and agree with the resident's/non-physician practitioner's findings, Plan of Care, and MDM as documented in the resident's/non-physician practitioner's note, except where noted. All available labs and Radiology studies were reviewed.  I was present for key portions of any procedure(s) performed by the resident/non-physician practitioner and I was immediately available to provide assistance.       At this point I agree with the current assessment done in the Emergency Department.  I have conducted an independent evaluation of this patient a history and physical is as follows: Patient with cystoscopy dilation of stricture, urethral catheter placement performed today by urology.  Patient was discharged home following uneventful procedure.  He had development of bleeding around the indwelling Lomeli catheter.  No blood in the urine bag.  Appreciate urology recommendations for direct pressure with Ace wrap, discharge home.  Patient hemodynamically stable.  No active bleeding in ED.    Return precautions were discussed.  Per urology on call, okay for discharge home with Ace wrap around penis, follow-up with urology as previously scheduled.          ED Course         Critical Care Time  Procedures

## 2024-09-21 NOTE — TELEPHONE ENCOUNTER
Patient went to the ED and was evaluated.  Was discharged from the ED.  Please follow-up on patient

## 2024-09-24 NOTE — TELEPHONE ENCOUNTER
Attempted to call patient. Phone was  but there was no response to greeting.     If patient calls back please ask how he is feeling. Please confirm appointment scheduled for tomorrow for wiley removal and capping trial

## 2024-09-25 ENCOUNTER — PROCEDURE VISIT (OUTPATIENT)
Dept: UROLOGY | Facility: CLINIC | Age: 79
End: 2024-09-25

## 2024-09-25 DIAGNOSIS — R33.9 URINARY RETENTION: Primary | ICD-10-CM

## 2024-09-25 PROCEDURE — 99024 POSTOP FOLLOW-UP VISIT: CPT

## 2024-09-25 NOTE — PROGRESS NOTES
9/25/2024    Mathew Sloan Jr. is a 79 y.o. male  [id number]    Diagnosis:  Chief Complaint    Post-op         Patient presents for wiley removal and capping trial instructions s/p CYSTOSCOPY DILATION OF STRICTURE/MEATAL STENOSIS AND CATHETER PACEMENT (Urethra)  on 9/20/24 with Dr. Blandon    Plan:  Patient will perform capping trial and then follow up as scheduled in October     Procedure:  Discussed plan with patient and wife. Went over capping trial. They verbalized understanding and know he is to document all output from both urethra and SPT between today and his follow up appointment. He knows that if he doesn't urinate then he must at the least empty through the SPT site every 4 hours. Educated on the SPT plug. Educated on proper hygiene and care of the SPT.  Provided graduate cylinders for measuring and plugs     10 ml water removed from wiley catheter. Catheter removed with minimal discomfort to patient.     There were no vitals filed for this visit.      Shira Hudson RN

## 2024-09-26 ENCOUNTER — HOME CARE VISIT (OUTPATIENT)
Dept: HOME HEALTH SERVICES | Facility: HOME HEALTHCARE | Age: 79
End: 2024-09-26
Payer: MEDICARE

## 2024-10-11 ENCOUNTER — OFFICE VISIT (OUTPATIENT)
Dept: CARDIOLOGY CLINIC | Facility: CLINIC | Age: 79
End: 2024-10-11
Payer: MEDICARE

## 2024-10-11 VITALS
HEART RATE: 72 BPM | SYSTOLIC BLOOD PRESSURE: 120 MMHG | HEIGHT: 69 IN | WEIGHT: 185 LBS | DIASTOLIC BLOOD PRESSURE: 70 MMHG | BODY MASS INDEX: 27.4 KG/M2

## 2024-10-11 DIAGNOSIS — N18.30 BENIGN HYPERTENSION WITH CKD (CHRONIC KIDNEY DISEASE) STAGE III (HCC): ICD-10-CM

## 2024-10-11 DIAGNOSIS — I48.0 PAROXYSMAL ATRIAL FIBRILLATION (HCC): Primary | ICD-10-CM

## 2024-10-11 DIAGNOSIS — N18.32 STAGE 3B CHRONIC KIDNEY DISEASE (HCC): ICD-10-CM

## 2024-10-11 DIAGNOSIS — E66.3 OVERWEIGHT (BMI 25.0-29.9): ICD-10-CM

## 2024-10-11 DIAGNOSIS — R00.2 PALPITATIONS: ICD-10-CM

## 2024-10-11 DIAGNOSIS — I47.10 PSVT (PAROXYSMAL SUPRAVENTRICULAR TACHYCARDIA) (HCC): ICD-10-CM

## 2024-10-11 DIAGNOSIS — I12.9 BENIGN HYPERTENSION WITH CKD (CHRONIC KIDNEY DISEASE) STAGE III (HCC): ICD-10-CM

## 2024-10-11 PROCEDURE — 99214 OFFICE O/P EST MOD 30 MIN: CPT | Performed by: INTERNAL MEDICINE

## 2024-10-11 NOTE — PROGRESS NOTES
[facility] [place]  [address] [facility] [address] [phone]                                                Cardiology Office Follow up  Mathew Sloan Jr., 79 y.o. male  YOB: 1945  MRN: [id number] Encounter: [id number]      PCP - Nick Haskins, DO    Assessment  Paroxysmal Atrial Fibrillation   Paroxysmal SVT  Zio-patch - 9/2021 - 40 short SVT episodes, upto 22 beats (12 seconds), no Afib  PACs, Irregular heart beat  Hypertension  CKD  Creatinine remains stable around 2  Overweight, Body mass index is 27.32 kg/m².     Plan  Paroxysmal Atrial Fibrillation, Frequent PACs, PSVT  Overview  5/2021 - had atrial fibrillation for 3-4 hours on holter monitor, asymptomatic, feels it was after his Moderna vaccine  tried metoprolol but was too fatigued and as a result stopped  8/27/21 - had tachycardia noted on fitbit persistently for several hours, and went to the ED after taking metoprolol and aspirin  Initial ED ECG was NSR  He has started back on metoprolol succinate 25 mg b.i.d. since then, and has been otherwise feeling well  9/2021 - Zio patch  no further AFib, but frequent short, asymptomatic SVT episodes - upto 12 seconds  He was switched back from Xarelto to aspirin after this  2/2023 - he continues to do well and has not had any further episodes of atrial fibrillation over the last 2 years, although does occasionally have brief tachycardia   8/2023: Remains free of any major palpitations, and has overall been doing well without any major limitations.  He does have the cardiac monitor at home but continues to sit in a box and he has not used it at all  4/2024: no major palpitations.  No A-fib during his UTI hospitalization recently.  10/2024: no palpitations.   Impression  No recurrent Afib episodes, well controlled PAT/PVC, remains off AC  Plan  Continue current therapy with metoprolol succinate 25 mg twice daily,  aspirin 81 mg daily     Hypertension with CKD-3b  Well-controlled, 120/70  Creatinine slowly rising up to 2.37 now  Continue metoprolol 25 mg twice daily   Continue good hydration  Follow-up with nephrologist     Hyperlipidemia, overweight - Body mass index is 27.32 kg/m².     Cholesterol levels reasonably controlled, triglycerides mildly elevated and LDL also mildly elevated  Counseled again regarding dietary modifications and stool (  Monitor and follow-up labs    ECG today -  No results found for this visit on 10/11/24.       No orders of the defined types were placed in this encounter.    Return in about 6 months (around 4/11/2025), or if symptoms worsen or fail to improve.      History of Present Illness   79 y.o. male comes in as a new patient for evaluation of irregular heartbeat.    He is in his usual state of health, and does not report any major symptoms of chest pain, palpitations, shortness of breath.  He recently received his COVID vaccination, and after this was monitoring his pulse ox, and at this time he incidentally noted some irregular heartbeat.  He continued to have some irregular heartbeats, and as a result went to the ED for evaluation.  In the ED, he was noted to have sinus rhythm with PACs on ECG, and was otherwise monitored overnight and discharged.    Since discharge, he continues to be symptom free and has not had any major episodes of heart racing or palpitations.  He states that his heart rate goes up to 120 with activity.  No history of dizziness, near-syncope or syncope.      Interval history - 8/30/2021  He comes back for follow-up after about 2 months.  He had otherwise been feeling well since discontinuing his metoprolol, with improvement in his fatigue.  As a result, he wanted to hold off on any testing, and see if xarelto also could be stopped.  But per my discussion with him through my chart messages, I had asked him to continue Xarelto as his AFib episodes have been previously  asymptomatic, while using metoprolol p.r.n..  Unfortunately, on 08/27/2021, he again has had tachycardia noted on his Fitbit, which persisted for several hours, and as a result he decided to go to the ED.  By the time EMS arrived, he had already taken aspirin and metoprolol, and with this is heart rate seem to have improved.   He was observed in the ED for 3 hours, and was discharged to home with resumption of metoprolol at prior doses.   no further complains of dizziness or is severe fatigue since then.  No complains of dizziness or lightheadedness.    Interval history - 11/11/2021  He comes back for follow-up after about 3 months.  He completed the 2 week Zio patch monitor, and did not have any atrial fibrillation episodes on the same.  He continues to feel well otherwise.  No complains of chest pain, shortness of breath.    Interval history - 2/14/2022  He comes back for follow-up after about 3 months.  He has not had any further symptoms of persistent palpitations.  No complains of dizziness or lightheadedness, near-syncope or syncope.  He reports significant anorexia over the past few months, and has lost about 7 lb    Interval history - 8/30/2022  He comes back for follow-up after about 6 months.  He has overall been doing well and has not had any major recurrences of palpitations.  He does continue to monitor his heart rate with a Fitbit.  He was more active doing 6000 steps/day until a couple of weeks ago    Interval history - 10/19/2022  He comes back for follow-up after about 2 months.  He is here for an earlier visit due to concerns about his heart rate going quite slow at night, into the mid 40s on his Fitbit.  No other symptoms of dizziness or lightheadedness, near-syncope or syncope.  No symptoms of palpitations, or any persistent tachycardia.    Interval history - 2/15/2023  He comes back for follow-up after about 4 months.  Is been doing well overall and has not had any major issues with chest pain,  shortness of breath, or palpitations.  He continues to monitor himself on his Fitbit and his heart rate remains in the 60s to 70s on average.  No complaints of dizziness or lightness, near-syncope.  He remains reasonably active.    Interval history - 8/1/2023  He comes back for follow-up after about 6 months.  He continues to do well and has not had any issues with chest pain or shortness of breath, palpitations or dizziness.  He remains ambulatory, but has a right foot weakness with slight foot drop limiting his ambulation.  He has been unwilling to do anything regarding the same as well.    Interval history - 4/9/2024  He returns for 6 monthly follow up. In 2/2024, he was in the hospital with a complicated UTI, including symptoms of hematuria.  He was treated with IV antibiotics for 2 weeks, and has subsequently had improvement thereafter.  He reports not having had any further bleeding thereafter.  He was supposed to follow-up with urologist regarding this hematuria, but he has decided to not see them as he has not had further problems.     Interval history - 10/11/2024  He returns for 6 month follow up. He has been dealing with UTI over the past few months. He recently had cystoscopy and dilatation of anterior ureteral stricture, after which he had bleeding issues for which he went to the ED. he is following up with urologist regarding these issues.  He otherwise remains free of chest pain, palpitations, shortness of breath.    Historical Information   Past Medical History:   Diagnosis Date    Abnormal ECG 20 FEB 2021    Skipped heart beats after 2nd Moderna shot.    Asthma     Cancer (HCC)     basal cell- shoulder right    CKD (chronic kidney disease)     GERD (gastroesophageal reflux disease)     Hiatal hernia     Hypercholesteremia     Hypertension     Kidney stone     Paroxysmal A-fib (HCC)     Paroxysmal SVT (supraventricular tachycardia) (HCC)     Pneumonia     Shoulder dislocation      Past Surgical  History:   Procedure Laterality Date    CYST REMOVAL      IR SUPRAPUBIC CATHETER PLACEMENT  07/28/2024    IR TUNNELED CENTRAL LINE PLACEMENT  02/21/2024    removed    OTHER SURGICAL HISTORY      thyroglossal cyst surgery x 2    WA CYSTO CALIBRATION DILAT URTL STRIX/STENOSIS N/A 9/20/2024    Procedure: CYSTOSCOPY DILATION OF STRICTURE/MEATAL STENOSIS AND CATHETER PACEMENT;  Surgeon: Shade Blandon MD;  Location: [facility];  Service: Urology    TONSILLECTOMY       Family History   Problem Relation Age of Onset    Diabetes Mother     No Known Problems Father      Current Outpatient Medications on File Prior to Visit   Medication Sig Dispense Refill    acetaminophen (TYLENOL) 325 mg tablet Take 2 tablets (650 mg total) by mouth every 4 (four) hours as needed for mild pain      Apoaequorin (PREVAGEN PO) Take 1 capsule by mouth in the morning      aspirin 81 mg chewable tablet Chew 81 mg every 12 (twelve) hours      docusate sodium (COLACE) 100 mg capsule Take 1 capsule (100 mg total) by mouth 2 (two) times a day for 15 days 30 capsule 0    guaiFENesin (MUCINEX) 600 mg 12 hr tablet Take 600 mg by mouth daily Pt taking 600mg daily      metoprolol succinate (TOPROL-XL) 25 mg 24 hr tablet TAKE 1 TABLET BY MOUTH EVERY 12 HOURS 180 tablet 1    multivitamin (THERAGRAN) TABS Take 1 tablet by mouth daily      Nutritional Supplements (PROSTATE PO) Take 1 Capful by mouth daily. Indications: Enlarged prostate      omeprazole (PriLOSEC) 10 mg delayed release capsule Take 20 mg by mouth daily      naproxen (NAPROSYN) 500 mg tablet Take 1 tablet (500 mg total) by mouth 2 (two) times a day with meals for 5 days For pain 10 tablet 0    nystatin (MYCOSTATIN) cream Apply topically 2 (two) times a day (Patient not taking: Reported on 10/11/2024) 30 g 1     No current facility-administered medications on file prior to visit.     Allergies   Allergen Reactions    Chlorpheniramine Other (See Comments)    Phenylephrine Other (See  "Comments)     Social History     Socioeconomic History    Marital status: /Civil Union     Spouse name: None    Number of children: None    Years of education: None    Highest education level: None   Occupational History    None   Tobacco Use    Smoking status: Never    Smokeless tobacco: Never   Vaping Use    Vaping status: Never Used   Substance and Sexual Activity    Alcohol use: Not Currently     Comment: social    Drug use: Never    Sexual activity: Not Currently     Partners: Female   Other Topics Concern    None   Social History Narrative    None     Social Determinants of Health     Financial Resource Strain: Not on file   Food Insecurity: No Food Insecurity (7/29/2024)    Hunger Vital Sign     Worried About Running Out of Food in the Last Year: Never true     Ran Out of Food in the Last Year: Never true   Transportation Needs: No Transportation Needs (7/29/2024)    PRAPARE - Transportation     Lack of Transportation (Medical): No     Lack of Transportation (Non-Medical): No   Physical Activity: Not on file   Stress: Not on file   Social Connections: Not on file   Intimate Partner Violence: Not on file   Housing Stability: Low Risk  (7/29/2024)    Housing Stability Vital Sign     Unable to Pay for Housing in the Last Year: No     Number of Times Moved in the Last Year: 0     Homeless in the Last Year: No        Review of Systems   All other systems reviewed and are negative.      Vitals:  Vitals:    10/11/24 1250   BP: 120/70   Pulse: 72   Weight: 83.9 kg (185 lb)   Height: 5' 9\" (1.753 m)     BMI - Body mass index is 27.32 kg/m².  Wt Readings from Last 7 Encounters:   10/11/24 83.9 kg (185 lb)   09/20/24 82.6 kg (182 lb)   08/16/24 84.4 kg (186 lb)   08/02/24 83.5 kg (184 lb)   07/27/24 85.8 kg (189 lb 2.5 oz)   04/09/24 83.5 kg (184 lb)   02/18/24 85.3 kg (188 lb)       Physical Exam  Vitals and nursing note reviewed.   Constitutional:       General: He is not in acute distress.     Appearance: " "Normal appearance. He is well-developed. He is not ill-appearing or diaphoretic.   HENT:      Head: Normocephalic and atraumatic.      Nose: No congestion.   Eyes:      General: No scleral icterus.     Conjunctiva/sclera: Conjunctivae normal.   Neck:      Vascular: No carotid bruit or JVD.   Cardiovascular:      Rate and Rhythm: Normal rate and regular rhythm.      Heart sounds: Normal heart sounds. No murmur heard.     No friction rub. No gallop.   Pulmonary:      Effort: Pulmonary effort is normal. No respiratory distress.      Breath sounds: Normal breath sounds. No wheezing or rales.   Chest:      Chest wall: No tenderness.   Abdominal:      General: There is no distension.      Palpations: Abdomen is soft.      Tenderness: There is no abdominal tenderness.   Musculoskeletal:         General: No swelling, tenderness or deformity.      Cervical back: Neck supple. No muscular tenderness.      Right lower leg: No edema.      Left lower leg: No edema.   Skin:     General: Skin is warm.   Neurological:      General: No focal deficit present.      Mental Status: He is alert and oriented to person, place, and time. Mental status is at baseline.   Psychiatric:         Mood and Affect: Mood normal.         Behavior: Behavior normal.         Thought Content: Thought content normal.         Labs:  CBC:   Lab Results   Component Value Date    WBC 9.95 07/29/2024    RBC 3.97 07/29/2024    HGB 12.1 07/29/2024    HCT 38.0 07/29/2024    MCV 96 07/29/2024     07/29/2024    RDW 13.4 07/29/2024       CMP:   Lab Results   Component Value Date    K 3.5 07/29/2024     07/29/2024    CO2 24 07/29/2024    BUN 29 (H) 07/29/2024    CREATININE 2.39 (H) 07/29/2024    EGFR 24 07/29/2024    CALCIUM 8.7 07/29/2024    AST 17 07/27/2024    ALT 9 07/27/2024    ALKPHOS 55 07/27/2024       Magnesium:  No results found for: \"MG\"    Lipid Profile:   Lab Results   Component Value Date    HDL 38 (L) 04/05/2024    TRIG 193 (H) 04/05/2024    " "LDLCALC 109 (H) 04/05/2024       Thyroid Studies:   Lab Results   Component Value Date    CYW4DPKSRGRE 2.019 04/05/2024    FREET4 0.61 04/05/2024    V8OIRJR 8.79 04/05/2024       No components found for: \"HGA1C\"    Lab Results   Component Value Date    INR 1.05 07/28/2024    INR 0.98 02/21/2024    INR 1.01 08/27/2021   5    Imaging: No results found.    Cardiac testing:   No results found for this or any previous visit.  No results found for this or any previous visit.  No results found for this or any previous visit.  No results found for this or any previous visit.    "

## 2024-10-16 ENCOUNTER — OFFICE VISIT (OUTPATIENT)
Dept: UROLOGY | Facility: CLINIC | Age: 79
End: 2024-10-16
Payer: MEDICARE

## 2024-10-16 VITALS
SYSTOLIC BLOOD PRESSURE: 118 MMHG | HEIGHT: 69 IN | DIASTOLIC BLOOD PRESSURE: 80 MMHG | WEIGHT: 188 LBS | OXYGEN SATURATION: 96 % | HEART RATE: 73 BPM | BODY MASS INDEX: 27.85 KG/M2

## 2024-10-16 DIAGNOSIS — N13.8 BPH WITH OBSTRUCTION/LOWER URINARY TRACT SYMPTOMS: Primary | ICD-10-CM

## 2024-10-16 DIAGNOSIS — N40.1 BPH WITH OBSTRUCTION/LOWER URINARY TRACT SYMPTOMS: Primary | ICD-10-CM

## 2024-10-16 PROCEDURE — 99213 OFFICE O/P EST LOW 20 MIN: CPT | Performed by: PHYSICIAN ASSISTANT

## 2024-10-16 RX ORDER — FINASTERIDE 5 MG/1
5 TABLET, FILM COATED ORAL DAILY
Qty: 90 TABLET | Refills: 3 | Status: SHIPPED | OUTPATIENT
Start: 2024-10-16

## 2024-10-16 RX ORDER — TAMSULOSIN HYDROCHLORIDE 0.4 MG/1
0.4 CAPSULE ORAL
Qty: 90 CAPSULE | Refills: 3 | Status: SHIPPED | OUTPATIENT
Start: 2024-10-16

## 2024-10-16 NOTE — PROGRESS NOTES
UROLOGY PROGRESS NOTE   Patient Identifiers: Mathew Sloan (MRN [id number])  Date of Service: 10/16/2024    Subjective:   79-year-old man with history of meatal stenosis requiring a suprapubic catheter.  He had a cystoscopy and dilation under anesthesia and is now here for follow-up.  He reports voiding through the urethra but also having residual from the suprapubic catheter.  His cystoscopy showed moderate BPH.    Reason for visit: Meatal stenosis and BPH follow-up    Objective:     VITALS:    There were no vitals filed for this visit.  AUA SYMPTOM SCORE      Flowsheet Row Most Recent Value   AUA SYMPTOM SCORE    How often have you had a sensation of not emptying your bladder completely after you finished urinating? 1 (P)     How often have you had to urinate again less than two hours after you finished urinating? 3 (P)     How often have you found you stopped and started again several times when you urinate? 1 (P)     How often have you found it difficult to postpone urination? 0 (P)     How often have you had a weak urinary stream? 3 (P)     How often have you had to push or strain to begin urination? 1 (P)     How many times did you most typically get up to urinate from the time you went to bed at night until the time you got up in the morning? 2 (P)     Quality of Life: If you were to spend the rest of your life with your urinary condition just the way it is now, how would you feel about that? 4 (P)     AUA SYMPTOM SCORE 11 (P)                LABS:  Lab Results   Component Value Date    HGB 12.1 07/29/2024    HCT 38.0 07/29/2024    WBC 9.95 07/29/2024     07/29/2024   ]    Lab Results   Component Value Date    K 3.5 07/29/2024     07/29/2024    CO2 24 07/29/2024    BUN 29 (H) 07/29/2024    CREATININE 2.39 (H) 07/29/2024    CALCIUM 8.7 07/29/2024   ]        INPATIENT MEDS:    Current Outpatient Medications:     acetaminophen (TYLENOL) 325 mg tablet, Take 2 tablets (650 mg total) by mouth every 4  (four) hours as needed for mild pain, Disp: , Rfl:     Apoaequorin (PREVAGEN PO), Take 1 capsule by mouth in the morning, Disp: , Rfl:     aspirin 81 mg chewable tablet, Chew 81 mg every 12 (twelve) hours, Disp: , Rfl:     docusate sodium (COLACE) 100 mg capsule, Take 1 capsule (100 mg total) by mouth 2 (two) times a day for 15 days, Disp: 30 capsule, Rfl: 0    guaiFENesin (MUCINEX) 600 mg 12 hr tablet, Take 600 mg by mouth daily Pt taking 600mg daily, Disp: , Rfl:     metoprolol succinate (TOPROL-XL) 25 mg 24 hr tablet, TAKE 1 TABLET BY MOUTH EVERY 12 HOURS, Disp: 180 tablet, Rfl: 1    multivitamin (THERAGRAN) TABS, Take 1 tablet by mouth daily, Disp: , Rfl:     naproxen (NAPROSYN) 500 mg tablet, Take 1 tablet (500 mg total) by mouth 2 (two) times a day with meals for 5 days For pain, Disp: 10 tablet, Rfl: 0    Nutritional Supplements (PROSTATE PO), Take 1 Capful by mouth daily. Indications: Enlarged prostate, Disp: , Rfl:     nystatin (MYCOSTATIN) cream, Apply topically 2 (two) times a day (Patient not taking: Reported on 10/11/2024), Disp: 30 g, Rfl: 1    omeprazole (PriLOSEC) 10 mg delayed release capsule, Take 20 mg by mouth daily, Disp: , Rfl:       Physical Exam:   There were no vitals taken for this visit.  GEN: no acute distress    RESP: breathing comfortably with no accessory muscle use    ABD: soft, non-tender, non-distended   INCISION:    EXT: no significant peripheral edema       RADIOLOGY:   none     Assessment:   #1.  Meatal stenosis  #2.  BPH    Plan:   -I put him on finasteride and tamsulosin  -Will consider removing his SP catheter in about 3 to 4 weeks at his next catheter change if he has a acceptable residual volume  -  -

## 2024-10-18 ENCOUNTER — TELEPHONE (OUTPATIENT)
Age: 79
End: 2024-10-18

## 2024-10-18 NOTE — TELEPHONE ENCOUNTER
Patient seen by Jn at [facility] [facility]    Patient called stating he saw Jn and he got  Two medications . Tamsulosin and finasteride.  Patient took tamsulosin after dinner around 630 pm yesterday.  He stated he ended up having leakage during the night.   He felt his heartbeat was slowing down.  He did not have any leakage before taking this medication.   He took the finasteride this morning .  He stating he is not sure if he is still having symptoms with having to breathe through his nose.  He feels lethargic.   He would like to know if he should stop taking them.       Patient can be reached at [phone]

## 2024-10-18 NOTE — TELEPHONE ENCOUNTER
Karen's message relayed to pt. ER precautions reviewed. Pt stated he will reach out to office next week to let us know if he feels better after stopping the medication.

## 2024-11-08 ENCOUNTER — PROCEDURE VISIT (OUTPATIENT)
Dept: UROLOGY | Facility: CLINIC | Age: 79
End: 2024-11-08
Payer: MEDICARE

## 2024-11-08 VITALS
DIASTOLIC BLOOD PRESSURE: 80 MMHG | HEIGHT: 69 IN | HEART RATE: 76 BPM | OXYGEN SATURATION: 94 % | SYSTOLIC BLOOD PRESSURE: 122 MMHG | BODY MASS INDEX: 27.76 KG/M2

## 2024-11-08 DIAGNOSIS — R33.9 URINARY RETENTION: Primary | ICD-10-CM

## 2024-11-08 PROCEDURE — 51705 CHANGE OF BLADDER TUBE: CPT

## 2024-11-08 NOTE — PROGRESS NOTES
"11/8/2024    Mathew Sloan Jr.  1945  [id number]    Diagnosis  Chief Complaint    spt exchange          Patient presents for spt exchange managed by our office    Plan  Plan to have capping trial and follow up with nurses on 11/27.     Procedure: Suprapubic Tube Change       Cystostomy tube change     Date/Time  11/8/2024 1:00 PM     Performed by  Lolly Lorenz RN   Authorized by  Rico Soler MD     [place] Protocol   procedure performed by consultantConsent: Verbal consent obtained.  Risks and benefits: risks, benefits and alternatives were discussed  Consent given by: patient  Patient understanding: patient states understanding of the procedure being performed  Patient consent: the patient's understanding of the procedure matches consent given  Procedure consent: procedure consent matches procedure scheduled  Patient identity confirmed: verbally with patient      Local anesthesia used: no     Anesthesia   Local anesthesia used: no     Procedure Details   Patient tolerance: patient tolerated the procedure well with no immediate complications               Current catheter removed without difficulty after deflation of an intact balloon. Site prepped with Betadine, new 18F  suprapubic spt change via aseptic technique without incident, 10 ml balloon inflated with sterile water. irrigated easily for straw-yellow return, mild spasm noted. Patient tolerated well. Attached to drainage bag.       Pt initially presented to office thinking he would get his SPT removed. Per Jn HERRERA's last note, pt was to have a capping trial. Pt did not record any data from capping trial and therefore, the spt could not be removed. Checked with Jn and new appointment made for 11/17. Directions of capping trial clearly explained to pt. Catheter was then exchanged.     Vitals:    11/08/24 1250   BP: 122/80   Pulse: 76   SpO2: 94%   Height: 5' 9\" (1.753 m)         Lolly Lorenz RN   "

## 2024-11-27 ENCOUNTER — PROCEDURE VISIT (OUTPATIENT)
Dept: UROLOGY | Facility: CLINIC | Age: 79
End: 2024-11-27
Payer: MEDICARE

## 2024-11-27 VITALS
BODY MASS INDEX: 27.85 KG/M2 | HEART RATE: 73 BPM | DIASTOLIC BLOOD PRESSURE: 78 MMHG | SYSTOLIC BLOOD PRESSURE: 138 MMHG | OXYGEN SATURATION: 97 % | HEIGHT: 69 IN | WEIGHT: 188 LBS

## 2024-11-27 DIAGNOSIS — Z93.59 SUPRAPUBIC CATHETER (HCC): Primary | ICD-10-CM

## 2024-11-27 PROCEDURE — 99211 OFF/OP EST MAY X REQ PHY/QHP: CPT

## 2024-11-27 NOTE — PROGRESS NOTES
Pt presents to office post SPT capping trial. Pt recorded urinary output data and PVR data at home, scanned into chart. Data shown to Jn Soto PA-C and he advised that pt's SPT can come out and pt should follow up in 3 months w/ AP. Appt scheduled for 2/28/25.     Uncapped pt's SPT and drained pt's bladder, residual measured approx. 15mL. 10mL sterile water removed from catheter balloon and catheter was then removed without issue. Site was covered with ABD pad and secured with tape.     Both pt and pt's wife were educated on expectations post SPT removal. Advised them to keep site clean and cover with gauze. They were told that pt will experience leakage from the spt site and that will stop as the hole closes. Pt verbalized understanding. Pt sent home with extra ABD pads and gauze. Pt c/o of some irritation at the tip of penis from wearing pads for leakage, advised him to use Aquaphor. He will try this. Advised pt to call office with any questions or concerns. ER precautions also reviewed with pt.           Vitals:    11/27/24 1240   BP: 138/78   Pulse: 73   SpO2: 97%

## 2025-02-10 ENCOUNTER — TELEPHONE (OUTPATIENT)
Age: 80
End: 2025-02-10

## 2025-03-01 DIAGNOSIS — I48.0 PAROXYSMAL ATRIAL FIBRILLATION (HCC): ICD-10-CM

## 2025-03-01 DIAGNOSIS — R00.2 PALPITATIONS: ICD-10-CM

## 2025-03-03 RX ORDER — METOPROLOL SUCCINATE 25 MG/1
25 TABLET, EXTENDED RELEASE ORAL 2 TIMES DAILY
Qty: 180 TABLET | Refills: 1 | Status: SHIPPED | OUTPATIENT
Start: 2025-03-03

## 2025-04-11 ENCOUNTER — TELEPHONE (OUTPATIENT)
Dept: CARDIOLOGY CLINIC | Facility: CLINIC | Age: 80
End: 2025-04-11

## 2025-04-11 ENCOUNTER — OFFICE VISIT (OUTPATIENT)
Dept: CARDIOLOGY CLINIC | Facility: CLINIC | Age: 80
End: 2025-04-11
Payer: MEDICARE

## 2025-04-11 VITALS
HEART RATE: 73 BPM | SYSTOLIC BLOOD PRESSURE: 110 MMHG | HEIGHT: 69 IN | WEIGHT: 190.2 LBS | DIASTOLIC BLOOD PRESSURE: 76 MMHG | BODY MASS INDEX: 28.17 KG/M2

## 2025-04-11 DIAGNOSIS — E66.3 OVERWEIGHT (BMI 25.0-29.9): ICD-10-CM

## 2025-04-11 DIAGNOSIS — E78.2 MIXED HYPERLIPIDEMIA: ICD-10-CM

## 2025-04-11 DIAGNOSIS — N18.4 CHRONIC KIDNEY DISEASE, STAGE 4 (SEVERE) (HCC): ICD-10-CM

## 2025-04-11 DIAGNOSIS — N18.30 BENIGN HYPERTENSION WITH CKD (CHRONIC KIDNEY DISEASE) STAGE III (HCC): ICD-10-CM

## 2025-04-11 DIAGNOSIS — N18.32 STAGE 3B CHRONIC KIDNEY DISEASE (HCC): ICD-10-CM

## 2025-04-11 DIAGNOSIS — I12.9 BENIGN HYPERTENSION WITH CKD (CHRONIC KIDNEY DISEASE) STAGE III (HCC): ICD-10-CM

## 2025-04-11 DIAGNOSIS — I10 ESSENTIAL HYPERTENSION: Primary | ICD-10-CM

## 2025-04-11 DIAGNOSIS — I48.0 PAROXYSMAL ATRIAL FIBRILLATION (HCC): ICD-10-CM

## 2025-04-11 PROCEDURE — 99214 OFFICE O/P EST MOD 30 MIN: CPT | Performed by: INTERNAL MEDICINE

## 2025-04-11 PROCEDURE — 93000 ELECTROCARDIOGRAM COMPLETE: CPT | Performed by: INTERNAL MEDICINE

## 2025-04-11 NOTE — PROGRESS NOTES
[facility] [place] [address] [facility] [address] [phone]                                                Cardiology Office Follow up  Mathew Sloan Jr., 79 y.o. male  YOB: 1945  MRN: [id number] Encounter: [id number]      PCP - Nick Haskins, DO    Assessment  Paroxysmal Atrial Fibrillation   Paroxysmal SVT  Zio-patch - 9/2021 - 40 short SVT episodes, upto 22 beats (12 seconds), no Afib  PACs, Irregular heart beat  Hypertension  CKD   Cr 2-2.4  Overweight, Body mass index is 28.09 kg/m².     Plan  Paroxysmal Atrial Fibrillation, Frequent PACs, PSVT  Overview  5/2021 - had atrial fibrillation for 3-4 hours on holter monitor, asymptomatic, feels it was after his Moderna vaccine  tried metoprolol but was too fatigued and as a result stopped  8/27/21 - had tachycardia noted on fitbit persistently for several hours, and went to the ED after taking metoprolol and aspirin  Initial ED ECG was NSR  He has started back on metoprolol succinate 25 mg b.i.d. since then, and has been otherwise feeling well  9/2021 - Zio patch  no further AFib, but frequent short, asymptomatic SVT episodes - upto 12 seconds  He was switched back from Xarelto to aspirin after this  2/2023 - he continues to do well and has not had any further episodes of atrial fibrillation over the last 2 years, although does occasionally have brief tachycardia   8/2023: Remains free of any major palpitations, and has overall been doing well without any major limitations.  He does have the cardiac monitor at home but continues to sit in a box and he has not used it at all  4/2024: no major palpitations.  No A-fib during his UTI hospitalization recently.  10/2024: no palpitations.   4/2025: no palpitations, remains in NSR. No longer taking aspirin regularly - takes once weekly  Impression  Single known Afib episode in 2021  No recurrent Afib episodes, well controlled  PAT/PVC  remains off AC  Plan  Monitor clinically for symptoms  Continue metoprolol succinate 25 mg twice daily   Not on anticoagulation, due to lack of recurrence of A-fib    Hypertension with CKD-3b  Well-controlled, 110/76  Continue metoprolol succinate 25 mg twice daily  Creatinine remains elevated 2-2.4, and he follows with nephrologist for this  Monitor BMP  Low-sodium diet     Hyperlipidemia, overweight - Body mass index is 28.09 kg/m².     No recent lipid panel available, triglycerides and LDL have been elevated in the past  Not on any medications for it currently, he prefers to avoid on medications   With lack of clinical coronary artery disease, history of stroke or TIA, can continue to monitor with dietary modifications and weight loss  Check CMP, lipid panel     ECG today -  Results for orders placed or performed in visit on 04/11/25   POCT ECG    Impression    normal sinus rhythm  RSR' in V1 - consider RV conduction delay or incomplete RBBB          Orders Placed This Encounter   Procedures   • Lipid Panel with Direct LDL reflex   • Comprehensive metabolic panel   • POCT ECG     Return in about 1 year (around 4/11/2026), or if symptoms worsen or fail to improve.      History of Present Illness   79 y.o. male comes in as a new patient for evaluation of irregular heartbeat.    He is in his usual state of health, and does not report any major symptoms of chest pain, palpitations, shortness of breath.  He recently received his COVID vaccination, and after this was monitoring his pulse ox, and at this time he incidentally noted some irregular heartbeat.  He continued to have some irregular heartbeats, and as a result went to the ED for evaluation.  In the ED, he was noted to have sinus rhythm with PACs on ECG, and was otherwise monitored overnight and discharged.    Since discharge, he continues to be symptom free and has not had any major episodes of heart racing or palpitations.  He states that his heart  rate goes up to 120 with activity.  No history of dizziness, near-syncope or syncope.      Family history  Father -  at early 60s of suicide, had aortic aneurysm complications?, related to [place] war  Mother -  at 89, CHF, no known CAD or heart surgeries    Interval history - 2021  He comes back for follow-up after about 2 months.  He had otherwise been feeling well since discontinuing his metoprolol, with improvement in his fatigue.  As a result, he wanted to hold off on any testing, and see if xarelto also could be stopped.  But per my discussion with him through my chart messages, I had asked him to continue Xarelto as his AFib episodes have been previously asymptomatic, while using metoprolol p.r.n..  Unfortunately, on 2021, he again has had tachycardia noted on his Fitbit, which persisted for several hours, and as a result he decided to go to the ED.  By the time EMS arrived, he had already taken aspirin and metoprolol, and with this is heart rate seem to have improved.   He was observed in the ED for 3 hours, and was discharged to home with resumption of metoprolol at prior doses.   no further complains of dizziness or is severe fatigue since then.  No complains of dizziness or lightheadedness.    Interval history - 2021  He comes back for follow-up after about 3 months.  He completed the 2 week Zio patch monitor, and did not have any atrial fibrillation episodes on the same.  He continues to feel well otherwise.  No complains of chest pain, shortness of breath.    Interval history - 2022  He comes back for follow-up after about 3 months.  He has not had any further symptoms of persistent palpitations.  No complains of dizziness or lightheadedness, near-syncope or syncope.  He reports significant anorexia over the past few months, and has lost about 7 lb    Interval history - 2022  He comes back for follow-up after about 6 months.  He has overall been doing well and has not  had any major recurrences of palpitations.  He does continue to monitor his heart rate with a Fitbit.  He was more active doing 6000 steps/day until a couple of weeks ago    Interval history - 10/19/2022  He comes back for follow-up after about 2 months.  He is here for an earlier visit due to concerns about his heart rate going quite slow at night, into the mid 40s on his Fitbit.  No other symptoms of dizziness or lightheadedness, near-syncope or syncope.  No symptoms of palpitations, or any persistent tachycardia.    Interval history - 2/15/2023  He comes back for follow-up after about 4 months.  Is been doing well overall and has not had any major issues with chest pain, shortness of breath, or palpitations.  He continues to monitor himself on his Fitbit and his heart rate remains in the 60s to 70s on average.  No complaints of dizziness or lightness, near-syncope.  He remains reasonably active.    Interval history - 8/1/2023  He comes back for follow-up after about 6 months.  He continues to do well and has not had any issues with chest pain or shortness of breath, palpitations or dizziness.  He remains ambulatory, but has a right foot weakness with slight foot drop limiting his ambulation.  He has been unwilling to do anything regarding the same as well.    Interval history - 4/9/2024  He returns for 6 monthly follow up. In 2/2024, he was in the hospital with a complicated UTI, including symptoms of hematuria.  He was treated with IV antibiotics for 2 weeks, and has subsequently had improvement thereafter.  He reports not having had any further bleeding thereafter.  He was supposed to follow-up with urologist regarding this hematuria, but he has decided to not see them as he has not had further problems.     Interval history - 10/11/2024  He returns for 6 month follow up. He has been dealing with UTI over the past few months. He recently had cystoscopy and dilatation of anterior ureteral stricture, after which  he had bleeding issues for which he went to the ED. he is following up with urologist regarding these issues.  He otherwise remains free of chest pain, palpitations, shortness of breath.    Interval history - 4/11/2025  He remains free of chest pain, shortness of breath, palpitations or dizziness. He remains reasonably active. He is continuing to recover from bronchitis.       Historical Information   Past Medical History:   Diagnosis Date   • Abnormal ECG 20 FEB 2021    Skipped heart beats after 2nd Moderna shot.   • Asthma    • Benign hypertension with CKD (chronic kidney disease) stage III (HCC)    • Cancer (HCC)     basal cell- shoulder right   • CKD (chronic kidney disease)    • GERD (gastroesophageal reflux disease)    • Hiatal hernia    • Hypercholesteremia    • Hypertension    • Kidney stone    • Paroxysmal A-fib (HCC)    • Paroxysmal atrial fibrillation (HCC)    • Paroxysmal SVT (supraventricular tachycardia) (HCC)    • Pneumonia    • PSVT (paroxysmal supraventricular tachycardia) (HCC)    • Shoulder dislocation      Past Surgical History:   Procedure Laterality Date   • CYST REMOVAL     • IR SUPRAPUBIC CATHETER PLACEMENT  07/28/2024   • IR TUNNELED CENTRAL LINE PLACEMENT  02/21/2024    removed   • OTHER SURGICAL HISTORY      thyroglossal cyst surgery x 2   • NE CYSTO CALIBRATION DILAT URTL STRIX/STENOSIS N/A 9/20/2024    Procedure: CYSTOSCOPY DILATION OF STRICTURE/MEATAL STENOSIS AND CATHETER PACEMENT;  Surgeon: Shade Blandon MD;  Location: [facility];  Service: Urology   • TONSILLECTOMY       Family History   Problem Relation Age of Onset   • Diabetes Mother    • No Known Problems Father      Current Outpatient Medications on File Prior to Visit   Medication Sig Dispense Refill   • acetaminophen (TYLENOL) 325 mg tablet Take 2 tablets (650 mg total) by mouth every 4 (four) hours as needed for mild pain     • Apoaequorin (PREVAGEN PO) Take 1 capsule by mouth in the morning     • guaiFENesin (MUCINEX)  600 mg 12 hr tablet Take 600 mg by mouth daily Pt taking 600mg daily     • metoprolol succinate (TOPROL-XL) 25 mg 24 hr tablet TAKE 1 TABLET BY MOUTH EVERY 12 HOURS 180 tablet 1   • multivitamin (THERAGRAN) TABS Take 1 tablet by mouth daily     • Nutritional Supplements (PROSTATE PO) Take 1 Capful by mouth daily. Indications: Enlarged prostate     • omeprazole (PriLOSEC) 10 mg delayed release capsule Take 20 mg by mouth daily     • tamsulosin (FLOMAX) 0.4 mg Take 1 capsule (0.4 mg total) by mouth daily with dinner 90 capsule 3   • [DISCONTINUED] aspirin 81 mg chewable tablet Chew 81 mg every 12 (twelve) hours (Patient taking differently: Chew 81 mg once a week)     • [DISCONTINUED] docusate sodium (COLACE) 100 mg capsule Take 1 capsule (100 mg total) by mouth 2 (two) times a day for 15 days 30 capsule 0   • [DISCONTINUED] finasteride (PROSCAR) 5 mg tablet Take 1 tablet (5 mg total) by mouth daily 90 tablet 3   • [DISCONTINUED] naproxen (NAPROSYN) 500 mg tablet Take 1 tablet (500 mg total) by mouth 2 (two) times a day with meals for 5 days For pain 10 tablet 0   • [DISCONTINUED] nystatin (MYCOSTATIN) cream Apply topically 2 (two) times a day (Patient not taking: Reported on 10/11/2024) 30 g 1     No current facility-administered medications on file prior to visit.     Allergies   Allergen Reactions   • Chlorpheniramine Other (See Comments)   • Phenylephrine Other (See Comments)     Social History     Socioeconomic History   • Marital status: /Civil Union     Spouse name: None   • Number of children: None   • Years of education: None   • Highest education level: None   Occupational History   • None   Tobacco Use   • Smoking status: Never   • Smokeless tobacco: Never   Vaping Use   • Vaping status: Never Used   Substance and Sexual Activity   • Alcohol use: Not Currently     Comment: social   • Drug use: Never   • Sexual activity: Not Currently     Partners: Female   Other Topics Concern   • None   Social  "History Narrative   • None     Social Drivers of Health     Financial Resource Strain: Not on file   Food Insecurity: No Food Insecurity (7/29/2024)    Nursing - Inadequate Food Risk Classification    • Worried About Running Out of Food in the Last Year: Never true    • Ran Out of Food in the Last Year: Never true    • Ran Out of Food in the Last Year: Not on file   Transportation Needs: No Transportation Needs (9/26/2024)    OASIS : Transportation    • Lack of Transportation (Medical): No    • Lack of Transportation (Non-Medical): No    • Patient Unable or Declines to Respond: No   Physical Activity: Not on file   Stress: Not on file   Social Connections: Not on file   Intimate Partner Violence: Not on file   Housing Stability: Low Risk  (7/29/2024)    Housing Stability Vital Sign    • Unable to Pay for Housing in the Last Year: No    • Number of Times Moved in the Last Year: 0    • Homeless in the Last Year: No        Review of Systems   All other systems reviewed and are negative.      Vitals:  Vitals:    04/11/25 1254   BP: 110/76   Pulse: 73   Weight: 86.3 kg (190 lb 3.2 oz)   Height: 5' 9\" (1.753 m)     BMI - Body mass index is 28.09 kg/m².  Wt Readings from Last 7 Encounters:   04/11/25 86.3 kg (190 lb 3.2 oz)   11/27/24 85.3 kg (188 lb)   10/16/24 85.3 kg (188 lb)   10/11/24 83.9 kg (185 lb)   09/20/24 82.6 kg (182 lb)   08/16/24 84.4 kg (186 lb)   08/02/24 83.5 kg (184 lb)       Physical Exam  Vitals and nursing note reviewed.   Constitutional:       General: He is not in acute distress.     Appearance: Normal appearance. He is well-developed. He is not ill-appearing or diaphoretic.   HENT:      Head: Normocephalic and atraumatic.      Nose: No congestion.   Eyes:      General: No scleral icterus.     Conjunctiva/sclera: Conjunctivae normal.   Neck:      Vascular: No carotid bruit or JVD.   Cardiovascular:      Rate and Rhythm: Normal rate and regular rhythm.      Heart sounds: Normal heart sounds. No " "murmur heard.     No friction rub. No gallop.   Pulmonary:      Effort: Pulmonary effort is normal. No respiratory distress.      Breath sounds: Normal breath sounds. No wheezing or rales.   Chest:      Chest wall: No tenderness.   Abdominal:      General: There is no distension.      Palpations: Abdomen is soft.      Tenderness: There is no abdominal tenderness.   Musculoskeletal:         General: No swelling, tenderness or deformity.      Cervical back: Neck supple. No muscular tenderness.      Right lower leg: No edema.      Left lower leg: No edema.   Skin:     General: Skin is warm.   Neurological:      General: No focal deficit present.      Mental Status: He is alert and oriented to person, place, and time. Mental status is at baseline.   Psychiatric:         Mood and Affect: Mood normal.         Behavior: Behavior normal.         Thought Content: Thought content normal.         Labs:  CBC:   Lab Results   Component Value Date    WBC 9.95 07/29/2024    RBC 3.97 07/29/2024    HGB 12.1 07/29/2024    HCT 38.0 07/29/2024    MCV 96 07/29/2024     07/29/2024    RDW 13.4 07/29/2024       CMP:   Lab Results   Component Value Date    K 3.5 07/29/2024     07/29/2024    CO2 24 07/29/2024    BUN 29 (H) 07/29/2024    CREATININE 2.39 (H) 07/29/2024    EGFR 24 07/29/2024    CALCIUM 8.7 07/29/2024    AST 17 07/27/2024    ALT 9 07/27/2024    ALKPHOS 55 07/27/2024       Magnesium:  No results found for: \"MG\"    Lipid Profile:   Lab Results   Component Value Date    HDL 38 (L) 04/05/2024    TRIG 193 (H) 04/05/2024    LDLCALC 109 (H) 04/05/2024       Thyroid Studies:   Lab Results   Component Value Date    IFA9VISNWBWJ 2.019 04/05/2024    FREET4 0.61 04/05/2024    J4VHDPC 8.79 04/05/2024       No components found for: \"HGA1C\"    Lab Results   Component Value Date    INR 1.05 07/28/2024    INR 0.98 02/21/2024    INR 1.01 08/27/2021   5    Imaging: No results found.    Cardiac testing:   No results found for this or " any previous visit.  No results found for this or any previous visit.  No results found for this or any previous visit.  No results found for this or any previous visit.

## 2025-05-22 ENCOUNTER — APPOINTMENT (OUTPATIENT)
Dept: LAB | Facility: CLINIC | Age: 80
End: 2025-05-22
Payer: MEDICARE

## 2025-05-22 DIAGNOSIS — E78.2 MIXED HYPERLIPIDEMIA: ICD-10-CM

## 2025-05-22 DIAGNOSIS — I10 ESSENTIAL HYPERTENSION: ICD-10-CM

## 2025-05-22 DIAGNOSIS — I10 HYPERTENSION, ESSENTIAL: ICD-10-CM

## 2025-05-22 LAB
ALBUMIN SERPL BCG-MCNC: 4.6 G/DL (ref 3.5–5)
ALP SERPL-CCNC: 64 U/L (ref 34–104)
ALT SERPL W P-5'-P-CCNC: 11 U/L (ref 7–52)
ANION GAP SERPL CALCULATED.3IONS-SCNC: 12 MMOL/L (ref 4–13)
AST SERPL W P-5'-P-CCNC: 17 U/L (ref 13–39)
BASOPHILS # BLD AUTO: 0.05 THOUSANDS/ÂΜL (ref 0–0.1)
BASOPHILS NFR BLD AUTO: 1 % (ref 0–1)
BILIRUB SERPL-MCNC: 0.55 MG/DL (ref 0.2–1)
BUN SERPL-MCNC: 25 MG/DL (ref 5–25)
CALCIUM SERPL-MCNC: 9.5 MG/DL (ref 8.4–10.2)
CHLORIDE SERPL-SCNC: 104 MMOL/L (ref 96–108)
CHOLEST SERPL-MCNC: 219 MG/DL (ref ?–200)
CO2 SERPL-SCNC: 25 MMOL/L (ref 21–32)
CREAT SERPL-MCNC: 2.35 MG/DL (ref 0.6–1.3)
EOSINOPHIL # BLD AUTO: 0.45 THOUSAND/ÂΜL (ref 0–0.61)
EOSINOPHIL NFR BLD AUTO: 8 % (ref 0–6)
ERYTHROCYTE [DISTWIDTH] IN BLOOD BY AUTOMATED COUNT: 13.3 % (ref 11.6–15.1)
GFR SERPL CREATININE-BSD FRML MDRD: 24 ML/MIN/1.73SQ M
GLUCOSE P FAST SERPL-MCNC: 107 MG/DL (ref 65–99)
HCT VFR BLD AUTO: 40.9 % (ref 36.5–49.3)
HDLC SERPL-MCNC: 36 MG/DL
HGB BLD-MCNC: 13.1 G/DL (ref 12–17)
IMM GRANULOCYTES # BLD AUTO: 0.02 THOUSAND/UL (ref 0–0.2)
IMM GRANULOCYTES NFR BLD AUTO: 0 % (ref 0–2)
LDLC SERPL CALC-MCNC: 141 MG/DL (ref 0–100)
LYMPHOCYTES # BLD AUTO: 1.67 THOUSANDS/ÂΜL (ref 0.6–4.47)
LYMPHOCYTES NFR BLD AUTO: 28 % (ref 14–44)
MCH RBC QN AUTO: 30.6 PG (ref 26.8–34.3)
MCHC RBC AUTO-ENTMCNC: 32 G/DL (ref 31.4–37.4)
MCV RBC AUTO: 96 FL (ref 82–98)
MONOCYTES # BLD AUTO: 0.49 THOUSAND/ÂΜL (ref 0.17–1.22)
MONOCYTES NFR BLD AUTO: 8 % (ref 4–12)
NEUTROPHILS # BLD AUTO: 3.31 THOUSANDS/ÂΜL (ref 1.85–7.62)
NEUTS SEG NFR BLD AUTO: 55 % (ref 43–75)
NONHDLC SERPL-MCNC: 183 MG/DL
NRBC BLD AUTO-RTO: 0 /100 WBCS
PLATELET # BLD AUTO: 188 THOUSANDS/UL (ref 149–390)
PMV BLD AUTO: 9.9 FL (ref 8.9–12.7)
POTASSIUM SERPL-SCNC: 4.3 MMOL/L (ref 3.5–5.3)
PROT SERPL-MCNC: 8.2 G/DL (ref 6.4–8.4)
RBC # BLD AUTO: 4.28 MILLION/UL (ref 3.88–5.62)
SODIUM SERPL-SCNC: 141 MMOL/L (ref 135–147)
TRIGL SERPL-MCNC: 212 MG/DL (ref ?–150)
TSH SERPL DL<=0.05 MIU/L-ACNC: 1.81 UIU/ML (ref 0.45–4.5)
WBC # BLD AUTO: 5.99 THOUSAND/UL (ref 4.31–10.16)

## 2025-05-22 PROCEDURE — 80061 LIPID PANEL: CPT

## 2025-05-22 PROCEDURE — 36415 COLL VENOUS BLD VENIPUNCTURE: CPT

## 2025-05-22 PROCEDURE — 84443 ASSAY THYROID STIM HORMONE: CPT

## 2025-05-22 PROCEDURE — 80053 COMPREHEN METABOLIC PANEL: CPT

## 2025-05-22 PROCEDURE — 85025 COMPLETE CBC W/AUTO DIFF WBC: CPT

## 2025-05-23 ENCOUNTER — RESULTS FOLLOW-UP (OUTPATIENT)
Dept: CARDIOLOGY CLINIC | Facility: CLINIC | Age: 80
End: 2025-05-23

## 2025-06-16 ENCOUNTER — APPOINTMENT (OUTPATIENT)
Dept: LAB | Facility: CLINIC | Age: 80
End: 2025-06-16
Payer: MEDICARE

## 2025-06-16 ENCOUNTER — NURSE TRIAGE (OUTPATIENT)
Age: 80
End: 2025-06-16

## 2025-06-16 DIAGNOSIS — R30.0 DYSURIA: ICD-10-CM

## 2025-06-16 DIAGNOSIS — R30.0 DYSURIA: Primary | ICD-10-CM

## 2025-06-16 LAB
BACTERIA UR QL AUTO: ABNORMAL /HPF
BILIRUB UR QL STRIP: NEGATIVE
CLARITY UR: ABNORMAL
COLOR UR: YELLOW
GLUCOSE UR STRIP-MCNC: ABNORMAL MG/DL
HGB UR QL STRIP.AUTO: ABNORMAL
KETONES UR STRIP-MCNC: NEGATIVE MG/DL
LEUKOCYTE ESTERASE UR QL STRIP: ABNORMAL
NITRITE UR QL STRIP: NEGATIVE
NON-SQ EPI CELLS URNS QL MICRO: ABNORMAL /HPF
PH UR STRIP.AUTO: 6 [PH]
PROT UR STRIP-MCNC: ABNORMAL MG/DL
RBC #/AREA URNS AUTO: ABNORMAL /HPF
SP GR UR STRIP.AUTO: 1.02 (ref 1–1.03)
UROBILINOGEN UR STRIP-ACNC: <2 MG/DL
WBC #/AREA URNS AUTO: ABNORMAL /HPF
WBC CLUMPS # UR AUTO: PRESENT /UL

## 2025-06-16 PROCEDURE — 87086 URINE CULTURE/COLONY COUNT: CPT

## 2025-06-16 PROCEDURE — 81001 URINALYSIS AUTO W/SCOPE: CPT

## 2025-06-16 RX ORDER — BENZONATATE 200 MG/1
1 CAPSULE ORAL EVERY 8 HOURS
COMMUNITY
Start: 2025-01-02

## 2025-06-16 RX ORDER — LEVOFLOXACIN 500 MG/1
TABLET, FILM COATED ORAL EVERY 24 HOURS
COMMUNITY
Start: 2025-01-02

## 2025-06-16 NOTE — TELEPHONE ENCOUNTER
Patient called in. Patient has Hx of BPH and Meatal Stenosis. Patient states starting over the weekend he notice stream spraying and only going to one side with some hesitancy to start stream. Ordered urine testing d/t patient does have Hx of UTI, had CYSTOSCOPY DILATION OF STRICTURE/MEATAL STENOSIS AND CATHETER PACEMENT in September 2024. Scheduled for OV as well.     Answer Assessment - Initial Assessment Questions  1. When did your symptoms start?   Last few days  2. Do you experience pain or burning with every void?   no  3. Do you have any other urinary symptoms such as urinary frequency, urgency, incontinence, blood in your urine?   no  4. Do you have any abdominal pain or flank pain?  no  5. Do you have a fever of 101 or higher? How did you take your temperature?   no  6. Does your urine stream spray? (Specifically for males)   yes  7. Have you become unable to urinate?   no  8. Do you have a history of urinary tract infections?   yes  9. Have you had a recent urologic procedure, surgery, or any recent urine testing?   CYSTOSCOPY DILATION OF STRICTURE/MEATAL STENOSIS AND CATHETER PACEMENT September 2024  10. Have you ever been tested for an STD?   no    Protocols used: Urology-Dysuria-ADULT-[place]

## 2025-06-17 LAB — BACTERIA UR CULT: NORMAL

## 2025-06-18 ENCOUNTER — OFFICE VISIT (OUTPATIENT)
Dept: UROLOGY | Facility: CLINIC | Age: 80
End: 2025-06-18
Payer: MEDICARE

## 2025-06-18 VITALS
BODY MASS INDEX: 27.7 KG/M2 | DIASTOLIC BLOOD PRESSURE: 78 MMHG | HEIGHT: 69 IN | OXYGEN SATURATION: 95 % | HEART RATE: 62 BPM | SYSTOLIC BLOOD PRESSURE: 128 MMHG | WEIGHT: 187 LBS

## 2025-06-18 DIAGNOSIS — R33.9 URINARY RETENTION: Primary | ICD-10-CM

## 2025-06-18 LAB
POST-VOID RESIDUAL VOLUME, ML POC: 21 ML
SL AMB  POCT GLUCOSE, UA: 50
SL AMB LEUKOCYTE ESTERASE,UA: 500
SL AMB POCT BILIRUBIN,UA: NORMAL
SL AMB POCT BLOOD,UA: 50
SL AMB POCT CLARITY,UA: CLEAR
SL AMB POCT COLOR,UA: YELLOW
SL AMB POCT KETONES,UA: NORMAL
SL AMB POCT NITRITE,UA: NORMAL
SL AMB POCT PH,UA: 6
SL AMB POCT SPECIFIC GRAVITY,UA: 1.01
SL AMB POCT URINE PROTEIN: 30
SL AMB POCT UROBILINOGEN: NORMAL

## 2025-06-18 PROCEDURE — 81003 URINALYSIS AUTO W/O SCOPE: CPT | Performed by: PHYSICIAN ASSISTANT

## 2025-06-18 PROCEDURE — 51798 US URINE CAPACITY MEASURE: CPT | Performed by: PHYSICIAN ASSISTANT

## 2025-06-18 PROCEDURE — 99213 OFFICE O/P EST LOW 20 MIN: CPT | Performed by: PHYSICIAN ASSISTANT

## 2025-06-18 RX ORDER — CEFDINIR 300 MG/1
300 CAPSULE ORAL EVERY 12 HOURS SCHEDULED
Qty: 6 CAPSULE | Refills: 0 | Status: SHIPPED | OUTPATIENT
Start: 2025-06-18 | End: 2025-06-21

## 2025-06-18 NOTE — ASSESSMENT & PLAN NOTE
Follow up in 6 months for annual exam  Orders:    POCT Measure PVR    POCT urine dip auto non-scope    cefdinir (OMNICEF) 300 mg capsule; Take 1 capsule (300 mg total) by mouth every 12 (twelve) hours for 3 days

## 2025-06-18 NOTE — PROGRESS NOTES
Name: Mathew Sloan Jr.      : 1945      MRN: [id number]  Encounter Provider: Patricio Soto PA-C  Encounter Date: 2025   Encounter department: [facility]  :  Assessment & Plan  Urinary retention  Follow up in 6 months for annual exam  Orders:    POCT Measure PVR    POCT urine dip auto non-scope    cefdinir (OMNICEF) 300 mg capsule; Take 1 capsule (300 mg total) by mouth every 12 (twelve) hours for 3 days        History of Present Illness   Mathew Sloan Jr. is a 80 y.o. male who presents history of BPH and renal failure.  He had a suprapubic catheter recently last year.  He had passed a voiding trial and the SP catheter was discontinued in November.  He called recently because he had spraying her urine.Bladder is empty. Recent urine culture no growth.  AUA SYMPTOM SCORE      Flowsheet Row Most Recent Value   AUA SYMPTOM SCORE    How often have you had a sensation of not emptying your bladder completely after you finished urinating? 0 (P)     How often have you had to urinate again less than two hours after you finished urinating? 0 (P)     How often have you found you stopped and started again several times when you urinate? 1 (P)     How often have you found it difficult to postpone urination? 1 (P)     How often have you had a weak urinary stream? 1 (P)     How often have you had to push or strain to begin urination? 0 (P)     How many times did you most typically get up to urinate from the time you went to bed at night until the time you got up in the morning? 1 (P)     Quality of Life: If you were to spend the rest of your life with your urinary condition just the way it is now, how would you feel about that? 2 (P)     AUA SYMPTOM SCORE 4 (P)            Review of Systems       Objective   There were no vitals taken for this visit.    Physical Exam GEN: no acute distress    RESP: breathing comfortably with no accessory muscle use    ABD: soft, non-tender,  "non-distended   INCISION:    EXT: no significant peripheral edema   (Male): Penis is prepped with Betadine.  A lubricated 14 Belgian male sound easily wire was.There was no evidence of meatal stenosis or stricture.    RADIOLOGY:   none        Results   No results found for: \"PSA\"  Lab Results   Component Value Date    CALCIUM 9.5 05/22/2025    K 4.3 05/22/2025    CO2 25 05/22/2025     05/22/2025    BUN 25 05/22/2025    CREATININE 2.35 (H) 05/22/2025     Lab Results   Component Value Date    WBC 5.99 05/22/2025    HGB 13.1 05/22/2025    HCT 40.9 05/22/2025    MCV 96 05/22/2025     05/22/2025       Office Urine Dip  No results found for this or any previous visit (from the past hour).      "

## (undated) DEVICE — INVIEW CLEAR LEGGINGS: Brand: CONVERTORS

## (undated) DEVICE — SPECIMEN CONTAINER STERILE PEEL PACK

## (undated) DEVICE — GLOVE INDICATOR PI UNDERGLOVE SZ 7 BLUE

## (undated) DEVICE — GLOVE SRG BIOGEL 7

## (undated) DEVICE — CHLORHEXIDINE 4PCT 4 OZ

## (undated) DEVICE — PACK TUR

## (undated) DEVICE — GAUZE SPONGES,16 PLY: Brand: CURITY